# Patient Record
Sex: FEMALE | Race: WHITE | Employment: FULL TIME | URBAN - METROPOLITAN AREA
[De-identification: names, ages, dates, MRNs, and addresses within clinical notes are randomized per-mention and may not be internally consistent; named-entity substitution may affect disease eponyms.]

---

## 2017-01-31 ENCOUNTER — TELEPHONE (OUTPATIENT)
Dept: CARDIOLOGY CLINIC | Age: 72
End: 2017-01-31

## 2017-02-22 ENCOUNTER — PROCEDURE VISIT (OUTPATIENT)
Dept: CARDIOLOGY CLINIC | Age: 72
End: 2017-02-22

## 2017-02-22 ENCOUNTER — OFFICE VISIT (OUTPATIENT)
Dept: CARDIOLOGY CLINIC | Age: 72
End: 2017-02-22

## 2017-02-22 VITALS
SYSTOLIC BLOOD PRESSURE: 122 MMHG | RESPIRATION RATE: 16 BRPM | OXYGEN SATURATION: 96 % | BODY MASS INDEX: 30.73 KG/M2 | WEIGHT: 167 LBS | HEIGHT: 62 IN | DIASTOLIC BLOOD PRESSURE: 80 MMHG | HEART RATE: 76 BPM

## 2017-02-22 VITALS
DIASTOLIC BLOOD PRESSURE: 84 MMHG | BODY MASS INDEX: 30.88 KG/M2 | WEIGHT: 167.8 LBS | HEIGHT: 62 IN | SYSTOLIC BLOOD PRESSURE: 122 MMHG | HEART RATE: 97 BPM | OXYGEN SATURATION: 97 %

## 2017-02-22 DIAGNOSIS — R55 SYNCOPE, UNSPECIFIED SYNCOPE TYPE: Primary | ICD-10-CM

## 2017-02-22 DIAGNOSIS — I10 ESSENTIAL HYPERTENSION, BENIGN: ICD-10-CM

## 2017-02-22 DIAGNOSIS — Z45.09 ENCOUNTER FOR ELECTRONIC ANALYSIS OF REVEAL EVENT RECORDER: ICD-10-CM

## 2017-02-22 DIAGNOSIS — I25.10 CORONARY ARTERY DISEASE INVOLVING NATIVE CORONARY ARTERY OF NATIVE HEART WITHOUT ANGINA PECTORIS: Primary | ICD-10-CM

## 2017-02-22 DIAGNOSIS — R00.2 PALPITATIONS: ICD-10-CM

## 2017-02-22 DIAGNOSIS — I25.10 CORONARY ARTERY DISEASE INVOLVING NATIVE CORONARY ARTERY OF NATIVE HEART WITHOUT ANGINA PECTORIS: ICD-10-CM

## 2017-02-22 DIAGNOSIS — R00.0 TACHYCARDIA: ICD-10-CM

## 2017-02-22 PROCEDURE — 1123F ACP DISCUSS/DSCN MKR DOCD: CPT | Performed by: INTERNAL MEDICINE

## 2017-02-22 PROCEDURE — G8427 DOCREV CUR MEDS BY ELIG CLIN: HCPCS | Performed by: INTERNAL MEDICINE

## 2017-02-22 PROCEDURE — G8400 PT W/DXA NO RESULTS DOC: HCPCS | Performed by: INTERNAL MEDICINE

## 2017-02-22 PROCEDURE — 3014F SCREEN MAMMO DOC REV: CPT | Performed by: INTERNAL MEDICINE

## 2017-02-22 PROCEDURE — G8484 FLU IMMUNIZE NO ADMIN: HCPCS | Performed by: INTERNAL MEDICINE

## 2017-02-22 PROCEDURE — 99214 OFFICE O/P EST MOD 30 MIN: CPT | Performed by: INTERNAL MEDICINE

## 2017-02-22 PROCEDURE — 1090F PRES/ABSN URINE INCON ASSESS: CPT | Performed by: INTERNAL MEDICINE

## 2017-02-22 PROCEDURE — 1036F TOBACCO NON-USER: CPT | Performed by: INTERNAL MEDICINE

## 2017-02-22 PROCEDURE — G8598 ASA/ANTIPLAT THER USED: HCPCS | Performed by: INTERNAL MEDICINE

## 2017-02-22 PROCEDURE — 4040F PNEUMOC VAC/ADMIN/RCVD: CPT | Performed by: INTERNAL MEDICINE

## 2017-02-22 PROCEDURE — 99243 OFF/OP CNSLTJ NEW/EST LOW 30: CPT | Performed by: INTERNAL MEDICINE

## 2017-02-22 PROCEDURE — G8417 CALC BMI ABV UP PARAM F/U: HCPCS | Performed by: INTERNAL MEDICINE

## 2017-02-22 PROCEDURE — 3017F COLORECTAL CA SCREEN DOC REV: CPT | Performed by: INTERNAL MEDICINE

## 2017-02-22 PROCEDURE — 93291 INTERROG DEV EVAL SCRMS IP: CPT | Performed by: INTERNAL MEDICINE

## 2017-02-22 RX ORDER — PRASUGREL 10 MG/1
10 TABLET, FILM COATED ORAL DAILY
Qty: 90 TABLET | Refills: 3 | Status: ON HOLD | OUTPATIENT
Start: 2017-02-22 | End: 2018-05-25

## 2017-02-24 ENCOUNTER — PROCEDURE VISIT (OUTPATIENT)
Dept: CARDIOTHORACIC SURGERY | Age: 72
End: 2017-02-24

## 2017-02-24 DIAGNOSIS — I65.23 BILATERAL CAROTID ARTERY STENOSIS: Primary | ICD-10-CM

## 2017-02-24 PROCEDURE — 93880 EXTRACRANIAL BILAT STUDY: CPT | Performed by: SURGERY

## 2017-03-28 RX ORDER — BETAXOLOL 10 MG/1
10 TABLET, FILM COATED ORAL DAILY
Qty: 60 TABLET | Refills: 5 | Status: SHIPPED | OUTPATIENT
Start: 2017-03-28 | End: 2017-08-08 | Stop reason: SDUPTHER

## 2017-03-28 RX ORDER — FENOFIBRATE 160 MG/1
160 TABLET ORAL DAILY
Qty: 30 TABLET | Refills: 11 | Status: SHIPPED | OUTPATIENT
Start: 2017-03-28 | End: 2018-04-25 | Stop reason: SDUPTHER

## 2017-04-27 RX ORDER — CLOPIDOGREL BISULFATE 75 MG/1
75 TABLET ORAL DAILY
Qty: 30 TABLET | Refills: 5 | Status: ON HOLD | OUTPATIENT
Start: 2017-04-27 | End: 2018-05-25

## 2017-04-27 RX ORDER — CLOPIDOGREL BISULFATE 75 MG/1
75 TABLET ORAL DAILY
COMMUNITY
End: 2017-04-27 | Stop reason: SDUPTHER

## 2017-06-09 RX ORDER — ROSUVASTATIN CALCIUM 40 MG/1
40 TABLET, COATED ORAL EVERY EVENING
Qty: 30 TABLET | Refills: 11 | Status: SHIPPED | OUTPATIENT
Start: 2017-06-09 | End: 2018-07-01 | Stop reason: SDUPTHER

## 2017-06-20 ENCOUNTER — TELEPHONE (OUTPATIENT)
Dept: CARDIOLOGY CLINIC | Age: 72
End: 2017-06-20

## 2017-07-07 ENCOUNTER — TELEPHONE (OUTPATIENT)
Dept: CARDIOLOGY CLINIC | Age: 72
End: 2017-07-07

## 2017-08-08 ENCOUNTER — HOSPITAL ENCOUNTER (OUTPATIENT)
Dept: OTHER | Age: 72
Discharge: HOME | End: 2017-08-08
Attending: INTERNAL MEDICINE | Admitting: INTERNAL MEDICINE

## 2017-08-08 ENCOUNTER — OFFICE VISIT (OUTPATIENT)
Dept: CARDIOLOGY CLINIC | Age: 72
End: 2017-08-08

## 2017-08-08 VITALS
WEIGHT: 168 LBS | SYSTOLIC BLOOD PRESSURE: 120 MMHG | DIASTOLIC BLOOD PRESSURE: 80 MMHG | OXYGEN SATURATION: 93 % | HEART RATE: 84 BPM | BODY MASS INDEX: 30.91 KG/M2 | HEIGHT: 62 IN

## 2017-08-08 DIAGNOSIS — I25.10 CORONARY ARTERY DISEASE INVOLVING NATIVE CORONARY ARTERY OF NATIVE HEART WITHOUT ANGINA PECTORIS: Primary | ICD-10-CM

## 2017-08-08 DIAGNOSIS — E78.5 HYPERLIPIDEMIA, UNSPECIFIED HYPERLIPIDEMIA TYPE: Chronic | ICD-10-CM

## 2017-08-08 DIAGNOSIS — R06.02 SOB (SHORTNESS OF BREATH): ICD-10-CM

## 2017-08-08 DIAGNOSIS — R00.0 INAPPROPRIATE SINUS TACHYCARDIA: ICD-10-CM

## 2017-08-08 DIAGNOSIS — I10 ESSENTIAL HYPERTENSION, BENIGN: ICD-10-CM

## 2017-08-08 DIAGNOSIS — Z95.5 STATUS POST INSERTION OF DRUG-ELUTING STENT INTO RIGHT CORONARY ARTERY FOR CORONARY ARTERY DISEASE: ICD-10-CM

## 2017-08-08 LAB
HCT VFR BLD CALC: 37.5 % (ref 36–48)
HEMOGLOBIN: 12.3 G/DL (ref 12–16)
MCH RBC QN AUTO: 31.9 PG (ref 26–34)
MCHC RBC AUTO-ENTMCNC: 32.8 G/DL (ref 31–36)
MCV RBC AUTO: 97.1 FL (ref 80–100)
PDW BLD-RTO: 12.4 % (ref 12.4–15.4)
PLATELET # BLD: 299 K/UL (ref 135–450)
PMV BLD AUTO: 9.8 FL (ref 5–10.5)
RBC # BLD: 3.86 M/UL (ref 4–5.2)
WBC # BLD: 9.4 K/UL (ref 4–11)

## 2017-08-08 PROCEDURE — 4040F PNEUMOC VAC/ADMIN/RCVD: CPT | Performed by: INTERNAL MEDICINE

## 2017-08-08 PROCEDURE — 1090F PRES/ABSN URINE INCON ASSESS: CPT | Performed by: INTERNAL MEDICINE

## 2017-08-08 PROCEDURE — G8400 PT W/DXA NO RESULTS DOC: HCPCS | Performed by: INTERNAL MEDICINE

## 2017-08-08 PROCEDURE — 3014F SCREEN MAMMO DOC REV: CPT | Performed by: INTERNAL MEDICINE

## 2017-08-08 PROCEDURE — G8417 CALC BMI ABV UP PARAM F/U: HCPCS | Performed by: INTERNAL MEDICINE

## 2017-08-08 PROCEDURE — 1036F TOBACCO NON-USER: CPT | Performed by: INTERNAL MEDICINE

## 2017-08-08 PROCEDURE — 1123F ACP DISCUSS/DSCN MKR DOCD: CPT | Performed by: INTERNAL MEDICINE

## 2017-08-08 PROCEDURE — 3017F COLORECTAL CA SCREEN DOC REV: CPT | Performed by: INTERNAL MEDICINE

## 2017-08-08 PROCEDURE — G8598 ASA/ANTIPLAT THER USED: HCPCS | Performed by: INTERNAL MEDICINE

## 2017-08-08 PROCEDURE — 99214 OFFICE O/P EST MOD 30 MIN: CPT | Performed by: INTERNAL MEDICINE

## 2017-08-08 PROCEDURE — G8427 DOCREV CUR MEDS BY ELIG CLIN: HCPCS | Performed by: INTERNAL MEDICINE

## 2017-08-08 RX ORDER — BETAXOLOL 10 MG/1
10 TABLET, FILM COATED ORAL 2 TIMES DAILY
Qty: 180 TABLET | Refills: 3 | Status: SHIPPED | OUTPATIENT
Start: 2017-08-08 | End: 2018-09-19

## 2017-08-09 ENCOUNTER — TELEPHONE (OUTPATIENT)
Dept: CARDIOLOGY CLINIC | Age: 72
End: 2017-08-09

## 2017-08-25 ENCOUNTER — PROCEDURE VISIT (OUTPATIENT)
Dept: VASCULAR SURGERY | Age: 72
End: 2017-08-25

## 2017-08-25 DIAGNOSIS — I65.23 BILATERAL CAROTID ARTERY STENOSIS: Primary | ICD-10-CM

## 2017-08-25 PROCEDURE — 93880 EXTRACRANIAL BILAT STUDY: CPT | Performed by: SURGERY

## 2018-02-13 NOTE — PROGRESS NOTES
thickness.   Global ejection fraction is mildly decreased and estimated from 45 % to   50 %. There is akinesis of the basal inferior wall.   Diastolic filling parameters suggests diastolic dysfunction .   Trivial mitral regurgitation is present.     CATH: 8/27/15  [facility] SUMMARY  ~LM normal  ~LAD 40% mid  ~Cx 20% OM1  ~% prox instent, 50% mid, 50% PDA,   (anomalous anterior and vertical takeoff, AL1 engaged adequately, JR4 did not engage ostium)  ~LVG 50%, inferior hk     Impression  ~Angiography w/ acute RCA occlusion  ~LVEDP 20  ~LVG with borderline EF with inferior RWMA       Cardiac Cath 12/3/13:  ANGIOGRAPHIC FINDINGS:   1. Mild-to-moderate nonobstructive coronary artery disease with patent right   coronary artery stents from prior procedures. 2. Normal left ventricular function   3. Normal hemodynamics. ANGIOGRAPHIC FINDINGS:   1. Left main is normal and bifurcates into the LAD and the circumflex. The   circumflex coronary artery is a small codominant vessel and has no critical   disease. LAD is a moderate-sized vessel and has a moderate-sized large   diagonal branch and a smaller LAD. The diagonal branch has a mild disease   within it. The LAD is free of disease. The right coronary artery is a large   dominant vessel circulation and has an anterior takeoff with previously   placed stents within in the ostium and in the proximal third. There is   mild-to-moderate in-stent restenosis of these stented segments and distal   right coronary artery is a small and codominant and no critical disease. Left ventriculogram was performed and shows ejection fraction of 60%. Left   ventricular end diastolic pressure is 9, aortic pressure is 132/64, left   ventricular pressure is 134/9. There is no gradient on pullback. Wall   motion is normal.     Assessment:   1.  Coronary artery disease involving native coronary artery of native heart with other form of angina pectoris ([address]  ECHO Complete 2D W Doppler W Color

## 2018-02-14 ENCOUNTER — OFFICE VISIT (OUTPATIENT)
Dept: CARDIOLOGY CLINIC | Age: 73
End: 2018-02-14

## 2018-02-14 VITALS
DIASTOLIC BLOOD PRESSURE: 76 MMHG | BODY MASS INDEX: 30.69 KG/M2 | SYSTOLIC BLOOD PRESSURE: 122 MMHG | HEIGHT: 62 IN | HEART RATE: 100 BPM | WEIGHT: 166.8 LBS

## 2018-02-14 DIAGNOSIS — Z95.5 STATUS POST INSERTION OF DRUG-ELUTING STENT INTO RIGHT CORONARY ARTERY FOR CORONARY ARTERY DISEASE: ICD-10-CM

## 2018-02-14 DIAGNOSIS — I65.21 STENOSIS OF RIGHT CAROTID ARTERY: ICD-10-CM

## 2018-02-14 DIAGNOSIS — I25.118 CORONARY ARTERY DISEASE INVOLVING NATIVE CORONARY ARTERY OF NATIVE HEART WITH OTHER FORM OF ANGINA PECTORIS (HCC): Primary | ICD-10-CM

## 2018-02-14 DIAGNOSIS — E78.5 HYPERLIPIDEMIA, UNSPECIFIED HYPERLIPIDEMIA TYPE: ICD-10-CM

## 2018-02-14 DIAGNOSIS — I35.1 NONRHEUMATIC AORTIC VALVE INSUFFICIENCY: ICD-10-CM

## 2018-02-14 DIAGNOSIS — R60.0 BILATERAL LEG EDEMA: ICD-10-CM

## 2018-02-14 DIAGNOSIS — I10 ESSENTIAL HYPERTENSION: ICD-10-CM

## 2018-02-14 DIAGNOSIS — Z45.09 ENCOUNTER FOR ELECTRONIC ANALYSIS OF REVEAL EVENT RECORDER: ICD-10-CM

## 2018-02-14 PROCEDURE — 1123F ACP DISCUSS/DSCN MKR DOCD: CPT | Performed by: INTERNAL MEDICINE

## 2018-02-14 PROCEDURE — G8427 DOCREV CUR MEDS BY ELIG CLIN: HCPCS | Performed by: INTERNAL MEDICINE

## 2018-02-14 PROCEDURE — 1036F TOBACCO NON-USER: CPT | Performed by: INTERNAL MEDICINE

## 2018-02-14 PROCEDURE — G8598 ASA/ANTIPLAT THER USED: HCPCS | Performed by: INTERNAL MEDICINE

## 2018-02-14 PROCEDURE — G8484 FLU IMMUNIZE NO ADMIN: HCPCS | Performed by: INTERNAL MEDICINE

## 2018-02-14 PROCEDURE — G8417 CALC BMI ABV UP PARAM F/U: HCPCS | Performed by: INTERNAL MEDICINE

## 2018-02-14 PROCEDURE — 1090F PRES/ABSN URINE INCON ASSESS: CPT | Performed by: INTERNAL MEDICINE

## 2018-02-14 PROCEDURE — 4040F PNEUMOC VAC/ADMIN/RCVD: CPT | Performed by: INTERNAL MEDICINE

## 2018-02-14 PROCEDURE — 3014F SCREEN MAMMO DOC REV: CPT | Performed by: INTERNAL MEDICINE

## 2018-02-14 PROCEDURE — 3017F COLORECTAL CA SCREEN DOC REV: CPT | Performed by: INTERNAL MEDICINE

## 2018-02-14 PROCEDURE — G8400 PT W/DXA NO RESULTS DOC: HCPCS | Performed by: INTERNAL MEDICINE

## 2018-02-14 PROCEDURE — 99214 OFFICE O/P EST MOD 30 MIN: CPT | Performed by: INTERNAL MEDICINE

## 2018-02-14 RX ORDER — PANTOPRAZOLE SODIUM 40 MG/1
40 TABLET, DELAYED RELEASE ORAL 2 TIMES DAILY
Qty: 60 TABLET | Refills: 0 | Status: SHIPPED | OUTPATIENT
Start: 2018-02-14 | End: 2019-09-25 | Stop reason: SDUPTHER

## 2018-02-14 RX ORDER — LISINOPRIL 10 MG/1
10 TABLET ORAL DAILY
Qty: 90 TABLET | Refills: 3 | Status: SHIPPED | OUTPATIENT
Start: 2018-02-14 | End: 2019-09-25 | Stop reason: ALTCHOICE

## 2018-02-14 NOTE — LETTER
[address] [place] [address] Cardiology - [place] [address] [address]  Phone: [phone]  Fax: [phone]    Trent Alejo MD        February 15, 2018     SU[facility]ANNE Fern[facility]dez [facility] #[id number]  [address] [address]    Patient: Danyel Giles  MR Number: [id number]  YOB: 1945  Date of Visit: 2/14/2018    Dear Dr. Vanessa Elias:    Below are the relevant portions of my assessment and plan of care. [address]   Cardiac Followup    Referring Provider:  Vanessa Elias     Chief Complaint   Patient presents with    6 Month Follow-Up    Hypertension    Coronary Artery Disease     chest pain    Swelling     in ankles when sitting or driving a long time        History of Present Illness: This 71 y.o. female who is here for a follow up visit. She a history of CAD s/p PCI, carotid disease and hyperlipidemia. She had a  inferior STEMI 8/27/15 s/p emergent cath w/ stent to RCA, post procedure course complicated by pericarditis and significant gi bleeding. Her stress test in 5/2016 showed normal perfusion. S/p left CEA in 7/2014. She had multiple episodes of syncope and underwent ILR on 10/15/2014. Today she reports intermittent chest discomfort, mid-sternal with radiation to right chest, associated with diaphoresis, random occurrence and unrelated to exertion, is relieved with \"time. \"  She breathes through her mouth more than she should and has DELGADO with minimal exertion. She has lower extremity edema noted when legs in dependent position for longer periods of time such as car rides. Resting HR is in 80's which she thinks is too high and can increase to low 100's. She denies heart racing and would like ILR removed as the battery is depleted. She has noted BP to be high on occasion (150/103). She tries to avoid salt and has lost 12 lbs, but wants to lose 24 additional pounds.   She was hospitalized at [facility] in Per[place]ard[place]: no significant effusion seen  Conclusions:  indication is dizziness Normal LV size, hyperdynamic LV function with EF greater  than 75 percent, abnormal relaxation Mild-to-moderate aortic regurgitation, mild  tricuspid regurgitation  Electronically signed by MD Carrie Anaya on 07/09/2017 at 04:17 PM (No Signature  Object)    Carotid US 2/2017:  Right:    50-80% stenosis of the internal carotid artery based on velocity criteria    and Bmode image.    <50% stenosis of the common carotid artery.    >>50% stenosis of the external carotid artery.    Unremarkable subclavian and vertebral arteries.        Left:    <50% stenosis of the internal carotid artery based on velocity criteria and    Bmode image.    <50% stenosis of the common carotid artery.    <50% stenosis of the external carotid artery.    Unremarkable subclavian and vertebral arteries. Carotid US 8/2016   Right:    50-80% stenosis of the internal carotid artery based on velocity criteria    and Bmode image.    <50% stenosis of the common carotid artery.    >50% stenosis of the external carotid artery.    Unremarkable subclavian and vertebral arteries.        Left:    1-15% stenosis of the internal carotid artery based on velocity criteria and    Bmode image.    <50% stenosis of the common carotid artery.    <50% stenosis of the external carotid artery.    Unremarkable subclavian and vertebral arteries        Recommendations    In comparison to the previous exam 01/19/2016 there are no significant    changes.    Follow-up to be done on or after 02/19/2017.           Stress Myoview 5/2016  Summary   No EKG evidence for ischemia with exercise   Preserved LV systolic function   Myocardial perfusion imaging with no evidence for ischemia with exercise   Stress Protocols    Resting ECG    NSR     Echo: 1/19/16  The left ventricle is normal in size. Normal left ventricular systolic function, with estimated ejection fraction of 55%-60%. No regional wall motion abnormalities noted. Mild concentric left ventricular hypertrophy is present. Diastolic filling parameters suggests grade I diastolic dysfunction. Aortic valve is not well visualized but appears mildly sclerotic and opens adequately. Mild mitral and aortic regurgitation are present. Trivial tricuspid regurgitation. Systolic pulmonary artery pressure (SPAP) is normal and estimated at 27mmHg (RA pressure 3 mm Hg). ECHO: 8/27/15   Left ventricle size is normal.   Normal left ventricular wall thickness.   Global ejection fraction is mildly decreased and estimated from 45 % to   50 %. There is akinesis of the basal inferior wall.   Diastolic filling parameters suggests diastolic dysfunction .   Trivial mitral regurgitation is present.     CATH: 8/27/15  [facility] SUMMARY  ~LM normal  ~LAD 40% mid  ~Cx 20% OM1  ~% prox instent, 50% mid, 50% PDA,   (anomalous anterior and vertical takeoff, AL1 engaged adequately, JR4 did not engage ostium)  ~LVG 50%, inferior hk     Impression  ~Angiography w/ acute RCA occlusion  ~LVEDP 20  ~LVG with borderline EF with inferior RWMA       Cardiac Cath 12/3/13:  ANGIOGRAPHIC FINDINGS:   1. Mild-to-moderate nonobstructive coronary artery disease with patent right   coronary artery stents from prior procedures. 2. Normal left ventricular function   3. Normal hemodynamics. ANGIOGRAPHIC FINDINGS:   1. Left main is normal and bifurcates into the LAD and the circumflex. The   circumflex coronary artery is a small codominant vessel and has no critical   disease. LAD is a moderate-sized vessel and has a moderate-sized large   diagonal branch and a smaller LAD. The diagonal branch has a mild disease   within it. The LAD is free of disease. The right coronary artery is a large   dominant vessel circulation and has an anterior takeoff with previously   placed stents within in the ostium and in the proximal third.  There is 12.8 oz (75.7 kg), not currently breastfeeding. stable at today's visit, but has been elevated intermittently at home  Will add Lisinopril 10 mg daily    Hyperlipidemia:  10/2017 TC- 185, TG- 349, HDL- 42, LDL- 73. (Care Everywhere)  Treated with Crestor 40 mg and Fenofibrate 160 mg daily    Palpitations:  Has ILR implanted for monitoring of palpitations. No current heart racing  Patient states ILR battery is depleted and would like to have device removed. Was implanted by Dr. Domo Mcdowell in [facility]  Will refer to EP in F    Also has resting HR of 80's which she feels is too high. On maximum dose of Betaxolol    Edema:  Has occasional edema (dependent) which is likely related to venous insufficiency  Discussed wearing compression stocking during planned tavo car rides or other activities when legs will be in dependent position. Aortic insufficiency  7/2017 Echo showed mild to moderate AI  Will repeat echo soon    Plan:  1. Refer to EP to discuss removal of ILR  2. Myoview GXT (can convert to [employer] if needed) to evaluate atypical chest pain and DELGADO  3. Repeat echo to reassess AI  4. Start Lisinopril 10 mg daily to improve BP control  5. Check CMP, lipids, and CBC in 2-3 weeks. 6.  Compression hose 15-20 mmHg knee high on long trips  7. RTC one month    Thank you for allowing me to participate in the care of this individual.    Scribe's attestation: This note was scribed in the presence of Jacqueline Schaefer M.D. by Aneesh Moseley RN    Physician Attestation: The scribe's documentation has been prepared under my direction and personally reviewed by me in its entirety. I confirm that the note above accurately reflects all work, treatment, procedures, and medical decision making performed by me. Domnick Sensing Billey Duverney, M.D., [facility] [place],        If you have questions, please do not hesitate to call me. I look forward to following Gustav Litten along with you.     Sincerely,        Jacqueline Schaefer MD

## 2018-02-15 NOTE — COMMUNICATION BODY
(3/2009); Appendectomy; Hand surgery; Intracapsular cataract extraction (Bilateral, [id number]); fracture surgery (01/2013); eye surgery (12/19/2012. 12/30/2012); other surgical history (1/17/14); Carotid endarterectomy (Left, 7-8-14); ablation of dysrhythmic focus (7/6/2015); Insertable Cardiac Monitor (10/15/2014); skin biopsy; Coronary angioplasty with stent (1/24/2012); Coronary angioplasty with stent (8/27/2015); Colonoscopy (2007); and Upper gastrointestinal endoscopy (06/20/2016). Social History:   reports that she quit smoking about 54 years ago. She has a 0.50 pack-year smoking history. She has never used smokeless tobacco. She reports that she drinks alcohol. She reports that she does not use drugs. Family History:  family history is not on file. She was adopted. Home Medications:  Prior to Admission medications    Medication Sig Start Date End Date Taking? Authorizing Provider   lisinopril (PRINIVIL;ZESTRIL) 10 MG tablet Take 1 tablet by mouth daily 2/14/18  Yes Diane Rooney MD   pantoprazole (PROTONIX) 40 MG tablet Take 1 tablet by mouth 2 times daily prescribing med until patient can get into GI doctor 2/14/18  Yes Diane Rooney MD   rosuvastatin (CRESTOR) 40 MG tablet Take 1 tablet by mouth every evening 6/9/17  Yes Diane Rooney MD   fenofibrate 160 MG tablet Take 1 tablet by mouth daily 3/28/17  Yes Diane Rooney MD   nitroGLYCERIN (NITROSTAT) 0.4 MG SL tablet DISSOLVE 1 TAB UNDER TONGUE FOR CHEST PAIN - IF PAIN REMAINS AFTER 5 MIN, CALL 911 AND REPEAT DOSE.  MAX 3 TABS IN 15 MINUTES 9/22/16  Yes Gayle Sousa NP   HYDROcodone-acetaminophen (NORCO) 5-325 MG per tablet Take 1 tablet by mouth every 6 hours as needed for Pain   Yes Historical Provider, MD   acyclovir (ZOVIRAX) 200 MG capsule Take 200 mg by mouth daily   Yes Historical Provider, MD   venlafaxine (EFFEXOR) 75 MG tablet Take 75 mg by mouth daily   Yes Historical Provider, MD   Calcium Carb-Cholecalciferol (CALCIUM Noted  Neurological/Psychiatric:  · Alert and oriented in all spheres  · Moves all extremities well  · Exhibits normal gait balance and coordination  · No abnormalities of mood, affect, memory, mentation, or behavior are noted      Carotid US 8/2017  Right:    50-80% stenosis of the internal carotid artery based on velocity criteria    and B-Mode image.    <50% stenosis of the common carotid artery.    >50% stenosis of the external carotid artery.    Unremarkable subclavian and vertebral arteries.        Left:    <50% stenosis of the internal carotid artery based on velocity criteria and    B-Mode image.    <50% stenosis of the common carotid artery.    <50% stenosis of the external carotid artery.    Unremarkable subclavian and vertebral arteries.         Echo 7/7/17: ([facility])    Left Atrium: Moderately enlarged     Left Ventricle: Normal LV size and  hyperdynamic LV function, EF is greater than 75 percent, mild LVH, abnormal  relaxation     Right Atrium: normal size     Right Ventricle: normal size and  function     Aortic Valve: appears normal, normal mobility, mild to moderate  regurgitation     Aorta: aortic root is normal in size     IVC/PA: IVC size is  normal     Mitral Valve: moderate annular calcification, mildly thickened  leaflets, trivial regurgitation     Tricuspid Valve: appears normal, mild  regurgitation, RVSP is 32 mmHg     Pulmonic Valve: appears normal, trivial  regurgitation     Pericardium: no significant effusion seen  Conclusions:  indication is dizziness Normal LV size, hyperdynamic LV function with EF greater  than 75 percent, abnormal relaxation Mild-to-moderate aortic regurgitation, mild  tricuspid regurgitation  Electronically signed by MD Yvon Sweet on 07/09/2017 at 04:17 PM (No Signature  Object)    Carotid US 2/2017:  Right:    50-80% stenosis of the internal carotid artery based on velocity criteria    and Bmode image.    <50% stenosis of the common carotid artery.    >>50% stenosis of

## 2018-02-22 ENCOUNTER — HOSPITAL ENCOUNTER (OUTPATIENT)
Dept: NON INVASIVE DIAGNOSTICS | Age: 73
Discharge: HOME | End: 2018-02-22
Attending: INTERNAL MEDICINE | Admitting: INTERNAL MEDICINE

## 2018-02-22 DIAGNOSIS — I25.118 CORONARY ARTERY DISEASE INVOLVING NATIVE CORONARY ARTERY OF NATIVE HEART WITH OTHER FORM OF ANGINA PECTORIS (HCC): ICD-10-CM

## 2018-02-22 DIAGNOSIS — I25.118 ATHEROSCLEROTIC HEART DISEASE OF NATIVE CORONARY ARTERY WITH OTHER FORMS OF ANGINA PECTORIS (HCC): ICD-10-CM

## 2018-02-22 DIAGNOSIS — I35.1 NONRHEUMATIC AORTIC VALVE INSUFFICIENCY: ICD-10-CM

## 2018-02-22 DIAGNOSIS — I10 ESSENTIAL HYPERTENSION: ICD-10-CM

## 2018-02-22 LAB
LEFT VENTRICULAR EJECTION FRACTION HIGH VALUE: 65 %
LEFT VENTRICULAR EJECTION FRACTION MODE: NORMAL
LV EF: 65 %
LV EF: 73 %
LVEF MODALITY: NORMAL
LVEF MODALITY: NORMAL

## 2018-02-22 NOTE — PROGRESS NOTES
Patient instructed on Shabbir Protocol Stress Test Procedure including possible side effects and adverse reactions. Verbalizes knowledge and understanding and denies having any questions.  Drake Anderson RN

## 2018-02-22 NOTE — PROGRESS NOTES
Pt unable to walk ariella stress test protocol due to SOB and fatigue. Pt switched to Ashley Crutch stress test.       Patient instructed on Lexiscan Protocol Stress Test Procedure including possible side effects and adverse reactions. Verbalizes knowledge and understanding and denies having any questions.  Ken Siemens, RN

## 2018-02-23 ENCOUNTER — TELEPHONE (OUTPATIENT)
Dept: CARDIOLOGY CLINIC | Age: 73
End: 2018-02-23

## 2018-02-23 NOTE — TELEPHONE ENCOUNTER
Asher Ramirez MD  [facility] Staff    Echo looks good.  Strength of the heart muscle is normal. Camilla Rout are without significant abnormality.      Called patient and relayed message, per MARYANNE

## 2018-03-13 NOTE — PROGRESS NOTES
past surgical history that includes Hysterectomy; Cholecystectomy; Coronary angioplasty with stent (3/2009); Appendectomy; Hand surgery; Intracapsular cataract extraction (Bilateral, [id number]); fracture surgery (01/2013); eye surgery (12/19/2012. 12/30/2012); other surgical history (1/17/14); Carotid endarterectomy (Left, 7-8-14); ablation of dysrhythmic focus (7/6/2015); Insertable Cardiac Monitor (10/15/2014); skin biopsy; Coronary angioplasty with stent (1/24/2012); Coronary angioplasty with stent (8/27/2015); Colonoscopy (2007); and Upper gastrointestinal endoscopy (06/20/2016). Social History:   reports that she quit smoking about 54 years ago. She has a 0.50 pack-year smoking history. She has never used smokeless tobacco. She reports that she drinks alcohol. She reports that she does not use drugs. Family History:  family history is not on file. She was adopted. Home Medications:  Prior to Admission medications    Medication Sig Start Date End Date Taking? Authorizing Provider   lisinopril (PRINIVIL;ZESTRIL) 10 MG tablet Take 1 tablet by mouth daily 2/14/18  Yes Trent Alejo MD   pantoprazole (PROTONIX) 40 MG tablet Take 1 tablet by mouth 2 times daily prescribing med until patient can get into GI doctor 2/14/18  Yes Trent Alejo MD   betaxolol Gregoria Door) 10 MG tablet Take 1 tablet by mouth 2 times daily 8/8/17  Yes Trent Alejo MD   rosuvastatin (CRESTOR) 40 MG tablet Take 1 tablet by mouth every evening 6/9/17  Yes Trent Alejo MD   clopidogrel (PLAVIX) 75 MG tablet Take 1 tablet by mouth daily 4/27/17  Yes Trent Alejo MD   fenofibrate 160 MG tablet Take 1 tablet by mouth daily 3/28/17  Yes Trent Alejo MD   prasugrel (EFFIENT) 10 MG TABS Take 1 tablet by mouth daily 2/22/17  Yes Trent Alejo MD   nitroGLYCERIN (NITROSTAT) 0.4 MG SL tablet DISSOLVE 1 TAB UNDER TONGUE FOR CHEST PAIN - IF PAIN REMAINS AFTER 5 MIN, CALL 911 AND REPEAT DOSE.  MAX 3 TABS IN 15 MINUTES 9/22/16  Yes Edita Shepard NP   HYDROcodone-acetaminophen (NORCO) 5-325 MG per tablet Take 1 tablet by mouth every 6 hours as needed for Pain   Yes Historical Provider, MD   acyclovir (ZOVIRAX) 200 MG capsule Take 200 mg by mouth daily   Yes Historical Provider, MD   venlafaxine (EFFEXOR) 75 MG tablet Take 75 mg by mouth daily   Yes Historical Provider, MD   polycarbophil (FIBER-LAX) 625 MG tablet Take 625 mg by mouth daily   Yes Historical Provider, MD   Calcium Carb-Cholecalciferol (CALCIUM + D3) 600-200 MG-UNIT TABS Take 1 tablet by mouth daily   Yes Historical Provider, MD   Biotin 1000 MCG TABS Take by mouth Daily   Yes Historical Provider, MD   vitamin B-12 (CYANOCOBALAMIN) 100 MCG tablet Take 50 mcg by mouth daily. Yes Historical Provider, MD   docusate sodium (COLACE) 100 MG capsule   Take 200 mg by mouth nightly    Yes Historical Provider, MD   aspirin 81 MG EC tablet Take 81 mg by mouth daily. Yes Historical Provider, MD   Multiple Vitamins-Minerals (THERAPEUTIC MULTIVITAMIN-MINERALS) tablet Take 1 tablet by mouth daily. Yes Historical Provider, MD        Allergies:  Codeine; Nsaids; Prochlorperazine; Prochlorperazine edisylate; Sulfa antibiotics; and Coreg [carvedilol]     Review of Systems:   A 10 point review of systems is negative except as noted in the history of present illness. Physical Examination:    Vitals:    03/14/18 1236   BP: 120/79   Pulse: 87   SpO2: 91%   Stable. Constitutional and General Appearance: NAD   Respiratory:  · Normal excursion and expansion without use of accessory muscles  · Resp Auscultation: Normal breath sounds without dullness  Cardiovascular:  · The apical impulses not displaced  · JVD is normal  · The carotid upstroke is normal in amplitude and contour without delay or bruit  · Normal S1S2, No S3, No Murmur. Regular rate and rhythm  · Peripheral pulses are symmetrical and full  · There is no clubbing, cyanosis of the extremities.   · No edema  · Pedal Pulses: 2+ and equal   Abdomen:  · No masses or tenderness  · Liver/Spleen: No Abnormalities Noted  Neurological/Psychiatric:  · Alert and oriented in all spheres  · Moves all extremities well  · Exhibits normal gait balance and coordination  · No abnormalities of mood, affect, memory, mentation, or behavior are noted      Echo 2/22/18:  Normal left ventricle size, wall thickness, and systolic function with an   EF of 65%. Aortic valve appears sclerotic but opens adequately. Mild aortic regurgitation. Mild mitral annular calcification is present. Trace mitral regurgitation noted. Trivial tricuspid regurgitation with a RVSP estimation of 29 mmHg. Lexiscan myoview GXT 2/22/18:  No EKG evidence for ischemia with lexiscan   Normal LV size and systolic function. There is normal isotope uptake at stress and rest. There is no evidence of  myocardial ischemia or scar.       Carotid US 8/2017:  Right:    50-80% stenosis of the internal carotid artery based on velocity criteria    and B-Mode image.    <50% stenosis of the common carotid artery.    >50% stenosis of the external carotid artery.    Unremarkable subclavian and vertebral arteries.        Left:    <50% stenosis of the internal carotid artery based on velocity criteria and    B-Mode image.    <50% stenosis of the common carotid artery.    <50% stenosis of the external carotid artery.    Unremarkable subclavian and vertebral arteries.         [facility]/17: [facility] [facility] [address]    Left Atrium: Moderately enlarged     Left Ventricle: Normal LV size and  hyperdynamic LV function, EF is greater than 75 percent, mild LVH, abnormal  relaxation     Right Atrium: normal size     Right Ventricle: normal size and  function     Aortic Valve: appears normal, normal mobility, mild to moderate  regurgitation     Aorta: aortic root is normal in size     IVC/PA: IVC size is  normal     Mitral Valve: moderate annular calcification, mildly thickened  leaflets, trivial 1/19/16  The left ventricle is normal in size. Normal left ventricular systolic function, with estimated ejection fraction of 55%-60%. No regional wall motion abnormalities noted. Mild concentric left ventricular hypertrophy is present. Diastolic filling parameters suggests grade I diastolic dysfunction. Aortic valve is not well visualized but appears mildly sclerotic and opens adequately. Mild mitral and aortic regurgitation are present. Trivial tricuspid regurgitation. Systolic pulmonary artery pressure (SPAP) is normal and estimated at 27mmHg (RA pressure 3 mm Hg). ECHO: 8/27/15   Left ventricle size is normal.   Normal left ventricular wall thickness.   Global ejection fraction is mildly decreased and estimated from 45 % to   50 %. There is akinesis of the basal inferior wall.   Diastolic filling parameters suggests diastolic dysfunction .   Trivial mitral regurgitation is present.     CATH: 8/27/15  C SUMMARY  ~LM normal  ~LAD 40% mid  ~Cx 20% OM1  ~% prox instent, 50% mid, 50% PDA,   (anomalous anterior and vertical takeoff, AL1 engaged adequately, JR4 did not engage ostium)  ~LVG 50%, inferior hk     Impression  ~Angiography w/ acute RCA occlusion  ~LVEDP 20  ~LVG with borderline EF with inferior RWMA       Cardiac Cath 12/3/13:  ANGIOGRAPHIC FINDINGS:   1. Mild-to-moderate nonobstructive coronary artery disease with patent right   coronary artery stents from prior procedures. 2. Normal left ventricular function   3. Normal hemodynamics. ANGIOGRAPHIC FINDINGS:   1. Left main is normal and bifurcates into the LAD and the circumflex. The   circumflex coronary artery is a small codominant vessel and has no critical   disease. LAD is a moderate-sized vessel and has a moderate-sized large   diagonal branch and a smaller LAD. The diagonal branch has a mild disease   within it. The LAD is free of disease.  The right coronary artery is a large   dominant vessel circulation and has an anterior Hyperlipidemia:  - 10/2017 TC- 185, TG- 349, HDL- 42, LDL- 73. (Care Everywhere)  - Treated with Crestor 40 mg and Fenofibrate 160 mg daily  - 3/3/18- TC- 163, TG- 172, HDL- 49, LDL 80. AST 41, ALT 41.    - stable    6. Palpitations:  - Has ILR implanted for monitoring of palpitations.   - No current heart racing  - Patient states ILR battery is depleted and would like to have device removed. Was implanted by Dr. Richard Obando in [place]  - has appt with EP 4/4/18 to discuss removal of ILR  - palpitations are not frequent although her resting HR is 80-90's. HR remains under 100 most of the time. She is on maximum dose of Betaxolol    7. Edema:  - Has occasional edema (dependent) which is likely related to venous insufficiency  - Discussed wearing compression stocking during planned tavo car rides or other activities when legs will be in dependent position. - recommended 15-20 mmHg compression stockings    8. Aortic insufficiency  - 7/2017 Echo showed mild to moderate AI  - 2/22/18 Echo showed mild AI and normal EF. Plan:  1. No change in medication  2. Recommend 15-20 mmHg knee high compression hose. 3.  Follow up in six months     Thank you for allowing me to participate in the care of this individual.    Scribe's attestation: This note was scribed in the presence of Evy Palmer M.D. by Fawad Lerner RN    Physician Attestation: The scribe's documentation has been prepared under my direction and personally reviewed by me in its entirety. I confirm that the note above accurately reflects all work, treatment, procedures, and medical decision making performed by me. Glenn Franco M.D., [facility] [place],

## 2018-03-14 ENCOUNTER — OFFICE VISIT (OUTPATIENT)
Dept: CARDIOLOGY CLINIC | Age: 73
End: 2018-03-14

## 2018-03-14 VITALS
HEART RATE: 87 BPM | OXYGEN SATURATION: 91 % | DIASTOLIC BLOOD PRESSURE: 79 MMHG | HEIGHT: 63 IN | SYSTOLIC BLOOD PRESSURE: 120 MMHG | WEIGHT: 169 LBS | BODY MASS INDEX: 29.95 KG/M2

## 2018-03-14 DIAGNOSIS — I65.29 CAROTID ATHEROSCLEROSIS, UNSPECIFIED LATERALITY: ICD-10-CM

## 2018-03-14 DIAGNOSIS — E78.2 MIXED HYPERLIPIDEMIA: ICD-10-CM

## 2018-03-14 DIAGNOSIS — I10 ESSENTIAL HYPERTENSION, BENIGN: ICD-10-CM

## 2018-03-14 DIAGNOSIS — I35.1 NONRHEUMATIC AORTIC VALVE INSUFFICIENCY: ICD-10-CM

## 2018-03-14 DIAGNOSIS — I25.118 CORONARY ARTERY DISEASE INVOLVING NATIVE CORONARY ARTERY OF NATIVE HEART WITH OTHER FORM OF ANGINA PECTORIS (HCC): Primary | ICD-10-CM

## 2018-03-14 DIAGNOSIS — Z98.61 CAD S/P PERCUTANEOUS CORONARY ANGIOPLASTY: ICD-10-CM

## 2018-03-14 DIAGNOSIS — Z95.5 STATUS POST INSERTION OF DRUG-ELUTING STENT INTO RIGHT CORONARY ARTERY FOR CORONARY ARTERY DISEASE: ICD-10-CM

## 2018-03-14 DIAGNOSIS — R60.0 BILATERAL LEG EDEMA: ICD-10-CM

## 2018-03-14 DIAGNOSIS — I25.10 CAD S/P PERCUTANEOUS CORONARY ANGIOPLASTY: ICD-10-CM

## 2018-03-14 PROCEDURE — 3017F COLORECTAL CA SCREEN DOC REV: CPT | Performed by: INTERNAL MEDICINE

## 2018-03-14 PROCEDURE — G8400 PT W/DXA NO RESULTS DOC: HCPCS | Performed by: INTERNAL MEDICINE

## 2018-03-14 PROCEDURE — G8598 ASA/ANTIPLAT THER USED: HCPCS | Performed by: INTERNAL MEDICINE

## 2018-03-14 PROCEDURE — 1123F ACP DISCUSS/DSCN MKR DOCD: CPT | Performed by: INTERNAL MEDICINE

## 2018-03-14 PROCEDURE — 3014F SCREEN MAMMO DOC REV: CPT | Performed by: INTERNAL MEDICINE

## 2018-03-14 PROCEDURE — 99214 OFFICE O/P EST MOD 30 MIN: CPT | Performed by: INTERNAL MEDICINE

## 2018-03-14 PROCEDURE — G8427 DOCREV CUR MEDS BY ELIG CLIN: HCPCS | Performed by: INTERNAL MEDICINE

## 2018-03-14 PROCEDURE — G8417 CALC BMI ABV UP PARAM F/U: HCPCS | Performed by: INTERNAL MEDICINE

## 2018-03-14 PROCEDURE — G8484 FLU IMMUNIZE NO ADMIN: HCPCS | Performed by: INTERNAL MEDICINE

## 2018-03-14 PROCEDURE — 1090F PRES/ABSN URINE INCON ASSESS: CPT | Performed by: INTERNAL MEDICINE

## 2018-03-14 PROCEDURE — 4040F PNEUMOC VAC/ADMIN/RCVD: CPT | Performed by: INTERNAL MEDICINE

## 2018-03-14 PROCEDURE — 1036F TOBACCO NON-USER: CPT | Performed by: INTERNAL MEDICINE

## 2018-03-14 NOTE — LETTER
[address] Cardiology - [place] [address] [address] [address]  Phone: [phone]  Fax: [phone]    Beth Martinez MD        March 16, 2018     Lolly Garza NELLY  [id number]  [address] [place] [address]    Patient: Brendon Carter  MR Number: [id number]  YOB: 1945  Date of Visit: 3/14/2018    Dear Dr. Tadeo Borjas:    Below are the relevant portions of my assessment and plan of care. [facility]   Cardiac Followup    Referring Provider:  Tadeo Borjas     Chief Complaint   Patient presents with    Hyperlipidemia     no cardiac complaints     Hypertension    Coronary Artery Disease    Palpitations    Shortness of Breath    Tachycardia        History of Present Illness: This 71 y.o. female who is here for a follow up visit. She a history of CAD s/p PCI, carotid disease and hyperlipidemia. She had a  inferior STEMI 8/27/15 s/p emergent cath w/ stent to RCA, post procedure course complicated by pericarditis and significant gi bleeding. Her stress test in 5/2016 showed normal perfusion. S/p left CEA in 7/2014. She had multiple episodes of syncope and underwent ILR on 10/15/2014. She was hospitalized at [facility] 7/2017 with rectal bleeding (? Etiology). Echo was done showing mild to moderate AI. At office visit 2/14/18, she reported mid-sternal chest discomfort unrelated to exertion and DELGADO. Lexiscan myoview showed normal perfusion. Echo showed normal EF and mild AI. Lisinopril 10 mg daily was added to improve elevated BP readings from home. She also reported LE edema when legs in dependent position for longer periods of time. Compression hose was recommended for long car rides. She also lost 12 lbs recently with dietary changes. Today, she states she does not have swelling often and only occurs when she riding in the car for long hours.   She is not wearing compression hose as they were very expensive. She had a sore throat recently due to enlarged saliva gland. She denies exertional chest pain or shortness of breath. She has arthritis and follows with rheumatologist at [place] [address]. Past Medical History:   has a past medical history of Arthritis; Blood transfusion; CAD (coronary artery disease); Coronary stent; Febrile seizure ([facility] [address] Gastroesophageal reflux disease; History of blood transfusion; Hyperlipidemia; and Hypertension. Surgical History:   has a past surgical history that includes Hysterectomy; Cholecystectomy; Coronary angioplasty with stent (3/2009); Appendectomy; Hand surgery; Intracapsular cataract extraction (Bilateral, [id number]); fracture surgery (01/2013); eye surgery (12/19/2012. 12/30/2012); other surgical history (1/17/14); Carotid endarterectomy (Left, 7-8-14); ablation of dysrhythmic focus (7/6/2015); Insertable Cardiac Monitor (10/15/2014); skin biopsy; Coronary angioplasty with stent (1/24/2012); Coronary angioplasty with stent (8/27/2015); Colonoscopy (2007); and Upper gastrointestinal endoscopy (06/20/2016). Social History:   reports that she quit smoking about 54 years ago. She has a 0.50 pack-year smoking history. She has never used smokeless tobacco. She reports that she drinks alcohol. She reports that she does not use drugs. Family History:  family history is not on file. She was adopted. Home Medications:  Prior to Admission medications    Medication Sig Start Date End Date Taking?  Authorizing Provider   lisinopril (PRINIVIL;ZESTRIL) 10 MG tablet Take 1 tablet by mouth daily 2/14/18  Yes Mary Montejo MD   pantoprazole (PROTONIX) 40 MG tablet Take 1 tablet by mouth 2 times daily prescribing med until patient can get into GI doctor 2/14/18  Yes Mary Montejo MD   betaxolol Lisa Duster) 10 MG tablet Take 1 tablet by mouth 2 times daily 8/8/17  Yes Mary Montejo MD

## 2018-03-16 NOTE — COMMUNICATION BODY
9/22/16  Yes Fidel Orosco NP   HYDROcodone-acetaminophen (NORCO) 5-325 MG per tablet Take 1 tablet by mouth every 6 hours as needed for Pain   Yes Historical Provider, MD   acyclovir (ZOVIRAX) 200 MG capsule Take 200 mg by mouth daily   Yes Historical Provider, MD   venlafaxine (EFFEXOR) 75 MG tablet Take 75 mg by mouth daily   Yes Historical Provider, MD   polycarbophil (FIBER-LAX) 625 MG tablet Take 625 mg by mouth daily   Yes Historical Provider, MD   Calcium Carb-Cholecalciferol (CALCIUM + D3) 600-200 MG-UNIT TABS Take 1 tablet by mouth daily   Yes Historical Provider, MD   Biotin 1000 MCG TABS Take by mouth Daily   Yes Historical Provider, MD   vitamin B-12 (CYANOCOBALAMIN) 100 MCG tablet Take 50 mcg by mouth daily. Yes Historical Provider, MD   docusate sodium (COLACE) 100 MG capsule   Take 200 mg by mouth nightly    Yes Historical Provider, MD   aspirin 81 MG EC tablet Take 81 mg by mouth daily. Yes Historical Provider, MD   Multiple Vitamins-Minerals (THERAPEUTIC MULTIVITAMIN-MINERALS) tablet Take 1 tablet by mouth daily. Yes Historical Provider, MD        Allergies:  Codeine; Nsaids; Prochlorperazine; Prochlorperazine edisylate; Sulfa antibiotics; and Coreg [carvedilol]     Review of Systems:   A 10 point review of systems is negative except as noted in the history of present illness. Physical Examination:    Vitals:    03/14/18 1236   BP: 120/79   Pulse: 87   SpO2: 91%   Stable. Constitutional and General Appearance: NAD   Respiratory:  · Normal excursion and expansion without use of accessory muscles  · Resp Auscultation: Normal breath sounds without dullness  Cardiovascular:  · The apical impulses not displaced  · JVD is normal  · The carotid upstroke is normal in amplitude and contour without delay or bruit  · Normal S1S2, No S3, No Murmur. Regular rate and rhythm  · Peripheral pulses are symmetrical and full  · There is no clubbing, cyanosis of the extremities.   · No edema  · Pedal Pulses: 2+ and equal   Abdomen:  · No masses or tenderness  · Liver/Spleen: No Abnormalities Noted  Neurological/Psychiatric:  · Alert and oriented in all spheres  · Moves all extremities well  · Exhibits normal gait balance and coordination  · No abnormalities of mood, affect, memory, mentation, or behavior are noted      Echo 2/22/18:  Normal left ventricle size, wall thickness, and systolic function with an   EF of 65%. Aortic valve appears sclerotic but opens adequately. Mild aortic regurgitation. Mild mitral annular calcification is present. Trace mitral regurgitation noted. Trivial tricuspid regurgitation with a RVSP estimation of 29 mmHg. Lexiscan myoview GXT 2/22/18:  No EKG evidence for ischemia with lexiscan   Normal LV size and systolic function. There is normal isotope uptake at stress and rest. There is no evidence of  myocardial ischemia or scar.       Carotid US 8/2017:  Right:    50-80% stenosis of the internal carotid artery based on velocity criteria    and B-Mode image.    <50% stenosis of the common carotid artery.    >50% stenosis of the external carotid artery.    Unremarkable subclavian and vertebral arteries.        Left:    <50% stenosis of the internal carotid artery based on velocity criteria and    B-Mode image.    <50% stenosis of the common carotid artery.    <50% stenosis of the external carotid artery.    Unremarkable subclavian and vertebral arteries.         [facility]/17: [facility]    Left Atrium: Moderately enlarged     Left Ventricle: Normal LV size and  hyperdynamic LV function, EF is greater than 75 percent, mild LVH, abnormal  relaxation     Right Atrium: normal size     Right Ventricle: normal size and  function     Aortic Valve: appears normal, normal mobility, mild to moderate  regurgitation     Aorta: aortic root is normal in size     IVC/PA: IVC size is  normal     Mitral Valve: moderate annular calcification, mildly thickened  leaflets, trivial regurgitation     Tricuspid Valve: appears normal, mild  regurgitation, RVSP is 32 mmHg     Pulmonic Valve: appears normal, trivial  regurgitation     Pericardium: no significant effusion seen  Conclusions:  indication is dizziness Normal LV size, hyperdynamic LV function with EF greater  than 75 percent, abnormal relaxation Mild-to-moderate aortic regurgitation, mild  tricuspid regurgitation  Electronically signed by MD Bossman Bolivar on 07/09/2017 at 04:17 PM (No Signature  Object)    Carotid US 2/2017:  Right:    50-80% stenosis of the internal carotid artery based on velocity criteria    and Bmode image.    <50% stenosis of the common carotid artery.    >>50% stenosis of the external carotid artery.    Unremarkable subclavian and vertebral arteries.        Left:    <50% stenosis of the internal carotid artery based on velocity criteria and    Bmode image.    <50% stenosis of the common carotid artery.    <50% stenosis of the external carotid artery.    Unremarkable subclavian and vertebral arteries.      Carotid US 8/2016   Right:    50-80% stenosis of the internal carotid artery based on velocity criteria    and Bmode image.    <50% stenosis of the common carotid artery.    >50% stenosis of the external carotid artery.    Unremarkable subclavian and vertebral arteries.        Left:    1-15% stenosis of the internal carotid artery based on velocity criteria and    Bmode image.    <50% stenosis of the common carotid artery.    <50% stenosis of the external carotid artery.    Unremarkable subclavian and vertebral arteries        Recommendations    In comparison to the previous exam 01/19/2016 there are no significant    changes.    Follow-up to be done on or after 02/19/2017.           Stress Myoview 5/2016  Summary   No EKG evidence for ischemia with exercise   Preserved LV systolic function   Myocardial perfusion imaging with no evidence for ischemia with exercise   Stress Protocols    Resting ECG    NSR     Echo:

## 2018-04-04 ENCOUNTER — PROCEDURE VISIT (OUTPATIENT)
Dept: CARDIOLOGY CLINIC | Age: 73
End: 2018-04-04

## 2018-04-04 ENCOUNTER — OFFICE VISIT (OUTPATIENT)
Dept: CARDIOLOGY CLINIC | Age: 73
End: 2018-04-04

## 2018-04-04 VITALS
OXYGEN SATURATION: 98 % | WEIGHT: 170 LBS | HEIGHT: 62 IN | HEART RATE: 93 BPM | BODY MASS INDEX: 31.28 KG/M2 | DIASTOLIC BLOOD PRESSURE: 79 MMHG | SYSTOLIC BLOOD PRESSURE: 118 MMHG

## 2018-04-04 DIAGNOSIS — Z45.09 ENCOUNTER FOR ELECTRONIC ANALYSIS OF REVEAL EVENT RECORDER: ICD-10-CM

## 2018-04-04 DIAGNOSIS — I10 ESSENTIAL HYPERTENSION, BENIGN: ICD-10-CM

## 2018-04-04 DIAGNOSIS — I25.10 CORONARY ARTERY DISEASE INVOLVING NATIVE CORONARY ARTERY OF NATIVE HEART WITHOUT ANGINA PECTORIS: ICD-10-CM

## 2018-04-04 DIAGNOSIS — R00.2 PALPITATIONS: ICD-10-CM

## 2018-04-04 DIAGNOSIS — R07.89 OTHER CHEST PAIN: ICD-10-CM

## 2018-04-04 DIAGNOSIS — R55 SYNCOPE, UNSPECIFIED SYNCOPE TYPE: Primary | ICD-10-CM

## 2018-04-04 PROCEDURE — G8417 CALC BMI ABV UP PARAM F/U: HCPCS | Performed by: INTERNAL MEDICINE

## 2018-04-04 PROCEDURE — 1090F PRES/ABSN URINE INCON ASSESS: CPT | Performed by: INTERNAL MEDICINE

## 2018-04-04 PROCEDURE — 1036F TOBACCO NON-USER: CPT | Performed by: INTERNAL MEDICINE

## 2018-04-04 PROCEDURE — G8427 DOCREV CUR MEDS BY ELIG CLIN: HCPCS | Performed by: INTERNAL MEDICINE

## 2018-04-04 PROCEDURE — 4040F PNEUMOC VAC/ADMIN/RCVD: CPT | Performed by: INTERNAL MEDICINE

## 2018-04-04 PROCEDURE — 3017F COLORECTAL CA SCREEN DOC REV: CPT | Performed by: INTERNAL MEDICINE

## 2018-04-04 PROCEDURE — 3014F SCREEN MAMMO DOC REV: CPT | Performed by: INTERNAL MEDICINE

## 2018-04-04 PROCEDURE — G8400 PT W/DXA NO RESULTS DOC: HCPCS | Performed by: INTERNAL MEDICINE

## 2018-04-04 PROCEDURE — G8598 ASA/ANTIPLAT THER USED: HCPCS | Performed by: INTERNAL MEDICINE

## 2018-04-04 PROCEDURE — 1123F ACP DISCUSS/DSCN MKR DOCD: CPT | Performed by: INTERNAL MEDICINE

## 2018-04-04 PROCEDURE — 99214 OFFICE O/P EST MOD 30 MIN: CPT | Performed by: INTERNAL MEDICINE

## 2018-04-04 PROCEDURE — 93291 INTERROG DEV EVAL SCRMS IP: CPT | Performed by: INTERNAL MEDICINE

## 2018-04-25 RX ORDER — FENOFIBRATE 160 MG/1
160 TABLET ORAL DAILY
Qty: 90 TABLET | Refills: 3 | Status: SHIPPED | OUTPATIENT
Start: 2018-04-25 | End: 2019-09-25 | Stop reason: SDUPTHER

## 2018-06-04 ENCOUNTER — PROCEDURE VISIT (OUTPATIENT)
Dept: CARDIOLOGY CLINIC | Age: 73
End: 2018-06-04

## 2018-06-04 DIAGNOSIS — Z09 FOLLOW UP: Primary | ICD-10-CM

## 2018-07-02 RX ORDER — ROSUVASTATIN CALCIUM 40 MG/1
TABLET, COATED ORAL
Qty: 30 TABLET | Refills: 10 | Status: SHIPPED | OUTPATIENT
Start: 2018-07-02 | End: 2019-07-20 | Stop reason: SDUPTHER

## 2018-08-08 DIAGNOSIS — I65.23 ASYMPTOMATIC CAROTID ARTERY STENOSIS, BILATERAL: Primary | ICD-10-CM

## 2018-08-30 ENCOUNTER — HOSPITAL ENCOUNTER (OUTPATIENT)
Dept: VASCULAR LAB | Age: 73
Discharge: HOME | End: 2018-08-30
Payer: COMMERCIAL

## 2018-08-30 DIAGNOSIS — I65.23 ASYMPTOMATIC CAROTID ARTERY STENOSIS, BILATERAL: ICD-10-CM

## 2018-08-30 PROCEDURE — 93880 EXTRACRANIAL BILAT STUDY: CPT

## 2018-08-31 ENCOUNTER — TELEPHONE (OUTPATIENT)
Dept: VASCULAR SURGERY | Age: 73
End: 2018-08-31

## 2018-09-17 NOTE — PROGRESS NOTES
[address]    2018    Brooklynn Montejo (:  1945) is a 68 y.o. female who is here in follow up for management of coronary artery disease. Referring Provider: Akiko Beltre    HISTORY: Ms Bella Brewer has a history of coronary artery disease with IWMI and PCI to RCA 8/27/15. She developed pericarditis and significant gi bleeding post intervension. S/p left CEA in 2014. She had multiple episodes of syncope and underwent ILR on 10/15/2014 and was removed 18. She was hospitalized at [facility] 2017 with rectal bleeding (? Etiology). Echo was done showing mild to moderate AI. Today, her main complaint is of right hip and knee pain which was worse for her this morning. She follows with a rheumatologist and pain management physician. She denies exertional chest pain, but has shortness of breath with exertion. She denies heart racing. She has lost 24 lbs since the beginning of the year and wants to lose an additional 10 lbs if possible. REVIEW OF SYSTEMS:  A complete review of systems was reviewed and is negative except as noted in the history of present illness. Prior to Visit Medications    Medication Sig Taking? Authorizing Provider   rosuvastatin (CRESTOR) 40 MG tablet TAKE ONE TABLET BY MOUTH EVERY EVENING Yes Katt Saba MD   fenofibrate 160 MG tablet Take 1 tablet by mouth daily Yes Katt Saba MD   lisinopril (PRINIVIL;ZESTRIL) 10 MG tablet Take 1 tablet by mouth daily Yes Katt Saba MD   pantoprazole (PROTONIX) 40 MG tablet Take 1 tablet by mouth 2 times daily prescribing med until patient can get into GI doctor Yes Katt Saba MD   nitroGLYCERIN (NITROSTAT) 0.4 MG SL tablet DISSOLVE 1 TAB UNDER TONGUE FOR CHEST PAIN - IF PAIN REMAINS AFTER 5 MIN, CALL 911 AND REPEAT DOSE.  MAX 3 TABS IN 15 MINUTES Yes MARIELENA Obrien - LIS   HYDROcodone-acetaminophen (NORCO) 5-325 MG per tablet Take 1 tablet by mouth every 6 hours as LEFT CAROTID ENDARTERECTOMY               CHOLECYSTECTOMY      COLONOSCOPY  2007    CORONARY ANGIOPLASTY WITH STENT PLACEMENT  3/2009    @BN - DWAYNE X 2 to RCA    CORONARY ANGIOPLASTY WITH STENT PLACEMENT  1/24/2012    Dr Villa Ceballos to [address]  8/27/2015    [facility] Dr Soha RICE/[place] to [address]  12/19/2012. 12/30/2012    bilateral    FRACTURE SURGERY  01/2013    ORIF right wrist    HAND SURGERY      Left hand    HYSTERECTOMY      INSERTABLE CARDIAC MONITOR  10/15/2014    Dr Benji Buck, [facility] [address] EXTRACTION Bilateral [id number]    rt     OTHER SURGICAL HISTORY  1/17/14    excision right nasal dorsum epidermal cyst with frozen section lesion superior nasal dorsem    SKIN BIOPSY      UPPER GASTROINTESTINAL ENDOSCOPY  06/20/2016       Social History   Substance Use Topics    Smoking status: Former Smoker     Packs/day: 1.00     Years: 0.50     Quit date: 1/1/1964    Smokeless tobacco: Never Used      Comment: smoked 2-3 months when 19    Alcohol use 0.0 oz/week      Comment: occasionally        Family History   Problem Relation Age of Onset    Adopted: Yes       PHYSICAL EXAMINATION:  Vitals:    09/19/18 1349 09/19/18 1353 09/19/18 1445   BP: (!) 140/100 (!) 138/100 130/78   Site: Right Upper Arm Right Upper Arm    Position: Sitting Sitting    Cuff Size: Medium Adult Medium Adult    Pulse: 80     Weight: 160 lb (72.6 kg)     Height: 5' 2\" (1.575 m)       Estimated body mass index is 29.26 kg/m² as calculated from the following:    Height as of this encounter: 5' 2\" (1.575 m). Weight as of this encounter: 160 lb (72.6 kg).     General Appearance: No apparent distress  Eyes:  · Conjunctiva clear  · Pupils equal, round, reactive to light  ENT:  · External Ears and Nose unremarkable  · Oral mucosa is moist  Respiratory:  · Normal excursion and expansion without use of accessory muscles  · Resp Auscultation: Normal breath sounds without insertion of drug-eluting stent into right coronary artery for coronary artery disease  ~  [facility] 8/2015 showed prox RCA stent occluded with nonobstructive disease in mid RCA, PDA, LAD, and OM-1.  EF 50% > s/p DWAYNE to in-stent restenosis of RCA  ~ MPI  In 5/2016 and 2/2018 showed normal perfusion.    ~ Treated with ASA, statin. Off Plavix on 5/25/18 at the time of ILR removal (difficult removal as device migrated to ribs). ~ no exertional chest pain    Plan > continue current medications    4. Essential hypertension  ~ treated with Lisinopril  ~ Blood pressure 130/78, pulse 80, height 5' 2\" (1.575 m), weight 160 lb (72.6 kg), not currently breastfeeding. Plan > continue current medications    5. Dyslipidemia  ~ treated with Fenofibrate and Crestor 40 mg  ~ 10/2017 TC- 185, TG- 349 (was 233 in 7/2017), HDL- 42, LDL- 73. Plan >  Repeat BMP, lipids, liver enzymes, HgbA1C. 6. Nonrheumatic aortic valve insufficiency  ~ 7/2017 Echo ([facility]) > mild to moderate AI  ~ 2/2018 Echo > mild AI with normal EF    Plan > will monitor with serial echoes. Discuss timing of repeat echo at next visit. 7.  Carotid artery disease:  ~ hx of left CEA 7/2014  ~ 8/2018 carotid ultrasound LALY 95-87%, LICA < 33%. Plan > followed by Dr. Matias Geronimo:  1. No change in medications  2. Labs soon - lipids, liver enzymes, BMP, and HgbA1C  3. Continue weight loss efforts  4. Follow up in one year. Scribe's attestation: This note was scribed in the presence of Ally Ayala M.D. by Prachi Sunshine RN    Physician Attestation: The scribe's documentation has been prepared under my direction and personally reviewed by me in its entirety. I confirm that the note above accurately reflects all work, treatment, procedures, and medical decision making performed by me. An  electronic signature was used to authenticate this note. Mario Villarreal MD, [facility] [place], [address]

## 2018-09-19 ENCOUNTER — OFFICE VISIT (OUTPATIENT)
Dept: CARDIOLOGY CLINIC | Age: 73
End: 2018-09-19

## 2018-09-19 VITALS
BODY MASS INDEX: 29.44 KG/M2 | HEART RATE: 80 BPM | HEIGHT: 62 IN | DIASTOLIC BLOOD PRESSURE: 78 MMHG | SYSTOLIC BLOOD PRESSURE: 130 MMHG | WEIGHT: 160 LBS

## 2018-09-19 DIAGNOSIS — I65.21 STENOSIS OF RIGHT CAROTID ARTERY: ICD-10-CM

## 2018-09-19 DIAGNOSIS — I35.1 NONRHEUMATIC AORTIC VALVE INSUFFICIENCY: ICD-10-CM

## 2018-09-19 DIAGNOSIS — I25.83 CORONARY ARTERY DISEASE DUE TO LIPID RICH PLAQUE: Primary | ICD-10-CM

## 2018-09-19 DIAGNOSIS — Z95.5 STATUS POST INSERTION OF DRUG-ELUTING STENT INTO RIGHT CORONARY ARTERY FOR CORONARY ARTERY DISEASE: ICD-10-CM

## 2018-09-19 DIAGNOSIS — Z98.61 CAD S/P PERCUTANEOUS CORONARY ANGIOPLASTY: ICD-10-CM

## 2018-09-19 DIAGNOSIS — E78.5 DYSLIPIDEMIA: ICD-10-CM

## 2018-09-19 DIAGNOSIS — I25.10 CAD S/P PERCUTANEOUS CORONARY ANGIOPLASTY: ICD-10-CM

## 2018-09-19 DIAGNOSIS — I10 ESSENTIAL HYPERTENSION: ICD-10-CM

## 2018-09-19 DIAGNOSIS — R73.09 ELEVATED GLUCOSE: ICD-10-CM

## 2018-09-19 DIAGNOSIS — I25.10 CORONARY ARTERY DISEASE DUE TO LIPID RICH PLAQUE: Primary | ICD-10-CM

## 2018-09-19 PROCEDURE — G8400 PT W/DXA NO RESULTS DOC: HCPCS | Performed by: INTERNAL MEDICINE

## 2018-09-19 PROCEDURE — 1090F PRES/ABSN URINE INCON ASSESS: CPT | Performed by: INTERNAL MEDICINE

## 2018-09-19 PROCEDURE — 99214 OFFICE O/P EST MOD 30 MIN: CPT | Performed by: INTERNAL MEDICINE

## 2018-09-19 PROCEDURE — G8417 CALC BMI ABV UP PARAM F/U: HCPCS | Performed by: INTERNAL MEDICINE

## 2018-09-19 PROCEDURE — 4040F PNEUMOC VAC/ADMIN/RCVD: CPT | Performed by: INTERNAL MEDICINE

## 2018-09-19 PROCEDURE — G8427 DOCREV CUR MEDS BY ELIG CLIN: HCPCS | Performed by: INTERNAL MEDICINE

## 2018-09-19 PROCEDURE — 3017F COLORECTAL CA SCREEN DOC REV: CPT | Performed by: INTERNAL MEDICINE

## 2018-09-19 PROCEDURE — 1123F ACP DISCUSS/DSCN MKR DOCD: CPT | Performed by: INTERNAL MEDICINE

## 2018-09-19 PROCEDURE — G8598 ASA/ANTIPLAT THER USED: HCPCS | Performed by: INTERNAL MEDICINE

## 2018-09-19 PROCEDURE — 1036F TOBACCO NON-USER: CPT | Performed by: INTERNAL MEDICINE

## 2018-09-19 PROCEDURE — 1101F PT FALLS ASSESS-DOCD LE1/YR: CPT | Performed by: INTERNAL MEDICINE

## 2018-09-19 NOTE — LETTER
Yes Ravindra Lawrence MD   pantoprazole (PROTONIX) 40 MG tablet Take 1 tablet by mouth 2 times daily prescribing med until patient can get into GI doctor Yes Ravindra Lawrence MD   nitroGLYCERIN (NITROSTAT) 0.4 MG SL tablet DISSOLVE 1 TAB UNDER TONGUE FOR CHEST PAIN - IF PAIN REMAINS AFTER 5 MIN, CALL 911 AND REPEAT DOSE. MAX 3 TABS IN 15 MINUTES Yes Antonio Antonio, APRN - CNP   HYDROcodone-acetaminophen (NORCO) 5-325 MG per tablet Take 1 tablet by mouth every 6 hours as needed for Pain Yes Historical Provider, MD   acyclovir (ZOVIRAX) 200 MG capsule Take 200 mg by mouth daily Yes Historical Provider, MD   venlafaxine (EFFEXOR) 75 MG tablet Take 75 mg by mouth daily Yes Historical Provider, MD   polycarbophil (FIBER-LAX) 625 MG tablet Take 625 mg by mouth daily Yes Historical Provider, MD   Calcium Carb-Cholecalciferol (CALCIUM + D3) 600-200 MG-UNIT TABS Take 1 tablet by mouth daily Yes Historical Provider, MD   Biotin 1000 MCG TABS Take by mouth Daily Yes Historical Provider, MD   vitamin B-12 (CYANOCOBALAMIN) 100 MCG tablet Take 50 mcg by mouth daily. Yes Historical Provider, MD   docusate sodium (COLACE) 100 MG capsule   Take 200 mg by mouth nightly  Yes Historical Provider, MD   aspirin 81 MG EC tablet Take 81 mg by mouth daily. Yes Historical Provider, MD   Multiple Vitamins-Minerals (THERAPEUTIC MULTIVITAMIN-MINERALS) tablet Take 1 tablet by mouth daily.  Yes Historical Provider, MD   Elastic Bandages & Supports ([facility]) [address] each by Does not apply route daily as needed (swelling) Knee high 15-20 mmHg  Ravindra Lawrence MD        Allergies   Allergen Reactions    Codeine Itching and Hives    Nsaids      Bleeding ulcer  Gi bleed    Prochlorperazine Anaphylaxis    Prochlorperazine Edisylate Swelling    Sulfa Antibiotics Itching and Hives    Coreg [Carvedilol] Itching       Past Medical History:   Diagnosis Date    Arthritis     bilateral hands, bilateral knees, neck  Blood transfusion     CAD (coronary artery disease)     Coronary stent 8/27/2015 most recent    RCA stents on 3 occasions 2009, 2012, 2015    Febrile seizure ([address] 1952    Gastroesophageal reflux disease     History of blood transfusion     Hyperlipidemia     Hypertension        Past Surgical History:   Procedure Laterality Date    ABLATION OF DYSRHYTHMIC FOCUS  7/6/2015    Dr Thai Noriega, [facility], sinus node modification     APPENDECTOMY      CAROTID ENDARTERECTOMY Left 7-8-14    LEFT CAROTID ENDARTERECTOMY               CHOLECYSTECTOMY      COLONOSCOPY  2007    CORONARY ANGIOPLASTY WITH STENT PLACEMENT  3/2009    @BN - DWAYNE X 2 to RCA    CORONARY ANGIOPLASTY WITH STENT PLACEMENT  1/24/2012    Dr Fronie Mohs, [place] to [address]  8/27/2015    [facility] Dr Power Parkinson DWAYNE/[place] to [address]  12/19/2012. 12/30/2012    bilateral    FRACTURE SURGERY  01/2013    ORIF right wrist    HAND SURGERY      Left hand    HYSTERECTOMY      INSERTABLE CARDIAC MONITOR  10/15/2014    Dr Thai Noriega, [address] EXTRACTION Bilateral [id number]    rt     OTHER SURGICAL HISTORY  1/17/14    excision right nasal dorsum epidermal cyst with frozen section lesion superior nasal dorsem    SKIN BIOPSY      UPPER GASTROINTESTINAL ENDOSCOPY  06/20/2016       Social History   Substance Use Topics    Smoking status: Former Smoker     Packs/day: 1.00     Years: 0.50     Quit date: 1/1/1964    Smokeless tobacco: Never Used      Comment: smoked 2-3 months when 19    Alcohol use 0.0 oz/week      Comment: occasionally        Family History   Problem Relation Age of Onset    Adopted: Yes       PHYSICAL EXAMINATION:  Vitals:    09/19/18 1349 09/19/18 1353 09/19/18 1445   BP: (!) 140/100 (!) 138/100 130/78   Site: Right Upper Arm Right Upper Arm    Position: Sitting Sitting    Cuff Size: Medium Adult Medium Adult    Pulse: 80     Weight: 160 lb (72.6 kg)     Height: 5' 2\" (1.575 m) function. There is normal isotope uptake at stress and rest. There is no evidence of  myocardial ischemia or scar. Carotid US 8/2017:  Right:    50-80% stenosis of the internal carotid artery based on velocity criteria    and B-Mode image. <50% stenosis of the common carotid artery. >50% stenosis of the external carotid artery. Unremarkable subclavian and vertebral arteries. Left:    <50% stenosis of the internal carotid artery based on velocity criteria and    B-Mode image. <50% stenosis of the common carotid artery. <50% stenosis of the external carotid artery. Unremarkable subclavian and vertebral arteries. Echo 7/7/17: ([address])  Conclusions:  Normal LV size, hyperdynamic LV function with EF greater  than 75 percent, abnormal relaxation Mild-to-moderate aortic regurgitation, mild tricuspid regurgitation    Stress Myoview 5/2016  Summary   No EKG evidence for ischemia with exercise   Preserved LV systolic function   Myocardial perfusion imaging with no evidence for ischemia with exercise   Stress Protocols    Resting ECG    NSR      Echo: 1/19/16  The left ventricle is normal in size. Normal left ventricular systolic function, with estimated ejection fraction of 55%-60%. No regional wall motion abnormalities noted. Mild concentric left ventricular hypertrophy is present. Diastolic filling parameters suggests grade I diastolic dysfunction. Aortic valve is not well visualized but appears mildly sclerotic and opens adequately. Mild mitral and aortic regurgitation are present. Trivial tricuspid regurgitation. Systolic pulmonary artery pressure (SPAP) is normal and estimated at 27mmHg (RA pressure 3 mm Hg).        CATH: 8/27/15  C SUMMARY  ~LM normal  ~LAD 40% mid  ~Cx 20% OM1  ~% prox instent, 50% mid, 50% PDA,   (anomalous anterior and vertical takeoff, AL1 engaged adequately, JR4 did not engage ostium)  ~LVG 50%, inferior hk     Impression ~Angiography w/ acute RCA occlusion  ~LVEDP 20  ~LVG with borderline EF with inferior RWMA    Intervention  ~PCI of RCA with 3.6X04MYL (14ATM)     Cardiac Cath 12/3/13:  ANGIOGRAPHIC FINDINGS:   1. Mild-to-moderate nonobstructive coronary artery disease with patent right   coronary artery stents from prior procedures. 2. Normal left ventricular function   3. Normal hemodynamics. ASSESSMENT/PLAN:  1. Coronary artery disease due to lipid rich plaque  2. CAD S/P percutaneous coronary angioplasty  3. Status post insertion of drug-eluting stent into right coronary artery for coronary artery disease  ~  [facility] 8/2015 showed prox RCA stent occluded with nonobstructive disease in mid RCA, PDA, LAD, and OM-1.  EF 50% > s/p DWAYNE to in-stent restenosis of RCA  ~ MPI  In 5/2016 and 2/2018 showed normal perfusion.    ~ Treated with ASA, statin. Off Plavix on 5/25/18 at the time of ILR removal (difficult removal as device migrated to ribs). ~ no exertional chest pain    Plan > continue current medications    4. Essential hypertension  ~ treated with Lisinopril  ~ Blood pressure 130/78, pulse 80, height 5' 2\" (1.575 m), weight 160 lb (72.6 kg), not currently breastfeeding. Plan > continue current medications    5. Dyslipidemia  ~ treated with Fenofibrate and Crestor 40 mg  ~ 10/2017 TC- 185, TG- 349 (was 233 in 7/2017), HDL- 42, LDL- 73. Plan >  Repeat BMP, lipids, liver enzymes, HgbA1C. 6. Nonrheumatic aortic valve insufficiency  ~ [facility] [facility] > mild to moderate AI  ~ 2/2018 Echo > mild AI with normal EF    Plan > will monitor with serial echoes. Discuss timing of repeat echo at next visit. 7.  Carotid artery disease:  ~ hx of left CEA 7/2014  ~ 8/2018 carotid ultrasound LALY 55-86%, LICA < 01%. Plan > followed by Dr. Brent Sood:  1. No change in medications  2. Labs soon - lipids, liver enzymes, BMP, and HgbA1C  3. Continue weight loss efforts  4. Follow up in one year. Scribe's attestation: This note was scribed in the presence of Samuel Kent M.D. by Marbella Arriaga RN    Physician Attestation: The scribe's documentation has been prepared under my direction and personally reviewed by me in its entirety. I confirm that the note above accurately reflects all work, treatment, procedures, and medical decision making performed by me. An  electronic signature was used to authenticate this note. Edith Ba MD, Diana Hernandez      If you have questions, please do not hesitate to call me. I look forward to following Torie Haveheidi along with you.     Sincerely,        Samuel Kent MD

## 2018-10-01 NOTE — COMMUNICATION BODY
[address]    2018    Azeb Porter (:  1945) is a 68 y.o. female who is here in follow up for management of coronary artery disease. Referring Provider: Manjula Guerrier    HISTORY: Ms Saleem Rausch has a history of coronary artery disease with IWMI and PCI to RCA 8/27/15. She developed pericarditis and significant gi bleeding post intervension. S/p left CEA in 2014. She had multiple episodes of syncope and underwent ILR on 10/15/2014 and was removed 18. She was hospitalized at [facility] 2017 with rectal bleeding (? Etiology). Echo was done showing mild to moderate AI. Today, her main complaint is of right hip and knee pain which was worse for her this morning. She follows with a rheumatologist and pain management physician. She denies exertional chest pain, but has shortness of breath with exertion. She denies heart racing. She has lost 24 lbs since the beginning of the year and wants to lose an additional 10 lbs if possible. REVIEW OF SYSTEMS:  A complete review of systems was reviewed and is negative except as noted in the history of present illness. Prior to Visit Medications    Medication Sig Taking? Authorizing Provider   rosuvastatin (CRESTOR) 40 MG tablet TAKE ONE TABLET BY MOUTH EVERY EVENING Yes Iban Reynolds MD   fenofibrate 160 MG tablet Take 1 tablet by mouth daily Yes Iban Reynolds MD   lisinopril (PRINIVIL;ZESTRIL) 10 MG tablet Take 1 tablet by mouth daily Yes Iban Reynolds MD   pantoprazole (PROTONIX) 40 MG tablet Take 1 tablet by mouth 2 times daily prescribing med until patient can get into GI doctor Yes Iban Reynolds MD   nitroGLYCERIN (NITROSTAT) 0.4 MG SL tablet DISSOLVE 1 TAB UNDER TONGUE FOR CHEST PAIN - IF PAIN REMAINS AFTER 5 MIN, CALL 911 AND REPEAT DOSE.  MAX 3 TABS IN 15 MINUTES Yes Anola Muck, APRN - CNP   HYDROcodone-acetaminophen (NORCO) 5-325 MG per tablet Take 1 tablet by mouth every 6 hours as dullness  Cardiovascular:  · JVD is normal  · The carotid upstroke is normal in amplitude and contour without delay or bruit  · Apical impulse is not displaced  · Normal S1, S2. No S3. No Murmur  · No edema  · Pedal Pulses: 2+ and equal   Abdomen:  · No masses or tenderness  · Liver/Spleen: No Abnormalities Noted  Musculoskeletal:  · Fingers without clubbing or cyanosis  · Normal Gait  Skin:  · No rash  · Normal skin turgor   Neurologic/Psychiatric:  · Alert and oriented in all spheres  · Normal mood and affect  · Memory and mentation intact      Carotid ultrasound 8/30/18:      Summary        1. There is moderate plaque seen in the right internal carotid artery with    an estimated diameter reduction of 50-69%.    2. There is minimal plaque seen in the left internal carotid artery with an    estimated diameter reduction of <50%.    3. The vertebral arteries are patent with antegrade flow bilaterally.    4. There is a >50% stenosis involving the right external carotid artery. Echo 2/22/18:  Normal left ventricle size, wall thickness, and systolic function with an   EF of 65%. Aortic valve appears sclerotic but opens adequately. Mild aortic regurgitation. Mild mitral annular calcification is present. Trace mitral regurgitation noted. Trivial tricuspid regurgitation with a RVSP estimation of 29 mmHg. Lexiscan myoview GXT 2/22/18:  No EKG evidence for ischemia with lexiscan   Normal LV size and systolic function. There is normal isotope uptake at stress and rest. There is no evidence of  myocardial ischemia or scar. Carotid US 8/2017:  Right:    50-80% stenosis of the internal carotid artery based on velocity criteria    and B-Mode image. <50% stenosis of the common carotid artery. >50% stenosis of the external carotid artery. Unremarkable subclavian and vertebral arteries. Left:    <50% stenosis of the internal carotid artery based on velocity criteria and    B-Mode image. <50% stenosis of the common carotid artery. <50% stenosis of the external carotid artery. Unremarkable subclavian and vertebral arteries. Echo 7/7/17: ([address])  Conclusions:  Normal LV size, hyperdynamic LV function with EF greater  than 75 percent, abnormal relaxation Mild-to-moderate aortic regurgitation, mild tricuspid regurgitation    Stress Myoview 5/2016  Summary   No EKG evidence for ischemia with exercise   Preserved LV systolic function   Myocardial perfusion imaging with no evidence for ischemia with exercise   Stress Protocols    Resting ECG    NSR      Echo: 1/19/16  The left ventricle is normal in size. Normal left ventricular systolic function, with estimated ejection fraction of 55%-60%. No regional wall motion abnormalities noted. Mild concentric left ventricular hypertrophy is present. Diastolic filling parameters suggests grade I diastolic dysfunction. Aortic valve is not well visualized but appears mildly sclerotic and opens adequately. Mild mitral and aortic regurgitation are present. Trivial tricuspid regurgitation. Systolic pulmonary artery pressure (SPAP) is normal and estimated at 27mmHg (RA pressure 3 mm Hg). CATH: 8/27/15  [facility] SUMMARY  ~LM normal  ~LAD 40% mid  ~Cx 20% OM1  ~% prox instent, 50% mid, 50% PDA,   (anomalous anterior and vertical takeoff, AL1 engaged adequately, JR4 did not engage ostium)  ~LVG 50%, inferior hk     Impression  ~Angiography w/ acute RCA occlusion  ~LVEDP 20  ~LVG with borderline EF with inferior RWMA    Intervention  ~PCI of RCA with 3.3X36XZN (14ATM)     Cardiac Cath 12/3/13:  ANGIOGRAPHIC FINDINGS:   1. Mild-to-moderate nonobstructive coronary artery disease with patent right   coronary artery stents from prior procedures. 2. Normal left ventricular function   3. Normal hemodynamics. ASSESSMENT/PLAN:  1. Coronary artery disease due to lipid rich plaque  2. CAD S/P percutaneous coronary angioplasty  3.  Status post insertion of drug-eluting stent into right coronary artery for coronary artery disease  ~  [facility] 8/2015 showed prox RCA stent occluded with nonobstructive disease in mid RCA, PDA, LAD, and OM-1.  EF 50% > s/p DWAYNE to in-stent restenosis of RCA  ~ MPI  In 5/2016 and 2/2018 showed normal perfusion.    ~ Treated with ASA, statin. Off Plavix on 5/25/18 at the time of ILR removal (difficult removal as device migrated to ribs). ~ no exertional chest pain    Plan > continue current medications    4. Essential hypertension  ~ treated with Lisinopril  ~ Blood pressure 130/78, pulse 80, height 5' 2\" (1.575 m), weight 160 lb (72.6 kg), not currently breastfeeding. Plan > continue current medications    5. Dyslipidemia  ~ treated with Fenofibrate and Crestor 40 mg  ~ 10/2017 TC- 185, TG- 349 (was 233 in 7/2017), HDL- 42, LDL- 73. Plan >  Repeat BMP, lipids, liver enzymes, HgbA1C. 6. Nonrheumatic aortic valve insufficiency  ~ 7/2017 Echo ([address] [facility] [address] > mild to moderate AI  ~ 2/2018 Echo > mild AI with normal EF    Plan > will monitor with serial echoes. Discuss timing of repeat echo at next visit. 7.  Carotid artery disease:  ~ hx of left CEA 7/2014  ~ 8/2018 carotid ultrasound LALY 20-71%, LICA < 79%. Plan > followed by Dr. Gualberto Romero:  1. No change in medications  2. Labs soon - lipids, liver enzymes, BMP, and HgbA1C  3. Continue weight loss efforts  4. Follow up in one year. Scribe's attestation: This note was scribed in the presence of Luna Worley M.D. by Lucille Leblanc RN    Physician Attestation: The scribe's documentation has been prepared under my direction and personally reviewed by me in its entirety. I confirm that the note above accurately reflects all work, treatment, procedures, and medical decision making performed by me. An  electronic signature was used to authenticate this note. Sandy Allen MD, [facility] [place], [address]

## 2018-10-17 RX ORDER — BETAXOLOL 10 MG/1
TABLET, FILM COATED ORAL
Qty: 180 TABLET | Refills: 2 | Status: SHIPPED | OUTPATIENT
Start: 2018-10-17 | End: 2019-09-25 | Stop reason: SDUPTHER

## 2018-12-28 DIAGNOSIS — I65.23 CAROTID ARTERY STENOSIS, ASYMPTOMATIC, BILATERAL: Primary | ICD-10-CM

## 2019-07-22 RX ORDER — ROSUVASTATIN CALCIUM 40 MG/1
TABLET, COATED ORAL
Qty: 30 TABLET | Refills: 9 | Status: SHIPPED | OUTPATIENT
Start: 2019-07-22 | End: 2020-07-17

## 2019-08-13 ENCOUNTER — HOSPITAL ENCOUNTER (OUTPATIENT)
Dept: VASCULAR LAB | Age: 74
Discharge: HOME | End: 2019-08-13
Payer: COMMERCIAL

## 2019-08-13 DIAGNOSIS — I65.23 CAROTID ARTERY STENOSIS, ASYMPTOMATIC, BILATERAL: ICD-10-CM

## 2019-08-13 PROCEDURE — 93880 EXTRACRANIAL BILAT STUDY: CPT

## 2019-08-14 ENCOUNTER — TELEPHONE (OUTPATIENT)
Dept: VASCULAR SURGERY | Age: 74
End: 2019-08-14

## 2019-09-25 ENCOUNTER — OFFICE VISIT (OUTPATIENT)
Dept: CARDIOLOGY CLINIC | Age: 74
End: 2019-09-25
Payer: COMMERCIAL

## 2019-09-25 VITALS
BODY MASS INDEX: 29.44 KG/M2 | OXYGEN SATURATION: 98 % | HEART RATE: 82 BPM | SYSTOLIC BLOOD PRESSURE: 110 MMHG | DIASTOLIC BLOOD PRESSURE: 80 MMHG | WEIGHT: 160 LBS | HEIGHT: 62 IN

## 2019-09-25 DIAGNOSIS — I10 ESSENTIAL HYPERTENSION: ICD-10-CM

## 2019-09-25 DIAGNOSIS — I10 ESSENTIAL HYPERTENSION, BENIGN: ICD-10-CM

## 2019-09-25 DIAGNOSIS — E78.5 DYSLIPIDEMIA: ICD-10-CM

## 2019-09-25 DIAGNOSIS — R07.89 CHEST DISCOMFORT: ICD-10-CM

## 2019-09-25 DIAGNOSIS — I65.29 CAROTID ATHEROSCLEROSIS, UNSPECIFIED LATERALITY: ICD-10-CM

## 2019-09-25 DIAGNOSIS — I25.83 CORONARY ARTERY DISEASE DUE TO LIPID RICH PLAQUE: Primary | ICD-10-CM

## 2019-09-25 DIAGNOSIS — I25.118 CORONARY ARTERY DISEASE INVOLVING NATIVE CORONARY ARTERY OF NATIVE HEART WITH OTHER FORM OF ANGINA PECTORIS (HCC): ICD-10-CM

## 2019-09-25 DIAGNOSIS — I35.1 NONRHEUMATIC AORTIC VALVE INSUFFICIENCY: ICD-10-CM

## 2019-09-25 DIAGNOSIS — I25.10 CORONARY ARTERY DISEASE DUE TO LIPID RICH PLAQUE: Primary | ICD-10-CM

## 2019-09-25 DIAGNOSIS — R06.02 SHORTNESS OF BREATH: ICD-10-CM

## 2019-09-25 PROCEDURE — 1036F TOBACCO NON-USER: CPT | Performed by: INTERNAL MEDICINE

## 2019-09-25 PROCEDURE — G8598 ASA/ANTIPLAT THER USED: HCPCS | Performed by: INTERNAL MEDICINE

## 2019-09-25 PROCEDURE — 1090F PRES/ABSN URINE INCON ASSESS: CPT | Performed by: INTERNAL MEDICINE

## 2019-09-25 PROCEDURE — 1123F ACP DISCUSS/DSCN MKR DOCD: CPT | Performed by: INTERNAL MEDICINE

## 2019-09-25 PROCEDURE — 99214 OFFICE O/P EST MOD 30 MIN: CPT | Performed by: INTERNAL MEDICINE

## 2019-09-25 PROCEDURE — 3017F COLORECTAL CA SCREEN DOC REV: CPT | Performed by: INTERNAL MEDICINE

## 2019-09-25 PROCEDURE — G8419 CALC BMI OUT NRM PARAM NOF/U: HCPCS | Performed by: INTERNAL MEDICINE

## 2019-09-25 PROCEDURE — G8427 DOCREV CUR MEDS BY ELIG CLIN: HCPCS | Performed by: INTERNAL MEDICINE

## 2019-09-25 PROCEDURE — 4040F PNEUMOC VAC/ADMIN/RCVD: CPT | Performed by: INTERNAL MEDICINE

## 2019-09-25 PROCEDURE — G8400 PT W/DXA NO RESULTS DOC: HCPCS | Performed by: INTERNAL MEDICINE

## 2019-09-25 RX ORDER — PANTOPRAZOLE SODIUM 40 MG/1
40 TABLET, DELAYED RELEASE ORAL 2 TIMES DAILY
Qty: 60 TABLET | Refills: 0 | Status: SHIPPED | OUTPATIENT
Start: 2019-09-25 | End: 2022-10-04 | Stop reason: RX

## 2019-09-25 RX ORDER — FENOFIBRATE 160 MG/1
160 TABLET ORAL DAILY
Qty: 90 TABLET | Refills: 3 | Status: SHIPPED | OUTPATIENT
Start: 2019-09-25 | End: 2020-12-07

## 2019-09-25 RX ORDER — NITROGLYCERIN 0.4 MG/1
TABLET SUBLINGUAL
Qty: 25 TABLET | Refills: 2 | Status: SHIPPED | OUTPATIENT
Start: 2019-09-25 | End: 2021-07-07 | Stop reason: SDUPTHER

## 2019-09-25 RX ORDER — BETAXOLOL 10 MG/1
TABLET, FILM COATED ORAL
Qty: 180 TABLET | Refills: 2 | Status: SHIPPED | OUTPATIENT
Start: 2019-09-25 | End: 2020-05-26 | Stop reason: SDUPTHER

## 2019-09-25 NOTE — LETTER
[address] Cardiology [place] [address] [address] [address]  Phone: [phone]  Fax: [phone]    Russ Romero MD        2019     Jasmine Elmore [id number] [facility] [address]    Patient: Cynthia Paz  MR Number: [id number]  YOB: 1945  Date of Visit: 2019    Dear Dr. Baron Hernandez:      [address]    2019    Cynthia Paz (:  1945) is a 69 y.o. female here in follow up for CAD. Referring Provider: Baron Hernandez    HISTORY: Ms Mauri Powers has a history of coronary artery disease with IWMI and PCI to RCA 8/27/15. She developed pericarditis and significant gi bleeding post intervension. S/p left CEA in 2014. She had multiple episodes of syncope and underwent ILR on 10/15/2014 and was removed 18. She was hospitalized at [facility] 2017 with rectal bleeding (? Etiology). Echo was done showing mild to moderate AI. Today she reports feelings of chest discomfort/pressure and shortness of breath. She worries she \"is more winded than she should be. \" other wise she denies dizziness and palpitations. Her trigs are elevated from last year and she states she recently went to [facility] where she was not eating the best.       REVIEW OF SYSTEMS:  A complete review of systems was reviewed and is negative except as noted in the history of present illness. Prior to Visit Medications    Medication Sig Taking? Authorizing Provider   nitroGLYCERIN (NITROSTAT) 0.4 MG SL tablet DISSOLVE 1 TAB UNDER TONGUE FOR CHEST PAIN - IF PAIN REMAINS AFTER 5 MIN, CALL 911 AND REPEAT DOSE.  MAX 3 TABS IN 15 MINUTES Yes Russ Romero MD   fenofibrate 160 MG tablet Take 1 tablet by mouth daily Yes Russ Romero MD   betaxolol (KERLONE) 10 MG tablet TAKE ONE TABLET BY MOUTH TWICE A DAY Yes Russ Romero MD   pantoprazole (PROTONIX) 40 MG tablet Take 1 tablet by mouth 2 times daily Procedure Laterality Date    ABLATION OF DYSRHYTHMIC FOCUS  2015    Dr Norma Demarco, [facility], sinus node modification     APPENDECTOMY      CAROTID ENDARTERECTOMY Left 14    LEFT CAROTID ENDARTERECTOMY               CHOLECYSTECTOMY      COLONOSCOPY      CORONARY ANGIOPLASTY WITH STENT PLACEMENT  3/2009    @BN - DWAYNE X 2 to RCA    CORONARY ANGIOPLASTY WITH STENT PLACEMENT  2012    Dr Rao Zelaya, DWAYNE to [address]  2015    A Dr Luca Doherty DWAYNE/Pronto to [address]  2012. 2012    bilateral    FRACTURE SURGERY  2013    ORIF right wrist    HAND SURGERY      Left hand    HYSTERECTOMY      INSERTABLE CARDIAC MONITOR  10/15/2014    Dr Norma Demarco, [address] EXTRACTION Bilateral [id number]    rt     OTHER SURGICAL HISTORY  14    excision right nasal dorsum epidermal cyst with frozen section lesion superior nasal dorsem    SKIN BIOPSY      UPPER GASTROINTESTINAL ENDOSCOPY  2016       Social History     Tobacco Use    Smoking status: Former Smoker     Packs/day: 1.00     Years: 0.50     Pack years: 0.50     Last attempt to quit: 1964     Years since quittin.7    Smokeless tobacco: Never Used    Tobacco comment: smoked 2-3 months when 19   Substance Use Topics    Alcohol use: Yes     Alcohol/week: 0.0 standard drinks     Comment: occasionally        Family History   Adopted: Yes       PHYSICAL EXAMINATION:  Vitals:    19 1528   BP: 110/80   Site: Right Upper Arm   Position: Sitting   Cuff Size: Medium Adult   Pulse: 82   SpO2: 98%   Weight: 160 lb (72.6 kg)   Height: 5' 2\" (1.575 m)     Estimated body mass index is 29.26 kg/m² as calculated from the following:    Height as of this encounter: 5' 2\" (1.575 m). Weight as of this encounter: 160 lb (72.6 kg).     General Appearance: No apparent distress  Eyes:  · Conjunctiva clear  · Pupils equal, round, reactive to light  ENT: · External Ears and Nose unremarkable  · Oral mucosa is moist  Respiratory:  · Normal excursion and expansion without use of accessory muscles  · Resp Auscultation: Normal breath sounds without dullness  Cardiovascular:  · JVD is normal  · The carotid upstroke is normal in amplitude and contour without delay or bruit  · Apical impulse is not displaced  · Normal S1, S2. No S3. No Murmur  · No edema  · Pedal Pulses: 2+ and equal   Abdomen:  · No masses or tenderness  · Liver/Spleen: No Abnormalities Noted  Musculoskeletal:  · Fingers without clubbing or cyanosis  · Normal Gait  Skin:  · No rash  · Normal skin turgor   Neurologic/Psychiatric:  · Alert and oriented in all spheres  · Normal mood and affect  · Memory and mentation intact    I have reviewed all pertinent lab results and diagnostic testing. Carotid US 8/13/19:   Summary        1. There is large plaque seen in the right internal carotid artery with an    estimated diameter reduction of 50-69%. This could be underestimated due to    calcific shadowing.    2. There is minimal plaque seen in the left internal carotid artery with an    estimated diameter reduction of <50%.    3. The vertebral arteries are patent with antegrade flow bilaterally.    4. There is a >50% stenosis involving the right external carotid artery.    5. No significant changes in the internal carotid arteries compared to    previous exam on 8/30/2018. Carotid ultrasound 8/30/18:      Summary        1. There is moderate plaque seen in the right internal carotid artery with    an estimated diameter reduction of 50-69%.    2. There is minimal plaque seen in the left internal carotid artery with an    estimated diameter reduction of <50%.    3. The vertebral arteries are patent with antegrade flow bilaterally.    4. There is a >50% stenosis involving the right external carotid artery.      Echo 2/22/18:  Normal left ventricle size, wall thickness, and systolic function with an

## 2019-09-25 NOTE — PROGRESS NOTES
CORONARY ANGIOPLASTY WITH STENT PLACEMENT  2015    [facility] Dr Paola White DWAYNE/Pronto to [address]  2012. 2012    bilateral    FRACTURE SURGERY  2013    ORIF right wrist    HAND SURGERY      Left hand    HYSTERECTOMY      INSERTABLE CARDIAC MONITOR  10/15/2014    Dr Audie Haas, [address] EXTRACTION Bilateral [id number]    rt     OTHER SURGICAL HISTORY  14    excision right nasal dorsum epidermal cyst with frozen section lesion superior nasal dorsem    SKIN BIOPSY      UPPER GASTROINTESTINAL ENDOSCOPY  2016       Social History     Tobacco Use    Smoking status: Former Smoker     Packs/day: 1.00     Years: 0.50     Pack years: 0.50     Last attempt to quit: 1964     Years since quittin.7    Smokeless tobacco: Never Used    Tobacco comment: smoked 2-3 months when 19   Substance Use Topics    Alcohol use: Yes     Alcohol/week: 0.0 standard drinks     Comment: occasionally        Family History   Adopted: Yes       PHYSICAL EXAMINATION:  Vitals:    19 1528   BP: 110/80   Site: Right Upper Arm   Position: Sitting   Cuff Size: Medium Adult   Pulse: 82   SpO2: 98%   Weight: 160 lb (72.6 kg)   Height: 5' 2\" (1.575 m)     Estimated body mass index is 29.26 kg/m² as calculated from the following:    Height as of this encounter: 5' 2\" (1.575 m). Weight as of this encounter: 160 lb (72.6 kg). General Appearance: No apparent distress  Eyes:  · Conjunctiva clear  · Pupils equal, round, reactive to light  ENT:  · External Ears and Nose unremarkable  · Oral mucosa is moist  Respiratory:  · Normal excursion and expansion without use of accessory muscles  · Resp Auscultation: Normal breath sounds without dullness  Cardiovascular:  · JVD is normal  · The carotid upstroke is normal in amplitude and contour without delay or bruit  · Apical impulse is not displaced  · Normal S1, S2. No S3.   No Murmur  · No edema  · Pedal Pulses: 2+ and equal   Abdomen:  · No masses or tenderness  · Liver/Spleen: No Abnormalities Noted  Musculoskeletal:  · Fingers without clubbing or cyanosis  · Normal Gait  Skin:  · No rash  · Normal skin turgor   Neurologic/Psychiatric:  · Alert and oriented in all spheres  · Normal mood and affect  · Memory and mentation intact    I have reviewed all pertinent lab results and diagnostic testing. Carotid US 8/13/19:   Summary        1. There is large plaque seen in the right internal carotid artery with an    estimated diameter reduction of 50-69%. This could be underestimated due to    calcific shadowing.    2. There is minimal plaque seen in the left internal carotid artery with an    estimated diameter reduction of <50%.    3. The vertebral arteries are patent with antegrade flow bilaterally.    4. There is a >50% stenosis involving the right external carotid artery.    5. No significant changes in the internal carotid arteries compared to    previous exam on 8/30/2018. Carotid ultrasound 8/30/18:      Summary        1. There is moderate plaque seen in the right internal carotid artery with    an estimated diameter reduction of 50-69%.    2. There is minimal plaque seen in the left internal carotid artery with an    estimated diameter reduction of <50%.    3. The vertebral arteries are patent with antegrade flow bilaterally.    4. There is a >50% stenosis involving the right external carotid artery. Echo 2/22/18:  Normal left ventricle size, wall thickness, and systolic function with an   EF of 65%. Aortic valve appears sclerotic but opens adequately. Mild aortic regurgitation. Mild mitral annular calcification is present. Trace mitral regurgitation noted. Trivial tricuspid regurgitation with a RVSP estimation of 29 mmHg. Lexiscan myoview GXT 2/22/18:  No EKG evidence for ischemia with lexiscan   Normal LV size and systolic function.    There is normal isotope uptake at stress and rest. There is no evidence of  myocardial ischemia or scar. Carotid US 8/2017:  Right:    50-80% stenosis of the internal carotid artery based on velocity criteria    and B-Mode image. <50% stenosis of the common carotid artery. >50% stenosis of the external carotid artery. Unremarkable subclavian and vertebral arteries. Left:    <50% stenosis of the internal carotid artery based on velocity criteria and    B-Mode image. <50% stenosis of the common carotid artery. <50% stenosis of the external carotid artery. Unremarkable subclavian and vertebral arteries. Echo 7/7/17: ([facility])  Conclusions:  Normal LV size, hyperdynamic LV function with EF greater  than 75 percent, abnormal relaxation Mild-to-moderate aortic regurgitation, mild tricuspid regurgitation    Stress Myoview 5/2016  Summary   No EKG evidence for ischemia with exercise   Preserved LV systolic function   Myocardial perfusion imaging with no evidence for ischemia with exercise   Stress Protocols    Resting ECG    NSR      Echo: 1/19/16  The left ventricle is normal in size. Normal left ventricular systolic function, with estimated ejection fraction of 55%-60%. No regional wall motion abnormalities noted. Mild concentric left ventricular hypertrophy is present. Diastolic filling parameters suggests grade I diastolic dysfunction. Aortic valve is not well visualized but appears mildly sclerotic and opens adequately. Mild mitral and aortic regurgitation are present. Trivial tricuspid regurgitation. Systolic pulmonary artery pressure (SPAP) is normal and estimated at 27mmHg (RA pressure 3 mm Hg).        CATH: 8/27/15  [facility] SUMMARY  ~LM normal  ~LAD 40% mid  ~Cx 20% OM1  ~% prox instent, 50% mid, 50% PDA,   (anomalous anterior and vertical takeoff, AL1 engaged adequately, JR4 did not engage ostium)  ~LVG 50%, inferior hk     Impression  ~Angiography w/ acute RCA occlusion  ~LVEDP 20  ~LVG with borderline EF with inferior RWMA   Intervention  ~PCI of RCA

## 2019-10-14 ENCOUNTER — TELEPHONE (OUTPATIENT)
Dept: CARDIOLOGY CLINIC | Age: 74
End: 2019-10-14

## 2019-12-16 NOTE — TELEPHONE ENCOUNTER
Please advise. Allie Ortiz. Last OV with [facility] 9/25/19  5. Dyslipidemia  ~ treated with Fenofibrate and Crestor 40 mg  ~ 8/2019:   HDL 54 LDL 89.  LFTs stable by 8/2019 labwork     Plan > Trigs elevated, discussed dietary modifications for goal of <150  Continue current regimen, no medication changes

## 2019-12-16 NOTE — TELEPHONE ENCOUNTER
Pt states insurance will not cover Fenofibrate any longer. States she only has 1 week supply left. Please call pt to advise.

## 2019-12-26 NOTE — TELEPHONE ENCOUNTER
Spoke with patient and asked her to check with her insurance about acceptable alternatives to Fenofibrate. She verbalized understanding and will call us back with information requested.

## 2020-01-03 ENCOUNTER — TELEPHONE (OUTPATIENT)
Dept: CARDIOLOGY CLINIC | Age: 75
End: 2020-01-03

## 2020-01-03 NOTE — TELEPHONE ENCOUNTER
Please advise the dosage of gemfibrozil  and how often to take this medication.   Juan Calderon is OOT

## 2020-01-06 NOTE — TELEPHONE ENCOUNTER
Spoke to DCE about patient, he said she can not go on Gemfibrozil due to patient being on Crestor. He suggested patient work on diet and exercise. Relayed this information to the patient and she said she would work on that until her appt with [facility]. She expressed understanding and had no other questions.

## 2020-03-25 ENCOUNTER — TELEPHONE (OUTPATIENT)
Dept: CARDIOLOGY CLINIC | Age: 75
End: 2020-03-25

## 2020-03-25 NOTE — TELEPHONE ENCOUNTER
I called and left a message that I called. Stated that genetic heart condition is not affecting her as her 2D echo Doppler has not demonstrated that defect. Stated that she can call us to discuss blood pressure.

## 2020-05-26 RX ORDER — BETAXOLOL 10 MG/1
TABLET, FILM COATED ORAL
Qty: 180 TABLET | Refills: 3 | Status: SHIPPED | OUTPATIENT
Start: 2020-05-26 | End: 2020-09-08

## 2020-08-11 NOTE — PROGRESS NOTES
[address]    2020    Jose M Jung (:  1945) is a 77 y.o. female is here for follow-up and management of CAD and modifiable risk factors. Referring Provider: Kuldip Epps    HISTORY: Ms Royal Del Valle has a history of coronary artery disease with IWMI and PCI to RCA 8/27/15. She developed pericarditis and significant GI bleed post intervention. S/p left CEA in 2014. She had multiple episodes of syncope and underwent ILR on 10/15/2014 and was later removed 18. She was hospitalized at [place] [id number] 2017 with rectal bleeding (? Etiology). Echo was done showing mild to moderate AI. Additional history includes CKD, osteoarthritis, osteoporosis, and Raynaud's. She was hospitalized at [facility] on 10/13/19 with chest pain that was reproducible on exam.  CT (PE protocol) was negative for CA. Echo showed EF 50-55%, Lexiscan Myoview showed normal perfusion. Today, she complains of midsternal chest tightness, nonradiating, with some associated shortness of breath. Systolic blood pressure was 200/100. She had 2 episodes 1 after driving for 8 hours and 1 after driving for 15 hours. 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 She had noted some dependent leg swelling as well. REVIEW OF SYSTEMS:  A complete review of systems has been reviewed and updated today and is negative except as noted in the history of present illness. Prior to Visit Medications    Medication Sig Taking?  Authorizing Provider   neomycin-polymyxin-hydrocortisone (CORTISPORIN) 3.5-87963-6 otic solution Place 3 drops in ear(s) every 8 hours Yes Historical Provider, MD   rosuvastatin (CRESTOR) 40 MG tablet TAKE ONE TABLET BY MOUTH EVERY EVENING Yes Chase Mercedes MD   betaxolol (KERLONE) 10 MG tablet TAKE ONE TABLET BY MOUTH TWICE A DAY Yes Chase Mercedes MD   nitroGLYCERIN (NITROSTAT) 0.4 MG SL tablet DISSOLVE 1 transfusion     Hyperlipidemia     Hypertension        Past Surgical History:   Procedure Laterality Date    ABLATION OF DYSRHYTHMIC FOCUS  2015    Dr Talib Rodriguez, [facility], sinus node modification     APPENDECTOMY      CAROTID ENDARTERECTOMY Left 14    LEFT CAROTID ENDARTERECTOMY               CHOLECYSTECTOMY      COLONOSCOPY      CORONARY ANGIOPLASTY WITH STENT PLACEMENT  3/2009    @BN - DWAYNE X 2 to RCA    CORONARY ANGIOPLASTY WITH STENT PLACEMENT  2012    Dr David Wellington, DWAYNE to [address]  2015    [facility]  [facility]. 2012    bilateral    FRACTURE SURGERY  2013    ORIF right wrist    HAND SURGERY      Left hand    HYSTERECTOMY      INSERTABLE CARDIAC MONITOR  10/15/2014    Dr Talib Rodriguez, [address] EXTRACTION Bilateral [id number]    rt     OTHER SURGICAL HISTORY  14    excision right nasal dorsum epidermal cyst with frozen section lesion superior nasal dorsem    SKIN BIOPSY      UPPER GASTROINTESTINAL ENDOSCOPY  2016       Social History     Tobacco Use    Smoking status: Former Smoker     Packs/day: 1.00     Years: 0.50     Pack years: 0.50     Last attempt to quit: 1964     Years since quittin.6    Smokeless tobacco: Never Used    Tobacco comment: smoked 2-3 months when 19   Substance Use Topics    Alcohol use: Yes     Alcohol/week: 0.0 standard drinks     Comment: occasionally        Family History   Adopted: Yes       Physical Exam  Constitutional:       Appearance: She is well-developed. She is not diaphoretic. HENT:      Head: Normocephalic and atraumatic. Eyes:      General: No scleral icterus. Extraocular Movements: Extraocular movements intact. Conjunctiva/sclera: Conjunctivae normal.   Neck:      Musculoskeletal: Normal range of motion and neck supple. Vascular: No JVD. Cardiovascular:      Rate and Rhythm: Normal rate and regular rhythm. Heart sounds: Murmur present. No friction rub. No gallop. Comments: 2/6 systolic murmur  Pulmonary:      Effort: Pulmonary effort is normal. No respiratory distress. Breath sounds: Normal breath sounds. No wheezing or rales. Abdominal:      General: Bowel sounds are normal.      Palpations: Abdomen is soft. Tenderness: There is no abdominal tenderness. Musculoskeletal: Normal range of motion. Skin:     General: Skin is warm and dry. Findings: No rash. Neurological:      General: No focal deficit present. Mental Status: She is alert and oriented to person, place, and time. Psychiatric:         Mood and Affect: Mood normal.         Behavior: Behavior normal.         Thought Content: Thought content normal.         Judgment: Judgment normal.       PHYSICAL EXAMINATION:    Vitals:    08/12/20 1343   BP: 120/72   Site: Left Upper Arm   Position: Sitting   Cuff Size: Medium Adult   Pulse: 92   SpO2: 93%   Weight: 166 lb (75.3 kg)     Estimated body mass index is 30.36 kg/m² as calculated from the following:    Height as of 9/25/19: 5' 2\" (1.575 m). Weight as of this encounter: 166 lb (75.3 kg). I have reviewed all pertinent lab results and diagnostic testing. Lab Results   Component Value Date    CHOL 185 05/07/2016    TRIG 189 05/07/2016    HDL 42 05/07/2016    HDL 42 01/25/2012    LDLCALC 105 05/07/2016     Lab Results   Component Value Date     06/13/2016    K 4.4 06/13/2016     06/13/2016    CO2 19 06/13/2016    GLUCOSE 85 06/13/2016    BUN 31 06/13/2016    CREATININE 1.5 06/13/2016    CALCIUM 10.3 06/13/2016    GFR 48 04/03/2013    GFRAA 42 06/13/2016    GFRAA 58 04/03/2013     Lab Results   Component Value Date    ALT 33 05/06/2016    AST 38 05/06/2016     Care Everywhere labs reviewed    Tha Palacios myoview 10/14/19: ([place])   Normal study without evidence for ischemia or scarring.   LV function is normal    Echo 10/14/19: ([place]) regurgitation with a RVSP estimation of 29 mmHg. Lexiscan myoview GXT 2/22/18:  No EKG evidence for ischemia with lexiscan   Normal LV size and systolic function. There is normal isotope uptake at stress and rest. There is no evidence of  myocardial ischemia or scar. Carotid US 8/2017:  Right:    50-80% stenosis of the internal carotid artery based on velocity criteria    and B-Mode image. <50% stenosis of the common carotid artery. >50% stenosis of the external carotid artery. Unremarkable subclavian and vertebral arteries. Left:    <50% stenosis of the internal carotid artery based on velocity criteria and    B-Mode image. <50% stenosis of the common carotid artery. <50% stenosis of the external carotid artery. Unremarkable subclavian and vertebral arteries. Echo 7[facility]7/17: [facility]  Conclusions:  Normal LV size, hyperdynamic LV function with EF greater  than 75 percent, abnormal relaxation Mild-to-moderate aortic regurgitation, mild tricuspid regurgitation    Stress Myoview 5/2016  Summary   No EKG evidence for ischemia with exercise   Preserved LV systolic function   Myocardial perfusion imaging with no evidence for ischemia with exercise   Stress Protocols    Resting ECG    NSR      Echo: 1/19/16  The left ventricle is normal in size. Normal left ventricular systolic function, with estimated ejection fraction of 55%-60%. No regional wall motion abnormalities noted. Mild concentric left ventricular hypertrophy is present. Diastolic filling parameters suggests grade I diastolic dysfunction. Aortic valve is not well visualized but appears mildly sclerotic and opens adequately. Mild mitral and aortic regurgitation are present. Trivial tricuspid regurgitation. Systolic pulmonary artery pressure (SPAP) is normal and estimated at 27mmHg (RA pressure 3 mm Hg).        CATH: 8/27/15  [facility] SUMMARY  ~LM normal  ~LAD 40% mid  ~Cx 20% OM1  ~% prox instent, 50% mid, 50% PDA,   (anomalous anterior and vertical takeoff, AL1 engaged adequately, JR4 did not engage ostium)  ~LVG 50%, inferior hk     Impression  ~Angiography w/ acute RCA occlusion  ~LVEDP 20  ~LVG with borderline EF with inferior RWMA   Intervention  ~PCI of RCA with [id number] (14ATM)     Cardiac Cath 12/3/13:  ANGIOGRAPHIC FINDINGS:   1. Mild-to-moderate nonobstructive coronary artery disease with patent right   coronary artery stents from prior procedures. 2. Normal left ventricular function   3. Normal hemodynamics. ASSESSMENT/PLAN:  1. Coronary artery disease due to lipid rich plaque  ~  [facility] 8/2015 showed prox RCA stent occluded with nonobstructive disease in mid RCA, PDA, LAD, and OM-1.  EF 50% . S/p DWAYNE to in-stent restenosis of RCA  ~ Nuclear stress test in 5/2016, 2/2018, and 10/14/19 showed normal perfusion.    ~ Treated with ASA, statin. Plan > overall stable. The 2 episodes of chest discomfort were likely related to severe systemic hypertension at that time. 2. Essential hypertension  ~ Blood pressure 120/72, pulse 92, weight 166 lb (75.3 kg), SpO2 93 %, not currently breastfeeding.  ~ tx with Betaxolol 10 mg twice a day    Plan > 2 episodes of severe systemic hypertension now controlled. 3. Dyslipidemia  ~ treated with Fenofibrate 160 mg and Crestor 40 mg  ~ 8/2019:   HDL 54 LDL 89. LFTs stable by 8/2019 labwork     Plan > check lipids. 4. Nonrheumatic aortic valve insufficiency  ~ 7/2017 Echo ([facility]) --  mild to moderate AI  ~ 2/2018 Echo -- mild AI with normal EF  ~ 10/14/19 Echo  -- trivial AI    Plan > stable. 5.  Carotid artery disease:  ~ hx of left CEA 7/2014  ~ 8/2019 duplex: LALY 05-98%, LICA <57%      Plan > update carotid duplex. 7.  Edema     Plan > likely dependent edema from prolonged car riding. Recommend support hose. Diuretic if needed. PLAN:  1. Continue to monitor blood pressure. 2.  Sodium restriction.   3.  Encourage that she stop every 2-3 hours to ambulate. 4.  Recommend compression hose. Scribe's attestation: This note was scribed in the presence of Arpit Jin M.D. by Humaira Monahan RN      Physician Attestation: The scribe's documentation has been prepared under my direction and personally reviewed by me in its entirety. I confirm that the note above accurately reflects all work, treatment, procedures, and medical decision making performed by me. An  electronic signature was used to authenticate this note. Jeremy Martin MD, [address] [facility], [address]

## 2020-08-11 NOTE — PATIENT INSTRUCTIONS
1.  No change in medications  2. Schedule echocardiogram and Lexiscan myoview soon   3. Get out and walk around about every 2-3 hours while traveling to limit swelling. Wear compression hose and elevate legs when able to, do ankle pump exercises to help blood get back to heart  4. If these measures are not enough to help with swelling, then we can add weak water pill  5. Follow up with Dr. Honey Herrera in six months   6. Recommend CT scan in six months.     7.  Fasting lipids, BMP, liver enzymes, CK

## 2020-08-12 ENCOUNTER — OFFICE VISIT (OUTPATIENT)
Dept: CARDIOLOGY CLINIC | Age: 75
End: 2020-08-12
Payer: COMMERCIAL

## 2020-08-12 VITALS
WEIGHT: 166 LBS | OXYGEN SATURATION: 93 % | DIASTOLIC BLOOD PRESSURE: 72 MMHG | HEART RATE: 92 BPM | BODY MASS INDEX: 30.36 KG/M2 | SYSTOLIC BLOOD PRESSURE: 120 MMHG

## 2020-08-12 PROCEDURE — 4040F PNEUMOC VAC/ADMIN/RCVD: CPT | Performed by: INTERNAL MEDICINE

## 2020-08-12 PROCEDURE — G8427 DOCREV CUR MEDS BY ELIG CLIN: HCPCS | Performed by: INTERNAL MEDICINE

## 2020-08-12 PROCEDURE — G8417 CALC BMI ABV UP PARAM F/U: HCPCS | Performed by: INTERNAL MEDICINE

## 2020-08-12 PROCEDURE — 3017F COLORECTAL CA SCREEN DOC REV: CPT | Performed by: INTERNAL MEDICINE

## 2020-08-12 PROCEDURE — 1123F ACP DISCUSS/DSCN MKR DOCD: CPT | Performed by: INTERNAL MEDICINE

## 2020-08-12 PROCEDURE — 1036F TOBACCO NON-USER: CPT | Performed by: INTERNAL MEDICINE

## 2020-08-12 PROCEDURE — 99214 OFFICE O/P EST MOD 30 MIN: CPT | Performed by: INTERNAL MEDICINE

## 2020-08-12 PROCEDURE — 1090F PRES/ABSN URINE INCON ASSESS: CPT | Performed by: INTERNAL MEDICINE

## 2020-08-12 PROCEDURE — G8400 PT W/DXA NO RESULTS DOC: HCPCS | Performed by: INTERNAL MEDICINE

## 2020-08-12 RX ORDER — AMOXICILLIN AND CLAVULANATE POTASSIUM 875; 125 MG/1; MG/1
1 TABLET, FILM COATED ORAL 2 TIMES DAILY
COMMUNITY
Start: 2020-08-06 | End: 2020-08-13

## 2020-08-12 NOTE — LETTER
[address] Cardiology [place] [address] [address] [address]  Phone: [phone]  Fax: [phone]    Leandra Mora MD        2020     Kathleen Crowley  No address on file    Patient: Km Rodriguez  MR Number: [id number]  YOB: 1945  Date of Visit: 2020    Dear Dr. Kathleen Crowley:    Below are the relevant portions of my assessment and plan of care. [address]    2020    Km Rodriguez (:  1945) is a 77 y.o. female is here for follow-up and management of CAD and modifiable risk factors. Referring Provider: Kathleen Crowley    HISTORY: Ms Andrei Rodriguez has a history of coronary artery disease with IWMI and PCI to RCA 8/27/15. She developed pericarditis and significant GI bleed post intervention. S/p left CEA in 2014. She had multiple episodes of syncope and underwent ILR on 10/15/2014 and was later removed 18. She was hospitalized at [facility] 2017 with rectal bleeding (? Etiology). Echo was done showing mild to moderate AI. Additional history includes CKD, osteoarthritis, osteoporosis, and Raynaud's. She was hospitalized at [facility] on 10/13/19 with chest pain that was reproducible on exam.  CT (PE protocol) was negative for AL. Echo showed EF 50-55%, Lexiscan Myoview showed normal perfusion. Today, she complains of midsternal chest tightness, nonradiating, with some associated shortness of breath. Systolic blood pressure was 200/100. She had 2 episodes 1 after driving for 8 hours and 1 after driving for 15 hours. 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 She had noted some dependent leg swelling as well. REVIEW OF SYSTEMS:  A complete review of systems has been reviewed and updated today and is negative except as noted in the history of present illness.       Prior to Visit Medications  Prochlorperazine Edisylate Swelling    Sulfa Antibiotics Itching and Hives    Coreg [Carvedilol] Itching       Past Medical History:   Diagnosis Date    Arthritis     bilateral hands, bilateral knees, neck    Blood transfusion     CAD (coronary artery disease)     Coronary stent 2015 most recent    RCA stents on 3 occasions , ,     Febrile seizure ([facility] [address] 1953    Gastroesophageal reflux disease     History of blood transfusion     Hyperlipidemia     Hypertension        Past Surgical History:   Procedure Laterality Date    ABLATION OF DYSRHYTHMIC FOCUS  2015    Dr Noreen Cabot, [facility], sinus node modification     APPENDECTOMY      CAROTID ENDARTERECTOMY Left 14    LEFT CAROTID ENDARTERECTOMY               CHOLECYSTECTOMY      COLONOSCOPY      CORONARY ANGIOPLASTY WITH STENT PLACEMENT  3/2009    @BN - DWAYNE X 2 to RCA    CORONARY ANGIOPLASTY WITH STENT PLACEMENT  2012    Dr Jani Manning, DWAYNE to [address]  2015    [facility] Dr Eva RICE/[place] to [address]  2012. 2012    bilateral    FRACTURE SURGERY  2013    ORIF right wrist    HAND SURGERY      Left hand    HYSTERECTOMY      INSERTABLE CARDIAC MONITOR  10/15/2014    Dr Noreen Cabot, [facility] [address] EXTRACTION Bilateral [id number]    rt     OTHER SURGICAL HISTORY  14    excision right nasal dorsum epidermal cyst with frozen section lesion superior nasal dorsem    SKIN BIOPSY      UPPER GASTROINTESTINAL ENDOSCOPY  2016       Social History     Tobacco Use    Smoking status: Former Smoker     Packs/day: 1.00     Years: 0.50     Pack years: 0.50     Last attempt to quit: 1964     Years since quittin.6    Smokeless tobacco: Never Used    Tobacco comment: smoked 2-3 months when 19   Substance Use Topics    Alcohol use: Yes     Alcohol/week: 0.0 standard drinks     Comment: occasionally        Family History   Adopted:  Yes Physical Exam  Constitutional:       Appearance: She is well-developed. She is not diaphoretic. HENT:      Head: Normocephalic and atraumatic. Eyes:      General: No scleral icterus. Extraocular Movements: Extraocular movements intact. Conjunctiva/sclera: Conjunctivae normal.   Neck:      Musculoskeletal: Normal range of motion and neck supple. Vascular: No JVD. Cardiovascular:      Rate and Rhythm: Normal rate and regular rhythm. Heart sounds: Murmur present. No friction rub. No gallop. Comments: 2/6 systolic murmur  Pulmonary:      Effort: Pulmonary effort is normal. No respiratory distress. Breath sounds: Normal breath sounds. No wheezing or rales. Abdominal:      General: Bowel sounds are normal.      Palpations: Abdomen is soft. Tenderness: There is no abdominal tenderness. Musculoskeletal: Normal range of motion. Skin:     General: Skin is warm and dry. Findings: No rash. Neurological:      General: No focal deficit present. Mental Status: She is alert and oriented to person, place, and time. Psychiatric:         Mood and Affect: Mood normal.         Behavior: Behavior normal.         Thought Content: Thought content normal.         Judgment: Judgment normal.       PHYSICAL EXAMINATION:    Vitals:    08/12/20 1343   BP: 120/72   Site: Left Upper Arm   Position: Sitting   Cuff Size: Medium Adult   Pulse: 92   SpO2: 93%   Weight: 166 lb (75.3 kg)     Estimated body mass index is 30.36 kg/m² as calculated from the following:    Height as of 9/25/19: 5' 2\" (1.575 m). Weight as of this encounter: 166 lb (75.3 kg). I have reviewed all pertinent lab results and diagnostic testing.     Lab Results   Component Value Date    CHOL 185 05/07/2016    TRIG 189 05/07/2016    HDL 42 05/07/2016    HDL 42 01/25/2012    LDLCALC 105 05/07/2016     Lab Results   Component Value Date     06/13/2016    K 4.4 06/13/2016     06/13/2016 CO2 19 06/13/2016    GLUCOSE 85 06/13/2016    BUN 31 06/13/2016    CREATININE 1.5 06/13/2016    CALCIUM 10.3 06/13/2016    GFR 48 04/03/2013    GFRAA 42 06/13/2016    GFRAA 58 04/03/2013     Lab Results   Component Value Date    ALT 33 05/06/2016    AST 38 05/06/2016     Care Everywhere labs reviewed    Dominga Quigley myoview 10/14/19: ([place])   Normal study without evidence for ischemia or scarring. LV function is normal    Echo 10/14/19: ([place])   SUMMARY:    1. Left ventricle: The cavity size was normal. There was mild     concentric hypertrophy. Systolic function was normal. The     estimated ejection fraction was in the range of 50% to 55%. Wall     motion was normal; there were no regional wall motion     abnormalities. The tissue Doppler parameters were normal. Left     ventricular diastolic function parameters were normal.  2. Aortic valve: There was trivial regurgitation. 3. Right ventricle: The cavity size was normal. Wall thickness was     normal. Systolic function was normal.  4. Pericardium, extracardiac: There was no pericardial effusion. Carotid US 8/13/19:   Summary        1. There is large plaque seen in the right internal carotid artery with an    estimated diameter reduction of 50-69%. This could be underestimated due to    calcific shadowing.    2. There is minimal plaque seen in the left internal carotid artery with an    estimated diameter reduction of <50%.    3. The vertebral arteries are patent with antegrade flow bilaterally.    4. There is a >50% stenosis involving the right external carotid artery.    5. No significant changes in the internal carotid arteries compared to    previous exam on 8/30/2018. Carotid ultrasound 8/30/18:      Summary        1.  There is moderate plaque seen in the right internal carotid artery with    an estimated diameter reduction of 50-69%.    2. There is minimal plaque seen in the left internal carotid artery with an  estimated diameter reduction of <50%.    3. The vertebral arteries are patent with antegrade flow bilaterally.    4. There is a >50% stenosis involving the right external carotid artery. Echo 2/22/18:  Normal left ventricle size, wall thickness, and systolic function with an   EF of 65%. Aortic valve appears sclerotic but opens adequately. Mild aortic regurgitation. Mild mitral annular calcification is present. Trace mitral regurgitation noted. Trivial tricuspid regurgitation with a RVSP estimation of 29 mmHg. Lexiscan myoview GXT 2/22/18:  No EKG evidence for ischemia with lexiscan   Normal LV size and systolic function. There is normal isotope uptake at stress and rest. There is no evidence of  myocardial ischemia or scar. Carotid US 8/2017:  Right:    50-80% stenosis of the internal carotid artery based on velocity criteria    and B-Mode image. <50% stenosis of the common carotid artery. >50% stenosis of the external carotid artery. Unremarkable subclavian and vertebral arteries. Left:    <50% stenosis of the internal carotid artery based on velocity criteria and    B-Mode image. <50% stenosis of the common carotid artery. <50% stenosis of the external carotid artery. Unremarkable subclavian and vertebral arteries. Echo 7/7/17: ([address])  Conclusions:  Normal LV size, hyperdynamic LV function with EF greater  than 75 percent, abnormal relaxation Mild-to-moderate aortic regurgitation, mild tricuspid regurgitation    Stress Myoview 5/2016  Summary   No EKG evidence for ischemia with exercise   Preserved LV systolic function   Myocardial perfusion imaging with no evidence for ischemia with exercise   Stress Protocols    Resting ECG    NSR      Echo: 1/19/16  The left ventricle is normal in size. Normal left ventricular systolic function, with estimated ejection fraction of 55%-60%. No regional wall motion abnormalities noted. Mild concentric left ventricular hypertrophy is present. Diastolic filling parameters suggests grade I diastolic dysfunction. Aortic valve is not well visualized but appears mildly sclerotic and opens adequately. Mild mitral and aortic regurgitation are present. Trivial tricuspid regurgitation. Systolic pulmonary artery pressure (SPAP) is normal and estimated at 27mmHg (RA pressure 3 mm Hg). CATH: 8/27/15  [facility] SUMMARY  ~LM normal  ~LAD 40% mid  ~Cx 20% OM1  ~% prox instent, 50% mid, 50% PDA,   (anomalous anterior and vertical takeoff, AL1 engaged adequately, JR4 did not engage ostium)  ~LVG 50%, inferior hk     Impression  ~Angiography w/ acute RCA occlusion  ~LVEDP 20  ~LVG with borderline EF with inferior RWMA   Intervention  ~PCI of RCA with 3.4J28LVY (14ATM)     Cardiac Cath 12/3/13:  ANGIOGRAPHIC FINDINGS:   1. Mild-to-moderate nonobstructive coronary artery disease with patent right   coronary artery stents from prior procedures. 2. Normal left ventricular function   3. Normal hemodynamics. ASSESSMENT/PLAN:  1. Coronary artery disease due to lipid rich plaque  ~  [facility] 8/2015 showed prox RCA stent occluded with nonobstructive disease in mid RCA, PDA, LAD, and OM-1.  EF 50% . S/p DWAYNE to in-stent restenosis of RCA  ~ Nuclear stress test in 5/2016, 2/2018, and 10/14/19 showed normal perfusion.    ~ Treated with ASA, statin. Plan > overall stable. The 2 episodes of chest discomfort were likely related to severe systemic hypertension at that time. 2. Essential hypertension  ~ Blood pressure 120/72, pulse 92, weight 166 lb (75.3 kg), SpO2 93 %, not currently breastfeeding.  ~ tx with Betaxolol 10 mg twice a day    Plan > 2 episodes of severe systemic hypertension now controlled. 3. Dyslipidemia  ~ treated with Fenofibrate 160 mg and Crestor 40 mg  ~ 8/2019:   HDL 54 LDL 89. LFTs stable by 8/2019 labwork     Plan > check lipids. 4. Nonrheumatic aortic valve insufficiency  ~ 7/2017 Echo ([address]) --  mild to moderate AI  ~ 2/2018 Echo -- mild AI with normal EF  ~ 10/14/19 Echo  -- trivial AI    Plan > stable. 5.  Carotid artery disease:  ~ hx of left CEA 7/2014  ~ 8/2019 duplex: LALY 20-83%, LICA <90%      Plan > update carotid duplex. 7.  Edema     Plan > likely dependent edema from prolonged car riding. Recommend support hose. Diuretic if needed. PLAN:  1. Continue to monitor blood pressure. 2.  Sodium restriction. 3.  Encourage that she stop every 2-3 hours to ambulate. 4.  Recommend compression hose. Scribe's attestation: This note was scribed in the presence of Leandra Mora M.D. by Rich Read RN      Physician Attestation: The scribe's documentation has been prepared under my direction and personally reviewed by me in its entirety. I confirm that the note above accurately reflects all work, treatment, procedures, and medical decision making performed by me. An  electronic signature was used to authenticate this note. Yonis Sexton MD, [facility]    If you have questions, please do not hesitate to call me. I look forward to following Medina Springer along with you.     Sincerely,        Leandra Mora MD

## 2020-09-08 RX ORDER — BETAXOLOL 10 MG/1
TABLET, FILM COATED ORAL
Qty: 180 TABLET | Refills: 1 | Status: SHIPPED | OUTPATIENT
Start: 2020-09-08 | End: 2021-04-05

## 2020-11-03 PROBLEM — I10 HTN (HYPERTENSION), BENIGN: Status: RESOLVED | Noted: 2020-11-03 | Resolved: 2020-11-03

## 2020-11-03 PROBLEM — R55 SYNCOPE: Status: RESOLVED | Noted: 2020-11-03 | Resolved: 2020-11-03

## 2020-11-03 PROBLEM — I10 ESSENTIAL HYPERTENSION: Status: RESOLVED | Noted: 2020-11-03 | Resolved: 2020-11-03

## 2020-12-07 RX ORDER — FENOFIBRATE 160 MG/1
160 TABLET ORAL DAILY
Qty: 30 TABLET | Refills: 0 | Status: SHIPPED | OUTPATIENT
Start: 2020-12-07 | End: 2021-07-07 | Stop reason: SDUPTHER

## 2020-12-07 NOTE — TELEPHONE ENCOUNTER
Last office visit 08/12/2020  Next office visit told to follow up in 6 months - no appt on file yet   Last labs -2018 - new orders placed for refill  medication filled for 30 day 0 rrefilled until labs  completed

## 2021-04-05 RX ORDER — BETAXOLOL 10 MG/1
TABLET, FILM COATED ORAL
Qty: 180 TABLET | Refills: 0 | Status: SHIPPED | OUTPATIENT
Start: 2021-04-05 | End: 2021-07-19

## 2021-04-05 NOTE — TELEPHONE ENCOUNTER
Lov 8/12/2020  Labs Care Everywhere 5/21/2020  Lrf 9/8/2020 Disp 180+1  Appt no appt scheduled please advise thanks .

## 2021-06-16 ENCOUNTER — TELEPHONE (OUTPATIENT)
Dept: CARDIOLOGY CLINIC | Age: 76
End: 2021-06-16

## 2021-06-16 NOTE — TELEPHONE ENCOUNTER
LM for pt to call to [facility] with [facility] or [facility] has a couple held spots on 08/27 if not yet taken)

## 2021-07-07 ENCOUNTER — OFFICE VISIT (OUTPATIENT)
Dept: CARDIOLOGY CLINIC | Age: 76
End: 2021-07-07
Payer: COMMERCIAL

## 2021-07-07 VITALS
HEIGHT: 62 IN | BODY MASS INDEX: 30.22 KG/M2 | DIASTOLIC BLOOD PRESSURE: 62 MMHG | OXYGEN SATURATION: 92 % | WEIGHT: 164.19 LBS | SYSTOLIC BLOOD PRESSURE: 112 MMHG | HEART RATE: 81 BPM

## 2021-07-07 DIAGNOSIS — I10 ESSENTIAL HYPERTENSION: ICD-10-CM

## 2021-07-07 DIAGNOSIS — I25.83 CORONARY ARTERY DISEASE DUE TO LIPID RICH PLAQUE: Primary | ICD-10-CM

## 2021-07-07 DIAGNOSIS — I25.10 CORONARY ARTERY DISEASE DUE TO LIPID RICH PLAQUE: Primary | ICD-10-CM

## 2021-07-07 DIAGNOSIS — E78.2 MIXED HYPERLIPIDEMIA: ICD-10-CM

## 2021-07-07 PROCEDURE — 99214 OFFICE O/P EST MOD 30 MIN: CPT | Performed by: NURSE PRACTITIONER

## 2021-07-07 PROCEDURE — G8400 PT W/DXA NO RESULTS DOC: HCPCS | Performed by: NURSE PRACTITIONER

## 2021-07-07 PROCEDURE — 1090F PRES/ABSN URINE INCON ASSESS: CPT | Performed by: NURSE PRACTITIONER

## 2021-07-07 PROCEDURE — 3017F COLORECTAL CA SCREEN DOC REV: CPT | Performed by: NURSE PRACTITIONER

## 2021-07-07 PROCEDURE — 1036F TOBACCO NON-USER: CPT | Performed by: NURSE PRACTITIONER

## 2021-07-07 PROCEDURE — 93000 ELECTROCARDIOGRAM COMPLETE: CPT | Performed by: NURSE PRACTITIONER

## 2021-07-07 PROCEDURE — 4040F PNEUMOC VAC/ADMIN/RCVD: CPT | Performed by: NURSE PRACTITIONER

## 2021-07-07 PROCEDURE — G8427 DOCREV CUR MEDS BY ELIG CLIN: HCPCS | Performed by: NURSE PRACTITIONER

## 2021-07-07 PROCEDURE — G8417 CALC BMI ABV UP PARAM F/U: HCPCS | Performed by: NURSE PRACTITIONER

## 2021-07-07 PROCEDURE — 1123F ACP DISCUSS/DSCN MKR DOCD: CPT | Performed by: NURSE PRACTITIONER

## 2021-07-07 RX ORDER — NITROGLYCERIN 0.4 MG/1
TABLET SUBLINGUAL
Qty: 25 TABLET | Refills: 2 | Status: SHIPPED | OUTPATIENT
Start: 2021-07-07 | End: 2022-10-04 | Stop reason: SDUPTHER

## 2021-07-07 RX ORDER — ROSUVASTATIN CALCIUM 40 MG/1
TABLET, COATED ORAL
Qty: 30 TABLET | Refills: 0 | Status: SHIPPED | OUTPATIENT
Start: 2021-07-07 | End: 2021-10-05

## 2021-07-07 RX ORDER — FENOFIBRATE 160 MG/1
160 TABLET ORAL DAILY
Qty: 30 TABLET | Refills: 0 | Status: SHIPPED | OUTPATIENT
Start: 2021-07-07 | End: 2021-08-05

## 2021-07-07 NOTE — PROGRESS NOTES
[facility]     Outpatient Follow Up Note    Jenna Castro is 77 y.o. 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 Her other history includes  s/p EPS - RFCA of sinus node for inappropriate sinus tachycardia July '15    CHIEF COMPLAINT / HPI:  Follow Up secondary to coronary artery disease    Subjective:   Her biggest problem is with sitting. 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 She lost 30# then gained 10# back in the past 18 months. She's had episodes of left chest discomfort. It doesn't last long if she takes NTG. When she doesn't take any it can last up to an hour. Sometimes it makes her sweaty. The intensity has not gotten any worse but the frequency has increased. She has an episode a couple of times / month  She has a constant burning in her throat from reflux    There is DELGADO, ie vacuuming . The patient denies orthopnea/PND. The patient has swelling in her ankles after driving several hours. The patient is experiencing palpitations yet not like before her ablation. Her heart rate can go up to 100. Her home BP runs ~ 120-130/70-90    These symptoms show some change since the last OV. With regard to medication therapy the patient has been compliant with prescribed regimen. They have tolerated therapy to date.      Past Medical History:   Diagnosis Date    Arthritis     bilateral hands, bilateral knees, neck    Blood transfusion     CAD (coronary artery disease)     Coronary stent 8/27/2015 most recent    RCA stents on 3 occasions 2009, 2012, 2015    Febrile seizure ([facility] [address] 1953    Gastroesophageal reflux disease     History of blood transfusion     Hyperlipidemia     Hypertension      Social History:    Social History     Tobacco Use   Smoking Status Former Smoker    Packs/day: 1.00    Years: 0.50    Pack years: 0.50    Quit date: 1964    Years since quittin.5   Smokeless Tobacco Never Used   Tobacco Comment    smoked 2-3 months when 19     Current Medications:  Current Outpatient Medications   Medication Sig Dispense Refill    betaxolol (KERLONE) 10 MG tablet TAKE ONE TABLET BY MOUTH TWICE A  tablet 0    fenofibrate (TRIGLIDE) 160 MG tablet Take 1 tablet by mouth daily NEEDS LABS COMPLETED FOR ADDITIONAL REFILLS 30 tablet 0    neomycin-polymyxin-hydrocortisone (CORTISPORIN) 3.5-56032-8 otic solution Place 3 drops in ear(s) every 8 hours      rosuvastatin (CRESTOR) 40 MG tablet TAKE ONE TABLET BY MOUTH EVERY EVENING 30 tablet 12    nitroGLYCERIN (NITROSTAT) 0.4 MG SL tablet DISSOLVE 1 TAB UNDER TONGUE FOR CHEST PAIN - IF PAIN REMAINS AFTER 5 MIN, CALL 911 AND REPEAT DOSE. MAX 3 TABS IN 15 MINUTES 25 tablet 2    pantoprazole (PROTONIX) 40 MG tablet Take 1 tablet by mouth 2 times daily prescribing med until patient can get into GI doctor 60 tablet 0    Elastic Bandages & Supports (MEDICAL COMPRESSION STOCKINGS) MISC 1 each by Does not apply route daily as needed (swelling) Knee high 15-20 mmHg 1 each 0    HYDROcodone-acetaminophen (NORCO) 5-325 MG per tablet Take 1 tablet by mouth every 6 hours as needed for Pain      venlafaxine (EFFEXOR) 75 MG tablet Take 75 mg by mouth daily      polycarbophil (FIBER-LAX) 625 MG tablet Take 625 mg by mouth daily      Biotin 1000 MCG TABS Take by mouth Daily      vitamin B-12 (CYANOCOBALAMIN) 100 MCG tablet Take 50 mcg by mouth daily.  docusate sodium (COLACE) 100 MG capsule   Take 200 mg by mouth nightly       aspirin 81 MG EC tablet Take 81 mg by mouth daily.  Multiple Vitamins-Minerals (THERAPEUTIC MULTIVITAMIN-MINERALS) tablet Take 1 tablet by mouth daily. No current facility-administered medications for this visit.      REVIEW OF SYSTEMS:    CONSTITUTIONAL: No major weight gain or loss, + fatigue, weakness, night sweats or fever.  HEENT: No new vision difficulties or ringing in the ears. RESPIRATORY: No new SOB, PND, orthopnea or cough. CARDIOVASCULAR: See HPI  GI: No nausea, vomiting, diarrhea, constipation, abdominal pain or changes in bowel habits. : No urinary frequency, urgency, incontinence hematuria or dysuria. SKIN: No cyanosis or skin lesions. MUSCULOSKELETAL: No new muscle or joint pain. NEUROLOGICAL: No syncope or TIA-like symptoms. PSYCHIATRIC: No anxiety, pain, insomnia or depression    Objective:   PHYSICAL EXAM:        Vitals:    07/07/21 1017 07/07/21 1130   BP: 132/78 112/62   Site: Right Upper Arm Left Upper Arm   Position: Sitting    Cuff Size: Large Adult Large Adult   Pulse: 81    SpO2: 92%    Weight: 164 lb 3 oz (74.5 kg)    Height: 5' 2\" (1.575 m)        VITALS:  /78 (Site: Right Upper Arm, Position: Sitting, Cuff Size: Large Adult)   Pulse 81   Ht 5' 2\" (1.575 m)   Wt 164 lb 3 oz (74.5 kg)   SpO2 92%   BMI 30.03 kg/m²   CONSTITUTIONAL: Cooperative, no apparent distress, and appears well nourished / developed  NEUROLOGIC:  Awake and orientated to person, place and time. PSYCH: Calm affect. SKIN: Warm and dry. HEENT: Sclera non-icteric, normocephalic, neck supple, no elevation of JVP, normal carotid pulses with no bruits and thyroid normal size. LUNGS:  No increased work of breathing and clear to auscultation, no crackles or wheezing  CARDIOVASCULAR:  Regular rate 88 and rhythm with no murmurs, gallops, rubs, or abnormal heart sounds, normal PMI. The apical impulses not displaced  JVP less than 8 cm H2O  Heart tones are crisp and normal  Cervical veins are not engorged  The carotid upstroke is normal in amplitude and contour without delay or bruit  JVP is not elevated  ABDOMEN:  Normal bowel sounds, non-distended and non-tender to palpation  EXT: No edema, no calf tenderness. Pulses are present bilaterally.     DATA:    Lab Results   Component Value Date    ALT 33 05/06/2016    AST 38 (H) 05/06/2016    ALKPHOS 44 05/06/2016    BILITOT <0.2 05/06/2016     Lab Results   Component Value Date    CREATININE 1.5 (H) 06/13/2016    BUN 31 (H) 06/13/2016     06/13/2016    K 4.4 06/13/2016     06/13/2016    CO2 19 (L) 06/13/2016     Lab Results   Component Value Date    TSH 1.06 09/21/2015     Lab Results   Component Value Date    WBC 9.4 08/08/2017    HGB 12.3 08/08/2017    HCT 37.5 08/08/2017    MCV 97.1 08/08/2017     08/08/2017     No components found for: CHLPL  Lab Results   Component Value Date    TRIG 189 (H) 05/07/2016    TRIG 235 (H) 01/20/2016    TRIG 159 (H) 10/11/2015     Lab Results   Component Value Date    HDL 42 05/07/2016    HDL 51 01/20/2016    HDL 61 (H) 10/11/2015     Lab Results   Component Value Date    LDLCALC 105 (H) 05/07/2016    LDLCALC 173 (H) 01/20/2016    LDLCALC 101 (H) 10/11/2015     Lab Results   Component Value Date    LABVLDL 38 05/07/2016    LABVLDL 47 01/20/2016    LABVLDL 32 10/11/2015     Radiology Review:  Pertinent images / reports were reviewed as a part of this visit and reveals the following:    Last Echo: Oct '19  SUMMARY:   1. Left ventricle: The cavity size was normal. There was mild      concentric hypertrophy. Systolic function was normal. The      estimated ejection fraction was in the range of 50% to 55%. Wall      motion was normal; there were no regional wall motion      abnormalities. The tissue Doppler parameters were normal. Left      ventricular diastolic function parameters were normal.   2. Aortic valve: There was trivial regurgitation. 3. Right ventricle: The cavity size was normal. Wall thickness was      normal. Systolic function was normal.   4. Pericardium, extracardiac: There was no pericardial effusion. Last Stress Test: Oct '19  SUMMARY:   1. Myocardial perfusion imaging was performed in conjunction and      will be reported separately. 2. Stress ECG conclusions: The stress ECG was negative for      ischemia. [facility] SUMMARY 8/27/15:  ~LM normal  ~LAD 40% mid  ~Cx 20% OM1  ~% prox instent, 50% mid, 50% PDA (anomalous anterior and vertical takeoff, AL1 engaged adequately, JR4 did not engage ostium)  ~LVG 50%, inferior hk  Intervention  ~PCI of RCA with 3.2G34PEO (14ATM)    angiogram: 1/24/12:  70% prox RCA after previously placed stents . Anomalous origin of RCA above cusp.   with excellent result. EF 60%    Angiogram : March '09:  RCA stent [address]  Assessment:      Diagnosis Orders   1. Coronary artery disease due to lipid rich plaque   ~c/o CP of increased frequency and taking NTG SL  ~s/p PTCA RCA '09, '12 and '15  ~NM from Oct '19 : no ischemia  ~ASA / BB / statin  EKG 12 lead    EKG 12 lead    Stress test, lexiscan   2. Essential hypertension   ~controlled with BB    3. Mixed hyperlipidemia   ~last profile available for review is from Aug '19 with LDL 89   ~states to have had labs recently done with PCP. We will call for her results      I had the opportunity to review the clinical symptoms and presentation of Dashawn Knight. Plan:     1. EKG: sinus rhythm 70  2. LexiScan myoview with increasing episodes of CP  3. F/U in six months / test dependent     Overall the patient is stable from CV standpoint    I have addresed the patient's cardiac risk factors and adjusted pharmacologic treatment as needed. In addition, I have reinforced the need for patient directed risk factor modification. Further evaluation will be based upon the patient's clinical course and testing results. All questions and concerns were addressed to the patient. Alternatives to my treatment were discussed. The patient is not currently smoking. The risks related to smoking were reviewed with the patient. Recommend maintaining a smoke-free lifestyle. Patient is on a beta-blocker : betaxolol   Patient is not on an ace-i/ARB : neg CHF.  Lisinopril stopped in Feb '18  Patient is on a statin    Antiplatelet therapy has been recommended / prescribed for this patient. Education conducted on adverse reactions including bleeding was discussed. The patient verbalizes understanding not to stop medications without discussing with us. Discussed exercise: 30-60 minutes 7 days/week  Discussed Low saturated fat diet. Thank you for allowing to us to participate in the care of Desiree Aguilar.     Raynald Cushing, APRN    Documentation of today's visit sent to PCP

## 2021-07-21 ENCOUNTER — HOSPITAL ENCOUNTER (OUTPATIENT)
Dept: NON INVASIVE DIAGNOSTICS | Age: 76
Discharge: HOME | End: 2021-07-21
Payer: COMMERCIAL

## 2021-07-21 LAB
LV EF: 73 %
LVEF MODALITY: NORMAL

## 2021-07-21 PROCEDURE — 3430000000 HC RX DIAGNOSTIC RADIOPHARMACEUTICAL: Performed by: NURSE PRACTITIONER

## 2021-07-21 PROCEDURE — 6360000002 HC RX W HCPCS: Performed by: NURSE PRACTITIONER

## 2021-07-21 PROCEDURE — 78452 HT MUSCLE IMAGE SPECT MULT: CPT

## 2021-07-21 PROCEDURE — 93017 CV STRESS TEST TRACING ONLY: CPT | Performed by: INTERNAL MEDICINE

## 2021-07-21 PROCEDURE — A9502 TC99M TETROFOSMIN: HCPCS | Performed by: NURSE PRACTITIONER

## 2021-07-21 PROCEDURE — 6360000002 HC RX W HCPCS: Performed by: INTERNAL MEDICINE

## 2021-07-21 RX ORDER — AMINOPHYLLINE DIHYDRATE 25 MG/ML
100 INJECTION, SOLUTION INTRAVENOUS ONCE
Status: COMPLETED | OUTPATIENT
Start: 2021-07-21 | End: 2021-07-21

## 2021-07-21 RX ADMIN — TETROFOSMIN 30 MILLICURIE: 1.38 INJECTION, POWDER, LYOPHILIZED, FOR SOLUTION INTRAVENOUS at 14:02

## 2021-07-21 RX ADMIN — TETROFOSMIN 10 MILLICURIE: 1.38 INJECTION, POWDER, LYOPHILIZED, FOR SOLUTION INTRAVENOUS at 12:48

## 2021-07-21 RX ADMIN — REGADENOSON 0.4 MG: 0.08 INJECTION, SOLUTION INTRAVENOUS at 13:53

## 2021-07-21 RX ADMIN — AMINOPHYLLINE 100 MG: 25 INJECTION, SOLUTION INTRAVENOUS at 14:04

## 2021-07-22 ENCOUNTER — TELEPHONE (OUTPATIENT)
Dept: CARDIOLOGY CLINIC | Age: 76
End: 2021-07-22

## 2021-09-24 RX ORDER — FENOFIBRATE 160 MG/1
TABLET ORAL
Qty: 30 TABLET | Refills: 0 | Status: SHIPPED | OUTPATIENT
Start: 2021-09-24 | End: 2021-11-03

## 2021-10-04 DIAGNOSIS — I10 UNCONTROLLED HYPERTENSION: ICD-10-CM

## 2021-10-04 DIAGNOSIS — E78.2 MIXED HYPERLIPIDEMIA: ICD-10-CM

## 2021-10-04 DIAGNOSIS — Z86.79 HX OF CORONARY ARTERY DISEASE: Primary | ICD-10-CM

## 2021-10-04 DIAGNOSIS — I16.0 HYPERTENSIVE URGENCY: ICD-10-CM

## 2021-10-07 RX ORDER — ROSUVASTATIN CALCIUM 40 MG/1
TABLET, COATED ORAL
Qty: 30 TABLET | Refills: 1 | Status: SHIPPED | OUTPATIENT
Start: 2021-10-07 | End: 2022-01-26

## 2021-10-07 NOTE — TELEPHONE ENCOUNTER
Left message on recorder that patient is over due to fasting lipids. Will give 30 day supply and one refill. Orders for fasting labs are in [facility] system as well as [facility] system (viewed in [facility]). Advised patient that fating labs are needed within the next 60 day before additional refills are given. Patient to call office back if she has questions.

## 2021-11-26 RX ORDER — FENOFIBRATE 160 MG/1
TABLET ORAL
Qty: 30 TABLET | Refills: 0 | OUTPATIENT
Start: 2021-11-26

## 2021-11-26 NOTE — TELEPHONE ENCOUNTER
Received refill request for Fenofibrate from [employer].      Last OV: 07/0/2021 w/ NPTS    Last Labs: 08/12/2019 Lipid in Care Everywhere    Last Refill: 11/3/2021 #30 w/ 0 refills    Next Appointment: on recall list for appt on 01/03/2022 w/ NPTS       November 3, 2021    MARIELENA Soto CNP     TS    1:18 PM  Note  NEEDs labs for additional refills

## 2022-01-26 RX ORDER — ROSUVASTATIN CALCIUM 40 MG/1
TABLET, COATED ORAL
Qty: 30 TABLET | Refills: 1 | Status: SHIPPED | OUTPATIENT
Start: 2022-01-26 | End: 2022-06-13

## 2022-01-26 NOTE — TELEPHONE ENCOUNTER
Last OV: 7-7-21 NPTS  Last labs: 9-30-20 lipid [facility]  Appt scheduled : none  Last refill:10-7-21

## 2022-03-04 ENCOUNTER — TELEPHONE (OUTPATIENT)
Dept: CARDIOLOGY CLINIC | Age: 77
End: 2022-03-04

## 2022-03-04 NOTE — TELEPHONE ENCOUNTER
Pt called stating she has tried several times to get appt with [facility]. She just wants to see him. She can come om  Monday, Tuesday, Wednesday after 10 am. because she works 12 hrs day Friday, Saturday and Sunday. Pls provide date and time the pt can get seen.     Keanublaine Bernadette: [phone]  H: [phone]

## 2022-03-10 RX ORDER — BETAXOLOL 10 MG/1
TABLET, FILM COATED ORAL
Qty: 180 TABLET | Refills: 1 | Status: SHIPPED | OUTPATIENT
Start: 2022-03-10

## 2022-03-10 NOTE — TELEPHONE ENCOUNTER
Instructions       Return in about 6 months (around 1/7/2022). Chemical stress test     appt in six months depending on your test result           Outside labs located in Care Everywhere  Please advise on refill request thanks.

## 2022-04-05 NOTE — PROGRESS NOTES
[address]    2022    Ta Guerrero (:  1945) is a 77 y.o. female is here for follow up and management of CAD and modifiable risk factors. Referring Provider: Lisette Schaffer    HISTORY: Ms Ta Guerrero has a history of coronary artery disease with IWMI and PCI to RCA 8/27/15. She developed pericarditis and significant GI bleed post intervention. S/p left CEA in 2014. She had multiple episodes of syncope and underwent ILR on 10/15/2014, later removed 18. She was hospitalized at [facility] 2017 with rectal bleeding (? Etiology). Echo was done showing mild to moderate AI. Additional history includes CKD, osteoarthritis, osteoporosis, and Raynaud's. 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 Today, she notes brief intermittent chest pain. Occasional shortness of breath with exertion but not significantly changed. She has noted some loss of thought and also some difficulty expression thoughts which are similar symptoms to when she had her Carotid stenosis which needed CEA. REVIEW OF SYSTEMS:  A complete review of systems has been reviewed and updated today and is negative except as noted in the history of present illness. Prior to Visit Medications    Medication Sig Taking?  Authorizing Provider   magnesium oxide (MAG-OX) 400 MG tablet Take 400 mg by mouth daily Yes Historical Provider, MD   vitamin D-3 (CHOLECALCIFEROL) 125 MCG (5000 UT) TABS Take by mouth every morning (before breakfast)  Yes Historical Provider, MD   nitrofurantoin, macrocrystal-monohydrate, (MACROBID) 100 MG capsule Take 100 mg by mouth every 12 hours Yes Historical Provider, MD   magnesium oxide (MAG-OX) 400 MG tablet Take 1 tablet by mouth daily Yes Cristhian England MD   betaxolol (KERLONE) 10 MG tablet TAKE ONE TABLET BY MOUTH TWICE A DAY Yes Cristhian England MD   rosuvastatin (CRESTOR) 40 MG tablet TAKE ONE TABLET BY MOUTH EVERY EVENING. NO ADDITIONAL REFILLS UNTIL FASTING LABS ARE DRAWN. Yes Maria Esther Myers MD   fenofibrate (TRIGLIDE) 160 MG tablet TAKE ONE TABLET BY MOUTH DAILY Yes MARIELENA Narayan CNP   nitroGLYCERIN (NITROSTAT) 0.4 MG SL tablet DISSOLVE 1 TAB UNDER TONGUE FOR CHEST PAIN - IF PAIN REMAINS AFTER 5 MIN, CALL 911 AND REPEAT DOSE. MAX 3 TABS IN 15 MINUTES Yes MARIELENA Narayan CNP   pantoprazole (PROTONIX) 40 MG tablet Take 1 tablet by mouth 2 times daily prescribing med until patient can get into GI doctor Yes Maria Esther Myers MD   Elastic Bandages & Supports (MEDICAL COMPRESSION STOCKINGS) [address] 1 each by Does not apply route daily as needed (swelling) Knee high 15-20 mmHg Yes Maria Esther Myers MD   HYDROcodone-acetaminophen (NORCO) 5-325 MG per tablet Take 1 tablet by mouth every 6 hours as needed for Pain Yes Historical Provider, MD   venlafaxine (EFFEXOR) 75 MG tablet Take 100 mg by mouth daily  Yes Historical Provider, MD   polycarbophil (FIBER-LAX) 625 MG tablet Take 625 mg by mouth daily Yes Historical Provider, MD   Biotin 1000 MCG TABS Take by mouth Daily Yes Historical Provider, MD   docusate sodium (COLACE) 100 MG capsule   Take 200 mg by mouth nightly  Yes Historical Provider, MD   aspirin 81 MG EC tablet Take 81 mg by mouth daily. Yes Historical Provider, MD   Multiple Vitamins-Minerals (THERAPEUTIC MULTIVITAMIN-MINERALS) tablet Take 1 tablet by mouth daily. Yes Historical Provider, MD   vitamin B-12 (CYANOCOBALAMIN) 100 MCG tablet Take 50 mcg by mouth daily.   Patient not taking: Reported on 4/6/2022  Historical Provider, MD        Allergies   Allergen Reactions    Codeine Itching and Hives    Nsaids      Bleeding ulcer  Gi bleed    Prochlorperazine Anaphylaxis    Prochlorperazine Edisylate Swelling    Sulfa Antibiotics Itching and Hives    Coreg [Carvedilol] Itching       Past Medical History:   Diagnosis Date    Arthritis     bilateral hands, bilateral knees, neck    Blood transfusion  CAD (coronary artery disease)     Coronary stent 2015 most recent    RCA stents on 3 occasions , ,     Febrile seizure ([facility] [address] 1953    Gastroesophageal reflux disease     History of blood transfusion     Hyperlipidemia     Hypertension        Past Surgical History:   Procedure Laterality Date    ABLATION OF DYSRHYTHMIC FOCUS  2015    Dr Leon Davis, [facility], sinus node modification     APPENDECTOMY      CAROTID ENDARTERECTOMY Left 14    LEFT CAROTID ENDARTERECTOMY               CHOLECYSTECTOMY      COLONOSCOPY      CORONARY ANGIOPLASTY WITH STENT PLACEMENT  3/2009    @BN - DWAYNE X 2 to RCA    CORONARY ANGIOPLASTY WITH STENT PLACEMENT  2012    Dr Melonie Carvalho, [address]  2015    [facility]re DWAYNE/[address]  2012. 2012    bilateral    FRACTURE SURGERY  2013    ORIF right wrist    HAND SURGERY      Left hand    HYSTERECTOMY      INSERTABLE CARDIAC MONITOR  10/15/2014    Dr Leon Davis, [address] EXTRACTION Bilateral [id number]    rt     OTHER SURGICAL HISTORY  14    excision right nasal dorsum epidermal cyst with frozen section lesion superior nasal dorsem    SKIN BIOPSY      UPPER GASTROINTESTINAL ENDOSCOPY  2016       Social History     Tobacco Use    Smoking status: Former Smoker     Packs/day: 1.00     Years: 0.50     Pack years: 0.50     Quit date: 1964     Years since quittin.3    Smokeless tobacco: Never Used    Tobacco comment: smoked 2-3 months when 19   Substance Use Topics    Alcohol use: Yes     Alcohol/week: 0.0 standard drinks     Comment: occasionally        Family History   Adopted: Yes       Physical Exam  Constitutional:       Appearance: She is well-developed. She is not diaphoretic. HENT:      Head: Normocephalic and atraumatic. Eyes:      General: No scleral icterus. Extraocular Movements: Extraocular movements intact. Conjunctiva/sclera: Conjunctivae normal.   Neck:      Vascular: No JVD. Cardiovascular:      Rate and Rhythm: Normal rate and regular rhythm. Heart sounds: Murmur heard. No friction rub. No gallop. Comments: 2/6 systolic murmur  Pulmonary:      Effort: Pulmonary effort is normal. No respiratory distress. Breath sounds: Normal breath sounds. No wheezing or rales. Abdominal:      General: Bowel sounds are normal.      Palpations: Abdomen is soft. Tenderness: There is no abdominal tenderness. Musculoskeletal:         General: Normal range of motion. Cervical back: Normal range of motion and neck supple. Skin:     General: Skin is warm and dry. Findings: No rash. Neurological:      General: No focal deficit present. Mental Status: She is alert and oriented to person, place, and time. Psychiatric:         Mood and Affect: Mood normal.         Behavior: Behavior normal.         Thought Content: Thought content normal.         Judgment: Judgment normal.       PHYSICAL EXAMINATION:    Vitals:    04/06/22 1050 04/06/22 1144   BP: (!) 128/90 110/84   Pulse: 64    SpO2: 99%    Weight: 163 lb 11.2 oz (74.3 kg)    Height: 5' 2\" (1.575 m)      Estimated body mass index is 29.94 kg/m² as calculated from the following:    Height as of this encounter: 5' 2\" (1.575 m). Weight as of this encounter: 163 lb 11.2 oz (74.3 kg). I have reviewed all pertinent lab results and diagnostic testing.     Lab Results   Component Value Date    CHOL 185 05/07/2016    TRIG 189 05/07/2016    HDL 42 05/07/2016    HDL 42 01/25/2012    LDLCALC 105 05/07/2016     Lab Results   Component Value Date     06/13/2016    K 4.4 06/13/2016     06/13/2016    CO2 19 06/13/2016    GLUCOSE 85 06/13/2016    BUN 31 06/13/2016    CREATININE 1.5 06/13/2016    CALCIUM 10.3 06/13/2016    GFR 48 04/03/2013    GFRAA 42 06/13/2016    GFRAA 58 04/03/2013     Lab Results   Component Value Date    ALT 33 05/06/2016    AST 38 05/06/2016     Care Everywhere labs reviewed    Sydnie Briones 7/21/21:  Summary    The overall quality of the study is good. There is breast attenuation.        Left ventricular cavity is noted to be normal size. The right ventricle is    normal.        SPECT images demonstrate homogeneous tracer distribution throughout the    myocardium. There is normal isotope uptake at stress and rest. There is no    evidence of myocardial ischemia or scar. Gated SPECT imaging reveals normal    myocardial thickening and wall motion.        The left ventricular ejection fraction is 73%.        Sum stress score of 1. No visual TID. Calculated TID is 0.82.        Myocardial perfusion is normal. Low risk scan       Lexiscan myoview 10/14/19: ([place] [facility])   Normal study without evidence for ischemia or scarring. LV function is normal    Echo 10/14/19: ([facility])   SUMMARY:    1. Left ventricle: The cavity size was normal. There was mild     concentric hypertrophy. Systolic function was normal. The     estimated ejection fraction was in the range of 50% to 55%. Wall     motion was normal; there were no regional wall motion     abnormalities. The tissue Doppler parameters were normal. Left     ventricular diastolic function parameters were normal.  2. Aortic valve: There was trivial regurgitation. 3. Right ventricle: The cavity size was normal. Wall thickness was     normal. Systolic function was normal.  4. Pericardium, extracardiac: There was no pericardial effusion. Carotid US 8/13/19:   Summary        1. There is large plaque seen in the right internal carotid artery with an    estimated diameter reduction of 50-69%.  This could be underestimated due to    calcific shadowing.    2. There is minimal plaque seen in the left internal carotid artery with an    estimated diameter reduction of <50%.    3. The vertebral arteries are patent with antegrade flow bilaterally.    4. There is a >50% stenosis involving the right external carotid artery.    5. No significant changes in the internal carotid arteries compared to    previous exam on 8/30/2018. Carotid ultrasound 8/30/18:      Summary        1. There is moderate plaque seen in the right internal carotid artery with    an estimated diameter reduction of 50-69%.    2. There is minimal plaque seen in the left internal carotid artery with an    estimated diameter reduction of <50%.    3. The vertebral arteries are patent with antegrade flow bilaterally.    4. There is a >50% stenosis involving the right external carotid artery. Echo 2/22/18:  Normal left ventricle size, wall thickness, and systolic function with an   EF of 65%. Aortic valve appears sclerotic but opens adequately. Mild aortic regurgitation. Mild mitral annular calcification is present. Trace mitral regurgitation noted. Trivial tricuspid regurgitation with a RVSP estimation of 29 mmHg. Lexiscan myoview GXT 2/22/18:  No EKG evidence for ischemia with lexiscan   Normal LV size and systolic function. There is normal isotope uptake at stress and rest. There is no evidence of  myocardial ischemia or scar. Carotid US 8/2017:  Right:    50-80% stenosis of the internal carotid artery based on velocity criteria    and B-Mode image. <50% stenosis of the common carotid artery. >50% stenosis of the external carotid artery. Unremarkable subclavian and vertebral arteries. Left:    <50% stenosis of the internal carotid artery based on velocity criteria and    B-Mode image. <50% stenosis of the common carotid artery. <50% stenosis of the external carotid artery. Unremarkable subclavian and vertebral arteries.           Echo 7/7/17: ([address])  Conclusions:  Normal LV size, hyperdynamic LV function with EF greater  than 75 percent, abnormal relaxation Mild-to-moderate aortic regurgitation, mild tricuspid regurgitation    Stress Myoview 5/2016  Summary   No EKG evidence for ischemia with exercise   Preserved LV systolic function   Myocardial perfusion imaging with no evidence for ischemia with exercise   Stress Protocols    Resting ECG    NSR      Echo: 1/19/16  The left ventricle is normal in size. Normal left ventricular systolic function, with estimated ejection fraction of 55%-60%. No regional wall motion abnormalities noted. Mild concentric left ventricular hypertrophy is present. Diastolic filling parameters suggests grade I diastolic dysfunction. Aortic valve is not well visualized but appears mildly sclerotic and opens adequately. Mild mitral and aortic regurgitation are present. Trivial tricuspid regurgitation. Systolic pulmonary artery pressure (SPAP) is normal and estimated at 27mmHg (RA pressure 3 mm Hg). CATH: 8/27/15  [facility] SUMMARY  ~LM normal  ~LAD 40% mid  ~Cx 20% OM1  ~% prox instent, 50% mid, 50% PDA,   (anomalous anterior and vertical takeoff, AL1 engaged adequately, JR4 did not engage ostium)  ~LVG 50%, inferior hk     Impression  ~Angiography w/ acute RCA occlusion  ~LVEDP 20  ~LVG with borderline EF with inferior RWMA   Intervention  ~PCI of RCA with [id number] (14ATM)     Cardiac Cath 12/3/13:  ANGIOGRAPHIC FINDINGS:   1. Mild-to-moderate nonobstructive coronary artery disease with patent right   coronary artery stents from prior procedures. 2. Normal left ventricular function   3. Normal hemodynamics. ASSESSMENT/PLAN:  1. Coronary artery disease due to lipid rich plaque  -   [facility] 8/2015 showed prox RCA stent occluded with nonobstructive disease in mid RCA, PDA, LAD, and OM-1.  EF 50% . S/p DWAYNE to in-stent restenosis of RCA  - 10/2019 Echo - EF 50-55%, normal wall motion  -  Nuclear stress tests in 5/2016, 2/2018, 10/14/19, and 7/21/21 all showed normal perfusion.    -  Treated with ASA, statin. Plan : Atypical chest pain. Continue medical management.     2. Essential hypertension  -  Blood pressure 110/84, pulse 64, height 5' 2\" (1.575 m), weight 163 lb 11.2 oz (74.3 kg), SpO2 99 %, not currently breastfeeding.  -  tx with Betaxolol 10 mg twice a day  - 10/2019 Echo - mild [facility]    Plan : Stable. Continue current medical regimen. 3. Dyslipidemia  -  treated with Fenofibrate 160 mg and Crestor 40 mg  - 2/4/22 : , ,  HDL 32,  LDL 50. Liver enzymes normal     Plan  : Stable. Continue current medical regimen. 4. Nonrheumatic aortic valve insufficiency  -  7/2017 Echo ([address]) --  mild to moderate AI  -  2/2018 Echo -- mild AI with normal EF  -  10/14/19 Echo  -- trivial AI    Plan : Stable. Continue current medical regimen. 5.  Carotid artery disease:  -  hx of left CEA 7/2014  - 8/2019 duplex: LALY 14-43%, LICA <38%  - patient cancelled repeat duplex scheduled 9/2020    Plan :  Some symptoms similar to prior to need for CEA. Carotid duplex. PLAN:  1. Carotid duplex. 2.  Fasting lipid profile, LFTs, CK.   3.  Magnesium supplement for muscle spasms; previously low. 4.  Magnesium 400 mg daily. 5.  RTC in 6 months. An  electronic signature was used to authenticate this note. Raul Yuan MD, Aziza Bellae's attestation: This note was scribed in the presence of Belén Mckinney M.D. by [place] Dread, RN    Physician Attestation: The scribe's documentation has been prepared under my direction and personally reviewed by me in its entirety. I confirm that the note above accurately reflects all work, treatment, procedures, and medical decision making performed by me.

## 2022-04-06 ENCOUNTER — OFFICE VISIT (OUTPATIENT)
Dept: CARDIOLOGY CLINIC | Age: 77
End: 2022-04-06
Payer: COMMERCIAL

## 2022-04-06 ENCOUNTER — HOSPITAL ENCOUNTER (OUTPATIENT)
Dept: VASCULAR LAB | Age: 77
Discharge: HOME | End: 2022-04-06
Payer: COMMERCIAL

## 2022-04-06 VITALS
SYSTOLIC BLOOD PRESSURE: 110 MMHG | DIASTOLIC BLOOD PRESSURE: 84 MMHG | WEIGHT: 163.7 LBS | OXYGEN SATURATION: 99 % | BODY MASS INDEX: 30.12 KG/M2 | HEIGHT: 62 IN | HEART RATE: 64 BPM

## 2022-04-06 DIAGNOSIS — I10 ESSENTIAL HYPERTENSION: ICD-10-CM

## 2022-04-06 DIAGNOSIS — E78.5 DYSLIPIDEMIA: ICD-10-CM

## 2022-04-06 DIAGNOSIS — I35.1 NONRHEUMATIC AORTIC VALVE INSUFFICIENCY: ICD-10-CM

## 2022-04-06 DIAGNOSIS — I25.83 CORONARY ARTERY DISEASE DUE TO LIPID RICH PLAQUE: Primary | ICD-10-CM

## 2022-04-06 DIAGNOSIS — I65.23 BILATERAL CAROTID ARTERY STENOSIS: ICD-10-CM

## 2022-04-06 DIAGNOSIS — E83.42 HYPOMAGNESEMIA: ICD-10-CM

## 2022-04-06 DIAGNOSIS — I25.10 CORONARY ARTERY DISEASE DUE TO LIPID RICH PLAQUE: Primary | ICD-10-CM

## 2022-04-06 DIAGNOSIS — I65.29 CAROTID ATHEROSCLEROSIS, UNSPECIFIED LATERALITY: ICD-10-CM

## 2022-04-06 PROCEDURE — G8400 PT W/DXA NO RESULTS DOC: HCPCS | Performed by: INTERNAL MEDICINE

## 2022-04-06 PROCEDURE — 99214 OFFICE O/P EST MOD 30 MIN: CPT | Performed by: INTERNAL MEDICINE

## 2022-04-06 PROCEDURE — 1090F PRES/ABSN URINE INCON ASSESS: CPT | Performed by: INTERNAL MEDICINE

## 2022-04-06 PROCEDURE — G8417 CALC BMI ABV UP PARAM F/U: HCPCS | Performed by: INTERNAL MEDICINE

## 2022-04-06 PROCEDURE — 1123F ACP DISCUSS/DSCN MKR DOCD: CPT | Performed by: INTERNAL MEDICINE

## 2022-04-06 PROCEDURE — 1036F TOBACCO NON-USER: CPT | Performed by: INTERNAL MEDICINE

## 2022-04-06 PROCEDURE — G8427 DOCREV CUR MEDS BY ELIG CLIN: HCPCS | Performed by: INTERNAL MEDICINE

## 2022-04-06 PROCEDURE — 4040F PNEUMOC VAC/ADMIN/RCVD: CPT | Performed by: INTERNAL MEDICINE

## 2022-04-06 PROCEDURE — 3017F COLORECTAL CA SCREEN DOC REV: CPT | Performed by: INTERNAL MEDICINE

## 2022-04-06 PROCEDURE — 93880 EXTRACRANIAL BILAT STUDY: CPT

## 2022-04-06 RX ORDER — MAGNESIUM OXIDE 400 MG/1
400 TABLET ORAL DAILY
Qty: 90 TABLET | Refills: 3 | Status: SHIPPED | OUTPATIENT
Start: 2022-04-06 | End: 2022-10-04

## 2022-04-06 RX ORDER — MAGNESIUM OXIDE 400 MG/1
400 TABLET ORAL DAILY
COMMUNITY
Start: 2022-02-07

## 2022-04-06 RX ORDER — NITROFURANTOIN 25; 75 MG/1; MG/1
100 CAPSULE ORAL EVERY 12 HOURS
COMMUNITY
Start: 2022-04-01 | End: 2022-10-04 | Stop reason: ALTCHOICE

## 2022-04-06 NOTE — PATIENT INSTRUCTIONS
1. Magnesium oxide 400 mg daily   2. Magnesium level in one month  3.  Schedule carotid artery ultrsound soon  4. Call our office with any concerning cardiac symptoms  5.   Follow up with [facility] in six months, Dr. Bhavik Haas in one year

## 2022-06-08 NOTE — TELEPHONE ENCOUNTER
Last OV: 4-6-22 kjc  Last labs: 2-4-22 lipid ket[facility]  Appt scheduled : 4-6-23 kjc  last refill:1-26-22 kjc

## 2022-06-13 RX ORDER — ROSUVASTATIN CALCIUM 40 MG/1
TABLET, COATED ORAL
Qty: 30 TABLET | Refills: 9 | Status: SHIPPED | OUTPATIENT
Start: 2022-06-13

## 2022-10-04 ENCOUNTER — OFFICE VISIT (OUTPATIENT)
Dept: CARDIOLOGY CLINIC | Age: 77
End: 2022-10-04
Payer: MEDICARE

## 2022-10-04 VITALS
DIASTOLIC BLOOD PRESSURE: 72 MMHG | SYSTOLIC BLOOD PRESSURE: 124 MMHG | BODY MASS INDEX: 31.54 KG/M2 | HEIGHT: 62 IN | WEIGHT: 171.4 LBS | OXYGEN SATURATION: 96 % | HEART RATE: 82 BPM

## 2022-10-04 DIAGNOSIS — E78.2 MIXED HYPERLIPIDEMIA: ICD-10-CM

## 2022-10-04 DIAGNOSIS — I25.10 CORONARY ARTERY DISEASE DUE TO LIPID RICH PLAQUE: Primary | ICD-10-CM

## 2022-10-04 DIAGNOSIS — I25.83 CORONARY ARTERY DISEASE DUE TO LIPID RICH PLAQUE: Primary | ICD-10-CM

## 2022-10-04 DIAGNOSIS — I10 PRIMARY HYPERTENSION: ICD-10-CM

## 2022-10-04 PROCEDURE — G8427 DOCREV CUR MEDS BY ELIG CLIN: HCPCS | Performed by: NURSE PRACTITIONER

## 2022-10-04 PROCEDURE — 1090F PRES/ABSN URINE INCON ASSESS: CPT | Performed by: NURSE PRACTITIONER

## 2022-10-04 PROCEDURE — 1036F TOBACCO NON-USER: CPT | Performed by: NURSE PRACTITIONER

## 2022-10-04 PROCEDURE — 1123F ACP DISCUSS/DSCN MKR DOCD: CPT | Performed by: NURSE PRACTITIONER

## 2022-10-04 PROCEDURE — 99214 OFFICE O/P EST MOD 30 MIN: CPT | Performed by: NURSE PRACTITIONER

## 2022-10-04 PROCEDURE — G8484 FLU IMMUNIZE NO ADMIN: HCPCS | Performed by: NURSE PRACTITIONER

## 2022-10-04 PROCEDURE — 93000 ELECTROCARDIOGRAM COMPLETE: CPT | Performed by: NURSE PRACTITIONER

## 2022-10-04 PROCEDURE — G8400 PT W/DXA NO RESULTS DOC: HCPCS | Performed by: NURSE PRACTITIONER

## 2022-10-04 PROCEDURE — G8417 CALC BMI ABV UP PARAM F/U: HCPCS | Performed by: NURSE PRACTITIONER

## 2022-10-04 RX ORDER — NITROGLYCERIN 0.4 MG/1
TABLET SUBLINGUAL
Qty: 25 TABLET | Refills: 2 | Status: SHIPPED | OUTPATIENT
Start: 2022-10-04

## 2022-10-04 NOTE — PROGRESS NOTES
[facility]     Outpatient Follow Up Note    Yvette Klein is 69 y.o. 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 Her other history includes  s/p EPS - RFCA of sinus node for inappropriate sinus tachycardia July '15    CHIEF COMPLAINT / HPI:  Follow Up secondary to coronary artery disease    Subjective:   She's been dizzy and SOB a lot. 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 She's tired    She denies chest discomfort. She hasn't need to use NTG SL in years  Her ankles swell by the end of the day. Her heart beats faster when laying down at night. Her pulse runs ~ 110  She can't do much since having PNA end of July first of August. She'd be SOB walking a couple of times around the office. She thinks its in part d/t deconditioning. She's not aware of having sleep apnea. She snores some but not all the time. She denies orthopnea/PND. Her wt is up since working too much and eating junk food. The patient is experiencing palpitations yet not like before her ablation. Her home BP  ~ 160/60 checked by one of her other physician's earlier today    These symptoms show some change since the last OV. With regard to medication therapy the patient has been compliant with prescribed regimen. They have tolerated therapy to date.      Past Medical History:   Diagnosis Date    Arthritis     bilateral hands, bilateral knees, neck    Blood transfusion     CAD (coronary artery disease)     Coronary stent 8/27/2015 most recent    RCA stents on 3 occasions 2009, 2012, 2015    Febrile seizure ([facility] [address] 1953    Gastroesophageal reflux disease     History of blood transfusion     Hyperlipidemia     Hypertension      Social History:    Social History     Tobacco Use   Smoking Status Former    Packs/day: 1.00    Years: 0.50    Pack years: 0.50    Types: Cigarettes    Quit date: 1964    Years since quittin.7   Smokeless Tobacco Never   Tobacco Comments    smoked 2-3 months when 19     Current Medications:  Current Outpatient Medications   Medication Sig Dispense Refill    rosuvastatin (CRESTOR) 40 MG tablet TAKE ONE TABLET BY MOUTH EVERY EVENING 30 tablet 9    magnesium oxide (MAG-OX) 400 MG tablet Take 400 mg by mouth daily      Cholecalciferol (VITAMIN D-3 PO) Take 2,000 Units by mouth every morning (before breakfast)      betaxolol (KERLONE) 10 MG tablet TAKE ONE TABLET BY MOUTH TWICE A  tablet 1    HYDROcodone-acetaminophen (NORCO) 5-325 MG per tablet Take 1 tablet by mouth every 6 hours as needed for Pain      venlafaxine (EFFEXOR) 75 MG tablet Take 100 mg by mouth daily      Biotin 1000 MCG TABS Take by mouth Daily      docusate sodium (COLACE) 100 MG capsule   Take 200 mg by mouth nightly       aspirin 81 MG EC tablet Take 81 mg by mouth daily. Multiple Vitamins-Minerals (THERAPEUTIC MULTIVITAMIN-MINERALS) tablet Take 1 tablet by mouth daily. nitroGLYCERIN (NITROSTAT) 0.4 MG SL tablet DISSOLVE 1 TAB UNDER TONGUE FOR CHEST PAIN - IF PAIN REMAINS AFTER 5 MIN, CALL 911 AND REPEAT DOSE. MAX 3 TABS IN 15 MINUTES (Patient not taking: Reported on 10/4/2022) 25 tablet 2    Elastic Bandages & Supports (MEDICAL COMPRESSION STOCKINGS) MISC 1 each by Does not apply route daily as needed (swelling) Knee high 15-20 mmHg 1 each 0    polycarbophil (FIBERCON) 625 MG tablet Take 625 mg by mouth daily (Patient not taking: Reported on 10/4/2022)       No current facility-administered medications for this visit. REVIEW OF SYSTEMS:    CONSTITUTIONAL: No major weight gain or loss, + fatigue, weakness, night sweats or fever. HEENT: No new vision difficulties or ringing in the ears. RESPIRATORY: No new SOB, PND, orthopnea or cough.    CARDIOVASCULAR: See HPI  GI: No nausea, vomiting, diarrhea, constipation, abdominal pain or changes in bowel habits. : No urinary frequency, urgency, incontinence hematuria or dysuria. SKIN: No cyanosis or skin lesions. MUSCULOSKELETAL: No new muscle or joint pain. NEUROLOGICAL: + dizziness when turning head. PSYCHIATRIC: No anxiety, pain, insomnia or depression    Objective:   PHYSICAL EXAM:        Vitals:    10/04/22 1338 10/04/22 1340 10/04/22 1402   BP: 130/70 120/70 124/72   Site: Right Upper Arm Right Upper Arm    Position: Sitting Standing    Cuff Size: Large Adult Large Adult    Pulse: 82     SpO2: 96%     Weight: 171 lb 6.4 oz (77.7 kg)     Height: 5' 2\" (1.575 m)           VITALS:  /72   Pulse 82   Ht 5' 2\" (1.575 m)   Wt 171 lb 6.4 oz (77.7 kg)   SpO2 96%   BMI 31.35 kg/m²   CONSTITUTIONAL: Cooperative, no apparent distress, and appears well nourished / developed  NEUROLOGIC:  Awake and orientated to person, place and time. PSYCH: Calm affect. SKIN: Warm and dry. HEENT: Sclera non-icteric, normocephalic, neck supple, no elevation of JVP, normal carotid pulses with no bruits and thyroid normal size. LUNGS:  No increased work of breathing and clear to auscultation, no crackles or wheezing  CARDIOVASCULAR:  Regular rate 80 and rhythm with no murmurs, gallops, rubs, or abnormal heart sounds, normal PMI. The apical impulses not displaced  JVP less than 8 cm H2O  Heart tones are crisp and normal  Cervical veins are not engorged  The carotid upstroke is normal in amplitude and contour without delay or bruit  JVP is not elevated  ABDOMEN:  Normal bowel sounds, non-distended and non-tender to palpation  EXT: No edema, no calf tenderness. Pulses are present bilaterally.     DATA:      LABS:     2/4/22:  Cholesterol <200 mg/dL 121    Triglycerides <150 mg/dL 197 Abnormal     HDL 40 - 60 mg/dL 32 Abnormal     LDL Cholesterol 0 - 99 mg/dL 50    VLDL 0 - 40 mg/dl 39          9/922:    SODIUM 135 - 145 mEq/L 140     POTASSIUM 3.6 - 5.1 mEq/L 4.9     CHLORIDE 98 - 111 mEq/L 106 CO2 21 - 31 mmol/L 25     GLUCOSE, RANDOM 70 - 99 mg/dL 125 High      BLD UREA NITROGEN 8 - 26 mg/dL 31 High      CREATININE 0.60 - 1.20 mg/dL 1.54 High      CALCIUM 8.5 - 10.4 mg/dL 10.5 High      TOTAL PROTEIN 6.0 - 8.0 g/dL 7.6     ALBUMIN 3.5 - 5.7 g/dL 4.1     TOTAL BILIRUBIN 0.0 - 1.2 mg/dL 0.3     ALK PHOSPHATASE 35 - 135 IU/L 100     AST 10 - 40 IU/L 27     ALT 10 - 60 IU/L 20     ESTIMATED GFR >59 mL/min/1.73 m2 35 Low       WBC 3.6 - 10.5 THOU/mcL 11.0 High      RBC 3.80 - 5.20 MIL/mcL 4.10     HEMOGLOBIN 12.0 - 15.2 g/dL 12.8     HEMATOCRIT 36 - 46 % 39.6     MCV 82 - 97 fL 96.4     MCH 27 - 33 pg 31.2     MCHC 32 - 36 g/dL 32.3     RDW 12.3 - 17.0 % 13.7     PLATELET 091 - 375 THOU/mcL 340       Radiology Review:  Pertinent images / reports were reviewed as a part of this visit and reveals the following:    Carotid US : April '22: Additional Indications:KARLOS Hx of Left CEA       Impressions   Right Impression   The right internal carotid artery appears to have a <50% diameter reducing   stenosis based on velocity criteria. The right vertebral artery demonstrates normal antegrade flow. The right subclavian artery is visualized and demonstrates multiphasic flow. Left Impression   The left internal carotid artery appears to have a <50% diameter reducing   stenosis based on velocity criteria. The left vertebral artery demonstrates normal retrograde flow. The left subclavian artery is visualized and demonstrates multiphasic flow. Myoview: July '21  Summary    The overall quality of the study is good. There is breast attenuation. Left ventricular cavity is noted to be normal size. The right ventricle is    normal.        SPECT images demonstrate homogeneous tracer distribution throughout the    myocardium. There is normal isotope uptake at stress and rest. There is no    evidence of myocardial ischemia or scar.  Gated SPECT imaging reveals normal    myocardial thickening and wall motion. The left ventricular ejection fraction is 73%. Sum stress score of 1. No visual TID. Calculated TID is 0.82. Myocardial perfusion is normal. Low risk scan     Echo: Oct '19  SUMMARY:   1. Left ventricle: The cavity size was normal. There was mild      concentric hypertrophy. Systolic function was normal. The      estimated ejection fraction was in the range of 50% to 55%. Wall      motion was normal; there were no regional wall motion      abnormalities. The tissue Doppler parameters were normal. Left      ventricular diastolic function parameters were normal.   2. Aortic valve: There was trivial regurgitation. 3. Right ventricle: The cavity size was normal. Wall thickness was      normal. Systolic function was normal.   4. Pericardium, extracardiac: There was no pericardial effusion. [facility] SUMMARY 8/27/15:  ~LM normal  ~LAD 40% mid  ~Cx 20% OM1  ~% prox instent, 50% mid, 50% PDA (anomalous anterior and vertical takeoff, AL1 engaged adequately, JR4 did not engage ostium)  ~LVG 50%, inferior hk  Intervention  ~PCI of RCA with [id number] (14ATM)    angiogram: 1/24/12:  70% prox RCA after previously placed stents . Anomalous origin of RCA above cusp.   with excellent result. EF 60%    Angiogram : March '09:  RCA stent One Arch Giancarlo  Assessment:      Diagnosis Orders   1. Coronary artery disease due to lipid rich plaque   ~stable : denies angina  ~s/p PTCA RCA '09, '12 and '15  ~NM from July '21 : no ischemia  ~ASA / BB / statin             2. Primary hypertension   ~controlled on current regimen     3. Mixed hyperlipidemia   ~trig and HDL not at goal from Feb '22; last A1c 6/3%  ~crestor 40 mg daily / fenofibrate 160 mg daily      I had the opportunity to review the clinical symptoms and presentation of Gustavo Obrien. Plan:     1. EKG : sinus rhythm 70  2.  F/U in six months     OTC meclizine as needed for dizziness    Overall the patient is stable from CV standpoint    I have addresed the patient's cardiac risk factors and adjusted pharmacologic treatment as needed. In addition, I have reinforced the need for patient directed risk factor modification. Further evaluation will be based upon the patient's clinical course and testing results. All questions and concerns were addressed to the patient/. Alternatives to my treatment were discussed. The patient is not currently smoking. The risks related to smoking were reviewed with the patient. Recommend maintaining a smoke-free lifestyle. Patient is on a beta-blocker : betaxolol   Patient is not on an ace-i/ARB : neg CHF. Lisinopril stopped in Feb '18  Patient is on a statin    Antiplatelet therapy has been recommended / prescribed for this patient. Education conducted on adverse reactions including bleeding was discussed. The patient verbalizes understanding not to stop medications without discussing with us. Discussed exercise: 30-60 minutes 7 days/week  Discussed Low saturated fat diet. Thank you for allowing to us to participate in the care of Aisha Mayes.     MARIELENA Stark    Documentation of today's visit sent to PCP

## 2022-12-05 RX ORDER — BETAXOLOL 10 MG/1
TABLET, FILM COATED ORAL
Qty: 180 TABLET | Refills: 1 | Status: SHIPPED | OUTPATIENT
Start: 2022-12-05

## 2022-12-05 NOTE — TELEPHONE ENCOUNTER
Last OV: 4-6-22 kjc  Last labs: 9-9-22 cmp   Appt scheduled : 4-6-23 kjc recall list  Last refill:3-10-22 Winnie Mitchell

## 2023-04-25 ENCOUNTER — TELEPHONE (OUTPATIENT)
Dept: CARDIOLOGY CLINIC | Age: 78
End: 2023-04-25

## 2023-04-25 NOTE — TELEPHONE ENCOUNTER
Pt needs to cancel appt tomorrow with [facility] due to having to work. Pt asking if she can still be worked in so she is not waiting to late to [facility]. Please advise.

## 2023-04-25 NOTE — TELEPHONE ENCOUNTER
Please let her know that it may take a while to get back in for [facility]. I will look, but cannot guarantee a spot. If she wants to keep tomorrow's she can be added back on.

## 2023-04-26 NOTE — TELEPHONE ENCOUNTER
I spoke with pt. She stated that missing the appointment was unavoidable. She was ok with the appt with [facility].

## 2023-05-02 ENCOUNTER — OFFICE VISIT (OUTPATIENT)
Dept: CARDIOLOGY CLINIC | Age: 78
End: 2023-05-02

## 2023-05-02 ENCOUNTER — HOSPITAL ENCOUNTER (OUTPATIENT)
Age: 78
Discharge: HOME | End: 2023-05-02
Payer: MEDICARE

## 2023-05-02 VITALS
HEART RATE: 89 BPM | SYSTOLIC BLOOD PRESSURE: 116 MMHG | BODY MASS INDEX: 31.49 KG/M2 | DIASTOLIC BLOOD PRESSURE: 68 MMHG | HEIGHT: 62 IN | OXYGEN SATURATION: 97 % | WEIGHT: 171.1 LBS

## 2023-05-02 DIAGNOSIS — R06.02 SHORTNESS OF BREATH: ICD-10-CM

## 2023-05-02 DIAGNOSIS — I10 ESSENTIAL HYPERTENSION: ICD-10-CM

## 2023-05-02 DIAGNOSIS — I25.10 CORONARY ARTERY DISEASE DUE TO LIPID RICH PLAQUE: Primary | ICD-10-CM

## 2023-05-02 DIAGNOSIS — E78.2 MIXED HYPERLIPIDEMIA: ICD-10-CM

## 2023-05-02 DIAGNOSIS — I25.83 CORONARY ARTERY DISEASE DUE TO LIPID RICH PLAQUE: Primary | ICD-10-CM

## 2023-05-02 DIAGNOSIS — R00.0 TACHYCARDIA: ICD-10-CM

## 2023-05-02 DIAGNOSIS — I35.1 NONRHEUMATIC AORTIC VALVE INSUFFICIENCY: ICD-10-CM

## 2023-05-02 LAB
DEPRECATED RDW RBC AUTO: 14.5 % (ref 12.4–15.4)
HCT VFR BLD AUTO: 37 % (ref 36–48)
HGB BLD-MCNC: 11.8 G/DL (ref 12–16)
MCH RBC QN AUTO: 32.1 PG (ref 26–34)
MCHC RBC AUTO-ENTMCNC: 31.8 G/DL (ref 31–36)
MCV RBC AUTO: 101 FL (ref 80–100)
NT-PROBNP SERPL-MCNC: 294 PG/ML (ref 0–449)
PLATELET # BLD AUTO: 259 K/UL (ref 135–450)
PMV BLD AUTO: 9.5 FL (ref 5–10.5)
RBC # BLD AUTO: 3.67 M/UL (ref 4–5.2)
WBC # BLD AUTO: 9.7 K/UL (ref 4–11)

## 2023-05-02 PROCEDURE — 80048 BASIC METABOLIC PNL TOTAL CA: CPT

## 2023-05-02 PROCEDURE — 83735 ASSAY OF MAGNESIUM: CPT

## 2023-05-02 PROCEDURE — 80061 LIPID PANEL: CPT

## 2023-05-02 PROCEDURE — 36415 COLL VENOUS BLD VENIPUNCTURE: CPT

## 2023-05-02 PROCEDURE — 85027 COMPLETE CBC AUTOMATED: CPT

## 2023-05-02 PROCEDURE — 83880 ASSAY OF NATRIURETIC PEPTIDE: CPT

## 2023-05-02 RX ORDER — VALACYCLOVIR HYDROCHLORIDE 500 MG/1
500 TABLET, FILM COATED ORAL DAILY
COMMUNITY
Start: 2023-02-27

## 2023-05-02 RX ORDER — NITROGLYCERIN 0.4 MG/1
TABLET SUBLINGUAL
Qty: 25 TABLET | Refills: 2 | Status: SHIPPED | OUTPATIENT
Start: 2023-05-02

## 2023-05-02 NOTE — PROGRESS NOTES
[address]     Outpatient Follow Up Note    CHIEF COMPLAINT / HPI:  Follow Up secondary to coronary artery disease    Subjective:   Augustine Restrepo is 69 y.o. female who presents today with a history of CAD, HTN, HLD, carotid stenosis,       Today, Ms Liza Rahman complains of some occasional tightness in her chest which occurs without regularity. Exertional shortness of breath has worsened over the past 6 months and she noticed she wasn't able to keep up with her  when walking in the mall. She is tired all of the time. Exercise and activity is limited due to chronic back and knee pain. Ms Liza Rahman says heart rate has been faster than what it should be for what she's doing (which is similar to anginal equivalent). 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 With regard to medication therapy the patient has been compliant with prescribed regimen. They have tolerated therapy to date. Past Medical History:   Diagnosis Date    Arthritis     bilateral hands, bilateral knees, neck    Blood transfusion     CAD (coronary artery disease)     Coronary stent 2015 most recent    RCA stents on 3 occasions , ,     Febrile seizure ([facility] [address] 1953    Gastroesophageal reflux disease     History of blood transfusion     Hyperlipidemia     Hypertension      Social History:    Social History     Tobacco Use   Smoking Status Former    Packs/day: 1.00    Years: 0.50    Pack years: 0.50    Types: Cigarettes    Quit date: 1964    Years since quittin.3   Smokeless Tobacco Never   Tobacco Comments    smoked 2-3 months when 19     Current Medications:  Current Outpatient Medications   Medication Sig Dispense Refill    betaxolol (KERLONE) 10 MG tablet TAKE ONE TABLET BY MOUTH TWICE A  tablet 1    nitroGLYCERIN (NITROSTAT) 0.4 MG SL tablet DISSOLVE 1 TAB UNDER TONGUE FOR CHEST PAIN - IF PAIN REMAINS AFTER 5 MIN, CALL 911 AND REPEAT DOSE.  MAX 3 TABS IN 15 MINUTES 25 tablet 2

## 2023-05-03 ENCOUNTER — HOSPITAL ENCOUNTER (OUTPATIENT)
Age: 78
Discharge: HOME | End: 2023-05-03
Payer: MEDICARE

## 2023-05-03 ENCOUNTER — TELEPHONE (OUTPATIENT)
Dept: CARDIOLOGY CLINIC | Age: 78
End: 2023-05-03

## 2023-05-03 DIAGNOSIS — I10 ESSENTIAL HYPERTENSION: ICD-10-CM

## 2023-05-03 DIAGNOSIS — I10 ESSENTIAL HYPERTENSION: Primary | ICD-10-CM

## 2023-05-03 LAB
ANION GAP SERPL CALCULATED.3IONS-SCNC: 16 MMOL/L (ref 3–16)
ANION GAP SERPL CALCULATED.3IONS-SCNC: 22 MMOL/L (ref 3–16)
BUN SERPL-MCNC: 31 MG/DL (ref 7–20)
BUN SERPL-MCNC: 32 MG/DL (ref 7–20)
CALCIUM SERPL-MCNC: 9.6 MG/DL (ref 8.3–10.6)
CALCIUM SERPL-MCNC: 9.6 MG/DL (ref 8.3–10.6)
CHLORIDE SERPL-SCNC: 100 MMOL/L (ref 99–110)
CHLORIDE SERPL-SCNC: 106 MMOL/L (ref 99–110)
CHOLEST SERPL-MCNC: 192 MG/DL (ref 0–199)
CO2 SERPL-SCNC: 12 MMOL/L (ref 21–32)
CO2 SERPL-SCNC: 21 MMOL/L (ref 21–32)
CREAT SERPL-MCNC: 1.4 MG/DL (ref 0.6–1.2)
CREAT SERPL-MCNC: 1.6 MG/DL (ref 0.6–1.2)
GFR SERPLBLD CREATININE-BSD FMLA CKD-EPI: 33 ML/MIN/{1.73_M2}
GFR SERPLBLD CREATININE-BSD FMLA CKD-EPI: 39 ML/MIN/{1.73_M2}
GLUCOSE SERPL-MCNC: 129 MG/DL (ref 70–99)
GLUCOSE SERPL-MCNC: 132 MG/DL (ref 70–99)
HDLC SERPL-MCNC: 53 MG/DL (ref 40–60)
LDLC SERPL CALC-MCNC: ABNORMAL MG/DL
LDLC SERPL-MCNC: 88 MG/DL
MAGNESIUM SERPL-MCNC: 2.1 MG/DL (ref 1.8–2.4)
POTASSIUM SERPL-SCNC: 4.7 MMOL/L (ref 3.5–5.1)
POTASSIUM SERPL-SCNC: 5.2 MMOL/L (ref 3.5–5.1)
SODIUM SERPL-SCNC: 137 MMOL/L (ref 136–145)
SODIUM SERPL-SCNC: 140 MMOL/L (ref 136–145)
TRIGL SERPL-MCNC: 378 MG/DL (ref 0–150)
VLDLC SERPL CALC-MCNC: ABNORMAL MG/DL

## 2023-05-03 PROCEDURE — 36415 COLL VENOUS BLD VENIPUNCTURE: CPT

## 2023-05-03 PROCEDURE — 80048 BASIC METABOLIC PNL TOTAL CA: CPT

## 2023-05-03 NOTE — TELEPHONE ENCOUNTER
Addended by: LORRAINE ENAMORADO on: 8/9/2022 03:51 PM     Modules accepted: Orders     Please use December openings

## 2023-05-03 NOTE — TELEPHONE ENCOUNTER
Patient saw Bety Trimble today and she wants her to see [facility] in 6 mo. Can she be scheduled in Dec or can we use a spot on Nov 29,23. Please advise.   Thank you [facility] PA/Lat

## 2023-05-04 ENCOUNTER — TELEPHONE (OUTPATIENT)
Dept: CARDIOLOGY CLINIC | Age: 78
End: 2023-05-04

## 2023-05-04 NOTE — TELEPHONE ENCOUNTER
----- Message from MARIELENA Schmitz CNP sent at 5/4/2023  6:13 AM EDT -----  Please let patient know her repeat labs look much better. Thanks!

## 2023-05-05 ENCOUNTER — TELEPHONE (OUTPATIENT)
Dept: CARDIOLOGY CLINIC | Age: 78
End: 2023-05-05

## 2023-06-19 NOTE — TELEPHONE ENCOUNTER
Last OV: 5/2/23 NPKA  Next OV: 12/20/23 Chang Herrera  Last labs: LDL 5/2/23  Last EKG: 10/4/22  Last filled:   Disp Refills Start End    rosuvastatin (CRESTOR) 40 MG tablet 30 tablet 9 6/13/2022     Sig: TAKE ONE TABLET BY MOUTH EVERY EVENING    Sent to pharmacy as: Rosuvastatin Calcium 40 MG Oral Tablet (CRESTOR)    Cosign for Ordering: Accepted by Ilsa Kearney MD on 6/13/2022  4:01 PM    E-Prescribing Status: Receipt confirmed by pharmacy (6/13/2022  3:43 PM EDT)      Component Ref Range & Units 5/2/23 1554 9/19/14 1307 12/19/13 1303 9/26/13 1134 11/3/12 1303 9/17/11 1144   LDL Direct <100 mg/dL 88  138 High   161 High   137 High   154 High  R  251 High  R

## 2023-06-21 RX ORDER — ROSUVASTATIN CALCIUM 40 MG/1
TABLET, COATED ORAL
Qty: 30 TABLET | Refills: 9 | Status: SHIPPED | OUTPATIENT
Start: 2023-06-21

## 2023-07-20 ENCOUNTER — HOSPITAL ENCOUNTER (OUTPATIENT)
Dept: NON INVASIVE DIAGNOSTICS | Age: 78
Discharge: HOME | End: 2023-07-20

## 2023-12-12 NOTE — PROGRESS NOTES
[facility] [place]    2024    Deidra Dumont (:  1945) is a 78 y.o. female is here for routine follow up and management of CAD and modifiable risk factors.     Referring Provider: Ilya Lomax MD    HISTORY: Ms Deidra Dumont has a history of CAD (IWMI, PCI to RCA '15), pericarditis and significant GI bleed post intervention. S/p left CEA in 2014. Mild to moderate AI, CKD, osteoarthritis, osteoporosis, and Raynaud's.    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       Today, she reports she has been in the hospital a few times since her last visit. She was admitted to [place] with NSTEMI and reports she received \"3 more\" stents in . Unable to review cath report in care everywhere at this time.     She reports she is feeling better now, no reports of significant chest discomfort or shortness of breath. She would like to go back on her Betaxolol because she felt better on it.     REVIEW OF SYSTEMS:  A complete review of systems has been reviewed and updated today and is negative except as noted in the history of present illness.      Prior to Visit Medications    Medication Sig Taking? Authorizing Provider   clopidogrel (PLAVIX) 75 MG tablet Take 1 tablet by mouth daily Yes Geraldine Peraza MD   betaxolol (KERLONE) 10 MG tablet Take 1 tablet by mouth 2 times daily Yes Toño Lindsey MD   rosuvastatin (CRESTOR) 40 MG tablet TAKE ONE TABLET BY MOUTH EVERY EVENING Yes Krystle Rodriguez APRN - CNP   valACYclovir (VALTREX) 500 MG tablet Take 1 tablet by mouth daily Yes Geraldine Peraza MD   nitroGLYCERIN (NITROSTAT) 0.4 MG SL tablet DISSOLVE 1 TAB UNDER TONGUE FOR CHEST PAIN - IF PAIN REMAINS AFTER 5 MIN, CALL 911 AND REPEAT DOSE. MAX 3 TABS IN 15 MINUTES Yes Krystle Rodriguez APRN - CNP   Cholecalciferol (VITAMIN D-3 PO) Take 2,000 Units by mouth every morning (before breakfast) Yes Geraldine Peraza MD

## 2023-12-27 PROBLEM — I10 ESSENTIAL HYPERTENSION: Status: ACTIVE | Noted: 2020-11-03

## 2023-12-27 NOTE — PATIENT INSTRUCTIONS
We will get the images from your angiogram to look at everything  You can restart the Betaxolol 10 mg bid

## 2024-01-05 ENCOUNTER — OFFICE VISIT (OUTPATIENT)
Dept: CARDIOLOGY CLINIC | Age: 79
End: 2024-01-05

## 2024-01-05 VITALS
HEIGHT: 61 IN | BODY MASS INDEX: 27.94 KG/M2 | SYSTOLIC BLOOD PRESSURE: 110 MMHG | HEART RATE: 100 BPM | OXYGEN SATURATION: 90 % | DIASTOLIC BLOOD PRESSURE: 70 MMHG | WEIGHT: 148 LBS

## 2024-01-05 DIAGNOSIS — E78.2 MIXED HYPERLIPIDEMIA: ICD-10-CM

## 2024-01-05 DIAGNOSIS — I35.1 NONRHEUMATIC AORTIC VALVE INSUFFICIENCY: ICD-10-CM

## 2024-01-05 DIAGNOSIS — I25.83 CORONARY ARTERY DISEASE DUE TO LIPID RICH PLAQUE: Primary | ICD-10-CM

## 2024-01-05 DIAGNOSIS — I25.10 CORONARY ARTERY DISEASE DUE TO LIPID RICH PLAQUE: Primary | ICD-10-CM

## 2024-01-05 DIAGNOSIS — I10 ESSENTIAL HYPERTENSION: ICD-10-CM

## 2024-01-05 RX ORDER — CLOPIDOGREL BISULFATE 75 MG/1
75 TABLET ORAL DAILY
COMMUNITY

## 2024-01-05 RX ORDER — BETAXOLOL 10 MG/1
10 TABLET, FILM COATED ORAL 2 TIMES DAILY
Qty: 180 TABLET | Refills: 3 | Status: SHIPPED | OUTPATIENT
Start: 2024-01-05

## 2024-07-05 ENCOUNTER — OFFICE VISIT (OUTPATIENT)
Dept: CARDIOLOGY CLINIC | Age: 79
End: 2024-07-05
Payer: MEDICARE

## 2024-07-05 VITALS
HEART RATE: 82 BPM | OXYGEN SATURATION: 97 % | WEIGHT: 136 LBS | HEIGHT: 71 IN | SYSTOLIC BLOOD PRESSURE: 96 MMHG | DIASTOLIC BLOOD PRESSURE: 62 MMHG | BODY MASS INDEX: 19.04 KG/M2

## 2024-07-05 DIAGNOSIS — I10 PRIMARY HYPERTENSION: ICD-10-CM

## 2024-07-05 DIAGNOSIS — I25.83 CORONARY ARTERY DISEASE DUE TO LIPID RICH PLAQUE: Primary | ICD-10-CM

## 2024-07-05 DIAGNOSIS — E78.2 MIXED HYPERLIPIDEMIA: ICD-10-CM

## 2024-07-05 DIAGNOSIS — I25.10 CORONARY ARTERY DISEASE DUE TO LIPID RICH PLAQUE: Primary | ICD-10-CM

## 2024-07-05 PROCEDURE — 1036F TOBACCO NON-USER: CPT | Performed by: NURSE PRACTITIONER

## 2024-07-05 PROCEDURE — 3074F SYST BP LT 130 MM HG: CPT | Performed by: NURSE PRACTITIONER

## 2024-07-05 PROCEDURE — G8420 CALC BMI NORM PARAMETERS: HCPCS | Performed by: NURSE PRACTITIONER

## 2024-07-05 PROCEDURE — G8400 PT W/DXA NO RESULTS DOC: HCPCS | Performed by: NURSE PRACTITIONER

## 2024-07-05 PROCEDURE — 1090F PRES/ABSN URINE INCON ASSESS: CPT | Performed by: NURSE PRACTITIONER

## 2024-07-05 PROCEDURE — 99214 OFFICE O/P EST MOD 30 MIN: CPT | Performed by: NURSE PRACTITIONER

## 2024-07-05 PROCEDURE — G8427 DOCREV CUR MEDS BY ELIG CLIN: HCPCS | Performed by: NURSE PRACTITIONER

## 2024-07-05 PROCEDURE — 3078F DIAST BP <80 MM HG: CPT | Performed by: NURSE PRACTITIONER

## 2024-07-05 PROCEDURE — 1123F ACP DISCUSS/DSCN MKR DOCD: CPT | Performed by: NURSE PRACTITIONER

## 2024-07-05 NOTE — PROGRESS NOTES
tenderness. Pulses are present bilaterally.    DATA:    Lab Results   Component Value Date    ALT 33 05/06/2016    AST 38 (H) 05/06/2016    ALKPHOS 44 05/06/2016    BILITOT <0.2 05/06/2016     Lab Results   Component Value Date    CREATININE 1.4 (H) 05/03/2023    BUN 31 (H) 05/03/2023     05/03/2023    K 4.7 05/03/2023     05/03/2023    CO2 21 05/03/2023       Lab Results   Component Value Date    WBC 9.7 05/02/2023    HGB 11.8 (L) 05/02/2023    HCT 37.0 05/02/2023    .0 (H) 05/02/2023     05/02/2023     No components found for: \"CHLPL\"  Lab Results   Component Value Date    TRIG 378 (H) 05/02/2023    TRIG 189 (H) 05/07/2016    TRIG 235 (H) 01/20/2016     Lab Results   Component Value Date    HDL 53 05/02/2023    HDL 42 05/07/2016    HDL 51 01/20/2016       LABS :     3/7/24  Component  Ref Range & Units 4 mo ago   Cholesterol  <200 mg/dL 143   Triglycerides  <150 mg/dL 249 Abnormal    HDL  40 - 60 mg/dL 29 Abnormal    LDL Cholesterol  0 - 99 mg/dL 64   VLDL  0 - 40 mg/dl 50 Abnormal        3/11/24  Component  Ref Range & Units 3 mo ago Comments   Sodium  136 - 145 mmol/L 139    Potassium  3.5 - 5.1 mmol/L 4.0    Chloride  98 - 107 mmol/L 109 Abnormal     CO2  21 - 31 mmol/L 24    Anion Gap  7 - 16 mmol/L 6 Abnormal     BUN  7 - 25 mg/dL 18    Creatinine  0.6 - 1.2 mg/dL 1.07    Glucose  74 - 109 mg/dL 94    Calcium  8.6 - 10.2 mg/dL 9.3    Albumin  3.5 - 5.7 g/dL 2.9 Abnormal     Phosphorus  2.5 - 5.0 mg/dL 3.3    GFR Female  >90 mL/min/1.73m2 53      Radiology Review:  Pertinent images / reports were reviewed as a part of this visit and reveals the following:    Heart cath (July '23):   99 % stenosis of the proximal OM 1 with a ruptured plaque status post PCI and DWAYNE  80% proximal RCA in-stent stenosis, long 70% lesion in proximal large diagonal.     Findings:  Coronary Angiography  Dominance: Right    LM: Patent  LAD: Patent  D1: Very large vessel, with 70% long lesion proximal  LCx:

## 2024-10-07 ENCOUNTER — TELEPHONE (OUTPATIENT)
Dept: CARDIOLOGY CLINIC | Age: 79
End: 2024-10-07

## 2024-11-18 RX ORDER — BETAXOLOL 10 MG/1
10 TABLET, FILM COATED ORAL 2 TIMES DAILY
Qty: 180 TABLET | Refills: 3 | Status: SHIPPED | OUTPATIENT
Start: 2024-11-18

## 2024-11-18 NOTE — TELEPHONE ENCOUNTER
Received refill request for betaxolol 10mg from [facility] pharmacy.     Last OV: 7/5/24    Next OV: 1/6/25    Last Labs:     Last Filled: 1/5/24

## 2024-11-18 NOTE — TELEPHONE ENCOUNTER
Medication Refill    Medication needing refilled:  betaxolol (KERLONE) 10 MG tablet     Dosage of the medication:  1 tablet by mouth 2 times daily     How are you taking this medication (QD, BID, TID, QID, PRN):    30 or 90 day supply called in:90    When will you run out of your medication:      Which Pharmacy are we sending the medication to?:  [facility] [id number] [place], [place] - [address] - P [phone] - F [phone]

## 2024-11-19 ENCOUNTER — TELEPHONE (OUTPATIENT)
Dept: ADMINISTRATIVE | Age: 79
End: 2024-11-19

## 2024-11-19 NOTE — TELEPHONE ENCOUNTER
Submitted PA for Betaxolol HCl 10MG tablets   Via Pending sale to [facility] Key: [id number]  STATUS: PENDING.    Follow up done daily; if no decision with in three days we will refax.  If another three days goes by with no decision will call the insurance for status.

## 2025-01-13 ENCOUNTER — ANCILLARY PROCEDURE (OUTPATIENT)
Dept: CARDIOLOGY CLINIC | Age: 80
End: 2025-01-13
Payer: MEDICARE

## 2025-01-13 ENCOUNTER — OFFICE VISIT (OUTPATIENT)
Dept: CARDIOLOGY CLINIC | Age: 80
End: 2025-01-13
Payer: MEDICARE

## 2025-01-13 VITALS
DIASTOLIC BLOOD PRESSURE: 64 MMHG | HEIGHT: 71 IN | BODY MASS INDEX: 21.9 KG/M2 | SYSTOLIC BLOOD PRESSURE: 110 MMHG | HEART RATE: 60 BPM | WEIGHT: 156.4 LBS

## 2025-01-13 DIAGNOSIS — I47.10 SVT (SUPRAVENTRICULAR TACHYCARDIA): ICD-10-CM

## 2025-01-13 DIAGNOSIS — I20.9 ANGINA PECTORIS (HCC): ICD-10-CM

## 2025-01-13 DIAGNOSIS — I25.83 CORONARY ARTERY DISEASE DUE TO LIPID RICH PLAQUE: ICD-10-CM

## 2025-01-13 DIAGNOSIS — I65.23 ATHEROSCLEROSIS OF BOTH CAROTID ARTERIES: Primary | ICD-10-CM

## 2025-01-13 DIAGNOSIS — I25.10 CORONARY ARTERY DISEASE DUE TO LIPID RICH PLAQUE: ICD-10-CM

## 2025-01-13 DIAGNOSIS — I47.10 SVT (SUPRAVENTRICULAR TACHYCARDIA) (HCC): ICD-10-CM

## 2025-01-13 PROCEDURE — 93242 EXT ECG>48HR<7D RECORDING: CPT | Performed by: INTERNAL MEDICINE

## 2025-01-13 PROCEDURE — 1123F ACP DISCUSS/DSCN MKR DOCD: CPT | Performed by: NURSE PRACTITIONER

## 2025-01-13 PROCEDURE — 93000 ELECTROCARDIOGRAM COMPLETE: CPT | Performed by: NURSE PRACTITIONER

## 2025-01-13 PROCEDURE — 99214 OFFICE O/P EST MOD 30 MIN: CPT | Performed by: NURSE PRACTITIONER

## 2025-01-13 PROCEDURE — 3074F SYST BP LT 130 MM HG: CPT | Performed by: NURSE PRACTITIONER

## 2025-01-13 PROCEDURE — 3078F DIAST BP <80 MM HG: CPT | Performed by: NURSE PRACTITIONER

## 2025-01-13 RX ORDER — LEVOFLOXACIN 500 MG/1
500 TABLET, FILM COATED ORAL DAILY
COMMUNITY
Start: 2025-01-10 | End: 2025-01-15

## 2025-01-13 RX ORDER — VENLAFAXINE 100 MG/1
100 TABLET ORAL DAILY
COMMUNITY

## 2025-01-13 RX ORDER — METHOCARBAMOL 500 MG/1
500 TABLET, FILM COATED ORAL 3 TIMES DAILY
COMMUNITY

## 2025-01-13 RX ORDER — NITROGLYCERIN 0.4 MG/1
TABLET SUBLINGUAL
Qty: 25 TABLET | Refills: 2 | Status: SHIPPED | OUTPATIENT
Start: 2025-01-13

## 2025-01-13 RX ORDER — HYDROCODONE BITARTRATE AND ACETAMINOPHEN 10; 325 MG/1; MG/1
1 TABLET ORAL EVERY 6 HOURS PRN
COMMUNITY

## 2025-01-13 RX ORDER — METOPROLOL SUCCINATE 25 MG/1
25 TABLET, EXTENDED RELEASE ORAL DAILY
COMMUNITY

## 2025-01-13 NOTE — PATIENT INSTRUCTIONS
7 day heart monitor     Nuclear stress test after your heart monitor     Carotid ultrasound    Appt in four weeks

## 2025-01-13 NOTE — NURSING NOTE
Holter requested for pt.  Device was registered and placed.Tutorial given, pt verbalized understanding.    Patch # [id number]

## 2025-01-13 NOTE — PROGRESS NOTES
Education conducted on adverse reactions including bleeding was discussed.    The patient verbalizes understanding not to stop medications without discussing with us.    Discussed exercise  Discussed Low saturated fat diet.     Thank you for allowing to us to participate in the care of Deidra Dumont.    MARIELENA Aguila    Documentation of today's visit sent to PCP  
[id number] Detailed

## 2025-01-15 RX ORDER — BETAXOLOL 10 MG/1
10 TABLET, FILM COATED ORAL 2 TIMES DAILY
Qty: 180 TABLET | Refills: 3 | Status: SHIPPED | OUTPATIENT
Start: 2025-01-15

## 2025-01-15 NOTE — TELEPHONE ENCOUNTER
Medication Refill    Medication needing refilled:  betaxolol (KERLONE) 10 MG   Dosage of the medication:    How are you taking this medication (QD, BID, TID, QID, PRN):1 tablet by mouth 2 times daily     30 or 90 day supply called in:    When will you run out of your medication:    Which Pharmacy are we sending the medication to?:[facility] [id number] [place], [place] - [address] - P [phone] - F [phone]

## 2025-01-15 NOTE — TELEPHONE ENCOUNTER
Requested Prescriptions     Pending Prescriptions Disp Refills    betaxolol (KERLONE) 10 MG tablet 180 tablet 3     Sig: Take 1 tablet by mouth 2 times daily      Last OV:  1/13/2025 NPTS    Next OV: 2/7/2025 NPTS    Last Labs:     Last Filled: 11/18/2024 [facility]

## 2025-01-22 ENCOUNTER — HOSPITAL ENCOUNTER (OUTPATIENT)
Dept: VASCULAR LAB | Age: 80
Discharge: HOME | End: 2025-01-24
Payer: MEDICARE

## 2025-01-22 ENCOUNTER — HOSPITAL ENCOUNTER (OUTPATIENT)
Age: 80
End: 2025-01-22
Payer: MEDICARE

## 2025-01-22 ENCOUNTER — HOSPITAL ENCOUNTER (OUTPATIENT)
Age: 80
Discharge: HOME | End: 2025-01-24
Payer: MEDICARE

## 2025-01-22 DIAGNOSIS — I20.9 ANGINA PECTORIS (HCC): ICD-10-CM

## 2025-01-22 DIAGNOSIS — I25.83 CORONARY ARTERY DISEASE DUE TO LIPID RICH PLAQUE: ICD-10-CM

## 2025-01-22 DIAGNOSIS — I65.23 ATHEROSCLEROSIS OF BOTH CAROTID ARTERIES: Primary | ICD-10-CM

## 2025-01-22 DIAGNOSIS — I65.23 ATHEROSCLEROSIS OF BOTH CAROTID ARTERIES: ICD-10-CM

## 2025-01-22 DIAGNOSIS — I25.10 CORONARY ARTERY DISEASE DUE TO LIPID RICH PLAQUE: ICD-10-CM

## 2025-01-22 LAB
VAS LEFT ARM BP: 132 MMHG
VAS LEFT CCA DIST EDV: 20.3 CM/S
VAS LEFT CCA DIST PSV: 83.4 CM/S
VAS LEFT CCA MID EDV: 32.2 CM/S
VAS LEFT CCA MID PSV: 114 CM/S
VAS LEFT CCA PROX EDV: 21 CM/S
VAS LEFT CCA PROX PSV: 88.3 CM/S
VAS LEFT ECA PSV: 94.6 CM/S
VAS LEFT ICA DIST EDV: 39.2 CM/S
VAS LEFT ICA DIST PSV: 116 CM/S
VAS LEFT ICA MID EDV: 29.2 CM/S
VAS LEFT ICA MID PSV: 80.1 CM/S
VAS LEFT ICA PROX EDV: 28.7 CM/S
VAS LEFT ICA PROX PSV: 104 CM/S
VAS LEFT ICA/CCA PSV: 0.91
VAS LEFT SUBCLAVIAN PROX PSV: 152 CM/S
VAS LEFT VERTEBRAL PSV: 77 CM/S
VAS RIGHT ARM BP: 138 MMHG
VAS RIGHT CCA DIST EDV: 19.9 CM/S
VAS RIGHT CCA DIST PSV: 67.7 CM/S
VAS RIGHT CCA MID EDV: 16.2 CM/S
VAS RIGHT CCA MID PSV: 72.7 CM/S
VAS RIGHT CCA PROX EDV: 14.3 CM/S
VAS RIGHT CCA PROX PSV: 66.5 CM/S
VAS RIGHT ECA PSV: 310 CM/S
VAS RIGHT ICA DIST EDV: 31 CM/S
VAS RIGHT ICA DIST PSV: 129 CM/S
VAS RIGHT ICA MID EDV: 41.2 CM/S
VAS RIGHT ICA MID PSV: 199 CM/S
VAS RIGHT ICA PROX EDV: 105 CM/S
VAS RIGHT ICA PROX PSV: 345 CM/S
VAS RIGHT ICA/CCA PSV: 4.75
VAS RIGHT SUBCLAVIAN PROX PSV: 132 CM/S
VAS RIGHT VERTEBRAL PSV: 56 CM/S

## 2025-01-22 PROCEDURE — 93880 EXTRACRANIAL BILAT STUDY: CPT

## 2025-01-22 PROCEDURE — 93880 EXTRACRANIAL BILAT STUDY: CPT | Performed by: INTERNAL MEDICINE

## 2025-01-23 PROCEDURE — 93244 EXT ECG>48HR<7D REV&INTERPJ: CPT | Performed by: INTERNAL MEDICINE

## 2025-01-24 LAB — ECHO BSA: 1.88 M2

## 2025-01-27 ENCOUNTER — TELEPHONE (OUTPATIENT)
Dept: CARDIOLOGY CLINIC | Age: 80
End: 2025-01-27

## 2025-01-27 NOTE — TELEPHONE ENCOUNTER
----- Message from MARIELENA Aguila CNP sent at 1/27/2025  8:47 AM EST -----  Asymptomatic ; already on a beta blocker . No changes    Called and left detailed message about results

## 2025-01-30 ENCOUNTER — HOSPITAL ENCOUNTER (OUTPATIENT)
Dept: CT IMAGING | Age: 80
Discharge: HOME | End: 2025-01-30
Payer: MEDICARE

## 2025-01-30 DIAGNOSIS — I65.23 ATHEROSCLEROSIS OF BOTH CAROTID ARTERIES: ICD-10-CM

## 2025-01-30 PROCEDURE — 70450 CT HEAD/BRAIN W/O DYE: CPT

## 2025-01-30 PROCEDURE — 70496 CT ANGIOGRAPHY HEAD: CPT

## 2025-01-30 PROCEDURE — 6360000004 HC RX CONTRAST MEDICATION: Performed by: NURSE PRACTITIONER

## 2025-01-30 RX ORDER — IOPAMIDOL 755 MG/ML
75 INJECTION, SOLUTION INTRAVASCULAR
Status: COMPLETED | OUTPATIENT
Start: 2025-01-30 | End: 2025-01-30

## 2025-01-30 RX ADMIN — IOPAMIDOL 75 ML: 755 INJECTION, SOLUTION INTRAVENOUS at 14:30

## 2025-01-31 ENCOUNTER — OFFICE VISIT (OUTPATIENT)
Dept: VASCULAR SURGERY | Age: 80
End: 2025-01-31

## 2025-01-31 VITALS
OXYGEN SATURATION: 99 % | HEIGHT: 62 IN | HEART RATE: 57 BPM | BODY MASS INDEX: 27.97 KG/M2 | DIASTOLIC BLOOD PRESSURE: 60 MMHG | WEIGHT: 152 LBS | SYSTOLIC BLOOD PRESSURE: 90 MMHG

## 2025-01-31 DIAGNOSIS — I65.21 CAROTID STENOSIS, RIGHT: Primary | ICD-10-CM

## 2025-01-31 NOTE — PROGRESS NOTES
Family History:        Adopted: Yes       Review of Systems:  A 14 point review of systems was completed. Pertinent positives identified in the HPI, all other review of systems negative.      Physical Examination:    BP 90/60 (Site: Left Upper Arm)   Pulse 57   Ht 1.57 m (5' 1.81\")   Wt 68.9 kg (152 lb)   SpO2 99%   BMI 27.97 kg/m²     Weight - Scale: 68.9 kg (152 lb)       General appearance: NAD  Eyes: PERRLA  Neck: no JVD, no lymphadenopathy.  Respiratory: effort is unlabored, no crackles, wheezes or rubs.  Cardiovascular: regular, no murmur. Right carotid bruit. Well healed left neck incision.   Pulses:    carotid radial   RIGHT 2 1   LEFT 2 2   GI: abdomen soft, nondistended, no organomegaly.  Musculoskeletal: strength and tone normal.  Extremities: warm and pink.  Skin: no dermatitis or ulceration.  Neuro/psychiatric: grossly intact.      MEDICAL DECISION MAKING/TESTING        CTA:  images personally reviewed and interpreted.  High grade proximal LALY stenosis.     Assessment:      Diagnosis Orders   1. Carotid stenosis, right              Recommendations/Plan:  Right Carotid endarterectomy.     The risks of surgery have been explained including but not limited to stroke, TIA, MI, bleeding, clotting, death, cranial nerve injury(vagus,marginal branch of 7th nerve,hypoglossal )with their clinical appearance, hematoma, seroma, infection. Post operative expectations for hospitalization and recovery barring any complication discussed. An educational booklet on carotid surgery was provided.      Eliazar Vaughn MD, FACS

## 2025-02-03 ENCOUNTER — PREP FOR PROCEDURE (OUTPATIENT)
Dept: VASCULAR SURGERY | Age: 80
End: 2025-02-03

## 2025-02-03 DIAGNOSIS — I65.23 CAROTID STENOSIS, BILATERAL: ICD-10-CM

## 2025-02-03 DIAGNOSIS — Z01.818 PREOP TESTING: Primary | ICD-10-CM

## 2025-02-03 NOTE — PROGRESS NOTES
Deidra Dumont    Age 79 y.o.    female    1945    MRN [id number]    2/13/2025  Arrival Time_____________  OR Time____________135 Min     Procedure(s):  RIGHT CAROTID ENDARTERECTOMY                      General   Surgeon(s):  Eliazar Vaughn, MD      DAY ADMIT ___  SDS/OP ___  OUTPT IN BED ___        Phone [phone] (home)                  PCP _____________________ Phone_________________ Epic ( ) Epic CE ( ) Appt ________    NOTES: _________________________________________ Consult/Cardio _______________    ____________________________________________________________________________    ____________________________________________________________________________  PAT APPT DATE:________ TIME: ________  FAXED QAD: _______  (__) H&P w/ Hospitalist    (__) PAT orders in EPIC    (__) Meet with PAT nurse  __________________________________________________________________________  Preop Nurse phone screen complete: _____________  (__) CBC     (__) W/ DIFF ___________  (__) CT CHEST  __________   (__) Hgb A1C    ___________  (__) CHEST X RAY   __________  (__) LIPID PROFILE  ___________  (__) EKG   __________  (__) PT-INR / APTT  ___________  (__) PFT's   __________  (__) BMP   ___________  (__) CAROTIDS  __________  (__) CMP   ___________  (__) VEIN MAPPING  __________  (__) U/A   ___________  ( X ) HISTORY & PHYSICAL __________  (__) URINE C & S  ___________  (__) CARDIAC CLEARANCE __________  (__) U/A W/ FLEX  ___________  (__) PULM. CLEARANCE __________  (__) SERUM PREGNANCY ___________  (__) Preop Orders in EPIC __________  (__) TYPE & SCREEN __________repeat ( ) (__)  __________________ __________  (__) Albumin   ___________  (__)  __________________ __________  (__) TRANSFERRIN  ___________  (__)  __________________ __________  (__) LIVER PROFILE  ___________  (__) URINE PREG DOS __________  (__) MRSA NASAL SWAB ___________  (__) BLOOD SUGAR DOS __________  (__) SED RATE  ___________  (__) OAC

## 2025-02-05 ENCOUNTER — HOSPITAL ENCOUNTER (OUTPATIENT)
Age: 80
Discharge: HOME | End: 2025-02-05
Payer: MEDICARE

## 2025-02-05 LAB
ANION GAP SERPL CALCULATED.3IONS-SCNC: 10 MMOL/L (ref 3–16)
BASOPHILS # BLD: 0.1 K/UL (ref 0–0.2)
BASOPHILS NFR BLD: 1.2 %
BUN SERPL-MCNC: 20 MG/DL (ref 7–20)
CALCIUM SERPL-MCNC: 10 MG/DL (ref 8.3–10.6)
CHLORIDE SERPL-SCNC: 102 MMOL/L (ref 99–110)
CO2 SERPL-SCNC: 21 MMOL/L (ref 21–32)
CREAT SERPL-MCNC: 1 MG/DL (ref 0.6–1.2)
DEPRECATED RDW RBC AUTO: 13.7 % (ref 12.4–15.4)
EOSINOPHIL # BLD: 0.5 K/UL (ref 0–0.6)
EOSINOPHIL NFR BLD: 5.9 %
GFR SERPLBLD CREATININE-BSD FMLA CKD-EPI: 57 ML/MIN/{1.73_M2}
GLUCOSE SERPL-MCNC: 129 MG/DL (ref 70–99)
HCT VFR BLD AUTO: 38.5 % (ref 36–48)
HGB BLD-MCNC: 12.5 G/DL (ref 12–16)
LYMPHOCYTES # BLD: 2.5 K/UL (ref 1–5.1)
LYMPHOCYTES NFR BLD: 27.2 %
MCH RBC QN AUTO: 31.6 PG (ref 26–34)
MCHC RBC AUTO-ENTMCNC: 32.6 G/DL (ref 31–36)
MCV RBC AUTO: 97 FL (ref 80–100)
MONOCYTES # BLD: 1 K/UL (ref 0–1.3)
MONOCYTES NFR BLD: 10.9 %
NEUTROPHILS # BLD: 5 K/UL (ref 1.7–7.7)
NEUTROPHILS NFR BLD: 54.8 %
PLATELET # BLD AUTO: 284 K/UL (ref 135–450)
PMV BLD AUTO: 9.2 FL (ref 5–10.5)
POTASSIUM SERPL-SCNC: 4.4 MMOL/L (ref 3.5–5.1)
RBC # BLD AUTO: 3.97 M/UL (ref 4–5.2)
SODIUM SERPL-SCNC: 133 MMOL/L (ref 136–145)
WBC # BLD AUTO: 9.2 K/UL (ref 4–11)

## 2025-02-05 PROCEDURE — 80048 BASIC METABOLIC PNL TOTAL CA: CPT

## 2025-02-05 PROCEDURE — 36415 COLL VENOUS BLD VENIPUNCTURE: CPT

## 2025-02-05 PROCEDURE — 85025 COMPLETE CBC W/AUTO DIFF WBC: CPT

## 2025-02-05 RX ORDER — FERROUS SULFATE 325(65) MG
325 TABLET, DELAYED RELEASE (ENTERIC COATED) ORAL
Status: ON HOLD | COMMUNITY
End: 2025-02-15

## 2025-02-05 NOTE — PROGRESS NOTES
Surgery Date and Time: 2/13/25 0945 am    Arrival Time: 0745 am        The instructions given when and if a patient needs to stop oral intake prior to surgery varies. Follow the instructions you were      given by your Surgeon or RN during the Pre-op call.      __X__Do not eat or drink anything after Midnight the night before the surgery. NO gum, mints, candy or ice chips the day of surgery.               Only take the following medications with a small sip of water the morning of surgery: betaxolol and aspirin              Aspirin, Ibuprofen, Advil, Naproxen, Vitamin E and other Anti-inflammatory products and supplements should be stopped       for 5 -7days before surgery or as directed by your physician. Continue aspirin and plavix per Dr. Vaughn         Check with your Surgeon/PCP/Cardiologist regarding stopping Plavix, Coumadin, Eliquis, Lovenox, Effient, Pradaxa, Xarelto, Fragmin or        other blood thinners and follow their instructions.  Medication:  Plavix         Last dose: Do not stop prior to surgery per Dr. Vaguhn                - If you take a long acting-insulin, take your normal dose the night before surgery & half the dose if taken in the morning.     - Do not smoke or vape, and do not drink any alcoholic beverages 24 hours prior to surgery, this includes NA Beer. Refrain from using     any recreational drugs, including non-prescribed prescription drugs.     -You may brush your teeth and gargle the morning of surgery.  DO NOT SWALLOW WATER.    -You MUST plan for a responsible adult to stay on site while you are here and take you home after your surgery. You will not be allowed                to leave alone or drive yourself home. It is requested someone stay with you the first 24 hrs. Your surgery will be cancelled if you do not                have a ride home with a responsible adult.    -A parent/legal guardian must accompany a child scheduled for surgery and plan to stay at the hospital  until the child                is discharged. Please do not bring other children with you.    -Please wear simple, loose-fitting clothing to the hospital. Do not bring valuables (money, credit cards, checkbooks, etc.)                Do not wear any makeup (including no eye makeup) and no nail polish if applicable.             - DO NOT wear any jewelry or body piercings day of surgery.  All body piercing jewelry must be removed.             - If you have dentures they will be removed before going to the OR; we will provide a container.  If you wear contact lenses                or glasses they will be removed, bring a case for them or wear glasses day of surgery.             - You may need cardiac clearance               if you see a cardiologist, check with PCP or surgeon.              -If you have a Living Will and Durable Power of  for Healthcare, please bring in a copy to be scanned at registration.   -Notify your Surgeon if you develop any illness between now and the surgery date, cough, cold, fever, sore throat,     nausea, vomiting, etc.  Please notify your  surgeon if you experience dizziness, shortness of breath or blurred vision     between now & the time of your surgery.              -DO NOT shave your operative site less than 4 days prior to surgery. For face & neck surgery, men may use an electric                razor up to 2 days prior to surgery.   -To help prevent infection, change your sheets the night before surgery. Shower the night before and morning of surgery    using antibacterial soap (Dial or Safeguard) or Hibiclens soap as instructed by your surgeon.     - Do not apply lotion after shower or day of surgery.              -To provide excellent care visitors will be limited to two per room at any given time.              -Please bring your picture ID and insurance card for registration prior to arriving to second floor surgery department.              -If you use a CPAP/BiPAP please  bring with you on the day of the surgery. If you use oxygen, please bring portable     tank with you.              -For your convenience [facility] has an outpatient pharmacy on site to fill your prescriptions prior to 5 pm.              -Bring a complete list of all your medications with name and dose including any supplements.              Visitor policy: May have 2-3 visitors in Surgery Waiting Room. Visiting hours are 8a-8p. Overnight visitors will be at the discretion of    the unit nurse.  No visitors under the age of 14 permitted.     *Please call [facility] Preadmission Testing Department for any further questions  [phone]      [facility] - MAIN ENTRANCE   [address] [place] [address]   [phone]          Email sent: 2/5/25

## 2025-02-06 ENCOUNTER — TELEPHONE (OUTPATIENT)
Dept: CARDIOLOGY CLINIC | Age: 80
End: 2025-02-06

## 2025-02-07 NOTE — TELEPHONE ENCOUNTER
Phoned patient and she canceled the 02/07/25 appointment with NPTS and she will call Mon 02/07/25 to reschedule after her surgery on 02/13/25.

## 2025-02-09 RX ORDER — SODIUM CHLORIDE 9 MG/ML
INJECTION, SOLUTION INTRAVENOUS PRN
Status: CANCELLED | OUTPATIENT
Start: 2025-02-09

## 2025-02-09 RX ORDER — SODIUM CHLORIDE 0.9 % (FLUSH) 0.9 %
5-40 SYRINGE (ML) INJECTION EVERY 12 HOURS SCHEDULED
Status: CANCELLED | OUTPATIENT
Start: 2025-02-09

## 2025-02-09 RX ORDER — SODIUM CHLORIDE 0.9 % (FLUSH) 0.9 %
5-40 SYRINGE (ML) INJECTION PRN
Status: CANCELLED | OUTPATIENT
Start: 2025-02-09

## 2025-02-12 ENCOUNTER — ANESTHESIA EVENT (OUTPATIENT)
Dept: OPERATING ROOM | Age: 80
End: 2025-02-12
Payer: MEDICARE

## 2025-02-12 ENCOUNTER — TELEPHONE (OUTPATIENT)
Dept: VASCULAR SURGERY | Age: 80
End: 2025-02-12

## 2025-02-13 ENCOUNTER — ANESTHESIA (OUTPATIENT)
Dept: OPERATING ROOM | Age: 80
End: 2025-02-13
Payer: MEDICARE

## 2025-02-13 ENCOUNTER — HOSPITAL ENCOUNTER (INPATIENT)
Age: 80
LOS: 1 days | Discharge: HOME | DRG: 908 | End: 2025-02-14
Attending: SURGERY | Admitting: SURGERY
Payer: MEDICARE

## 2025-02-13 PROBLEM — I65.21 CAROTID STENOSIS, RIGHT: Status: ACTIVE | Noted: 2025-02-13

## 2025-02-13 PROCEDURE — 3600000004 HC SURGERY LEVEL 4 BASE: Performed by: SURGERY

## 2025-02-13 PROCEDURE — 2500000003 HC RX 250 WO HCPCS: Performed by: SURGERY

## 2025-02-13 PROCEDURE — 2709999900 HC NON-CHARGEABLE SUPPLY: Performed by: SURGERY

## 2025-02-13 PROCEDURE — 2500000003 HC RX 250 WO HCPCS

## 2025-02-13 PROCEDURE — C1768 GRAFT, VASCULAR: HCPCS | Performed by: SURGERY

## 2025-02-13 PROCEDURE — 7100000001 HC PACU RECOVERY - ADDTL 15 MIN: Performed by: SURGERY

## 2025-02-13 PROCEDURE — 7100000000 HC PACU RECOVERY - FIRST 15 MIN: Performed by: SURGERY

## 2025-02-13 PROCEDURE — 6360000002 HC RX W HCPCS: Performed by: SURGERY

## 2025-02-13 PROCEDURE — 35301 RECHANNELING OF ARTERY: CPT | Performed by: SURGERY

## 2025-02-13 PROCEDURE — 3700000001 HC ADD 15 MINUTES (ANESTHESIA): Performed by: SURGERY

## 2025-02-13 PROCEDURE — 2060000000 HC ICU INTERMEDIATE R&B

## 2025-02-13 PROCEDURE — 2580000003 HC RX 258: Performed by: SURGERY

## 2025-02-13 PROCEDURE — 6360000002 HC RX W HCPCS: Performed by: ANESTHESIOLOGY

## 2025-02-13 PROCEDURE — 2580000003 HC RX 258

## 2025-02-13 PROCEDURE — 3700000000 HC ANESTHESIA ATTENDED CARE: Performed by: SURGERY

## 2025-02-13 PROCEDURE — 6370000000 HC RX 637 (ALT 250 FOR IP): Performed by: ANESTHESIOLOGY

## 2025-02-13 PROCEDURE — 3600000014 HC SURGERY LEVEL 4 ADDTL 15MIN: Performed by: SURGERY

## 2025-02-13 PROCEDURE — 6360000002 HC RX W HCPCS

## 2025-02-13 PROCEDURE — 6370000000 HC RX 637 (ALT 250 FOR IP): Performed by: SURGERY

## 2025-02-13 DEVICE — XENOSURE BIOLOGIC PATCH, 0.8CM X 8CM, EIFU
Type: IMPLANTABLE DEVICE | Site: NECK | Status: FUNCTIONAL
Brand: XENOSURE BIOLOGIC PATCH

## 2025-02-13 RX ORDER — LIDOCAINE HYDROCHLORIDE 20 MG/ML
INJECTION, SOLUTION INFILTRATION; PERINEURAL
Status: DISCONTINUED | OUTPATIENT
Start: 2025-02-13 | End: 2025-02-13 | Stop reason: SDUPTHER

## 2025-02-13 RX ORDER — ONDANSETRON 2 MG/ML
INJECTION INTRAMUSCULAR; INTRAVENOUS
Status: DISCONTINUED | OUTPATIENT
Start: 2025-02-13 | End: 2025-02-13 | Stop reason: SDUPTHER

## 2025-02-13 RX ORDER — SODIUM CHLORIDE 9 MG/ML
INJECTION, SOLUTION INTRAVENOUS PRN
Status: DISCONTINUED | OUTPATIENT
Start: 2025-02-13 | End: 2025-02-13 | Stop reason: HOSPADM

## 2025-02-13 RX ORDER — PROMETHAZINE HYDROCHLORIDE 25 MG/1
12.5 TABLET ORAL EVERY 6 HOURS PRN
Status: DISCONTINUED | OUTPATIENT
Start: 2025-02-13 | End: 2025-02-14 | Stop reason: HOSPADM

## 2025-02-13 RX ORDER — METHOCARBAMOL 500 MG/1
500 TABLET, FILM COATED ORAL 3 TIMES DAILY
Status: DISCONTINUED | OUTPATIENT
Start: 2025-02-13 | End: 2025-02-14 | Stop reason: HOSPADM

## 2025-02-13 RX ORDER — ONDANSETRON 2 MG/ML
4 INJECTION INTRAMUSCULAR; INTRAVENOUS
Status: DISCONTINUED | OUTPATIENT
Start: 2025-02-13 | End: 2025-02-13 | Stop reason: HOSPADM

## 2025-02-13 RX ORDER — SODIUM CHLORIDE 0.9 % (FLUSH) 0.9 %
5-40 SYRINGE (ML) INJECTION PRN
Status: DISCONTINUED | OUTPATIENT
Start: 2025-02-13 | End: 2025-02-14 | Stop reason: HOSPADM

## 2025-02-13 RX ORDER — MORPHINE SULFATE 2 MG/ML
2 INJECTION, SOLUTION INTRAMUSCULAR; INTRAVENOUS
Status: DISCONTINUED | OUTPATIENT
Start: 2025-02-13 | End: 2025-02-14 | Stop reason: HOSPADM

## 2025-02-13 RX ORDER — DIPHENHYDRAMINE HYDROCHLORIDE 50 MG/ML
12.5 INJECTION INTRAMUSCULAR; INTRAVENOUS
Status: COMPLETED | OUTPATIENT
Start: 2025-02-13 | End: 2025-02-13

## 2025-02-13 RX ORDER — NALOXONE HYDROCHLORIDE 0.4 MG/ML
INJECTION, SOLUTION INTRAMUSCULAR; INTRAVENOUS; SUBCUTANEOUS PRN
Status: DISCONTINUED | OUTPATIENT
Start: 2025-02-13 | End: 2025-02-13 | Stop reason: HOSPADM

## 2025-02-13 RX ORDER — DEXAMETHASONE SODIUM PHOSPHATE 4 MG/ML
INJECTION, SOLUTION INTRA-ARTICULAR; INTRALESIONAL; INTRAMUSCULAR; INTRAVENOUS; SOFT TISSUE
Status: DISCONTINUED | OUTPATIENT
Start: 2025-02-13 | End: 2025-02-13 | Stop reason: SDUPTHER

## 2025-02-13 RX ORDER — SODIUM CHLORIDE, SODIUM LACTATE, POTASSIUM CHLORIDE, CALCIUM CHLORIDE 600; 310; 30; 20 MG/100ML; MG/100ML; MG/100ML; MG/100ML
INJECTION, SOLUTION INTRAVENOUS
Status: DISCONTINUED | OUTPATIENT
Start: 2025-02-13 | End: 2025-02-13 | Stop reason: SDUPTHER

## 2025-02-13 RX ORDER — NITROGLYCERIN 20 MG/100ML
5 INJECTION INTRAVENOUS CONTINUOUS PRN
Status: DISCONTINUED | OUTPATIENT
Start: 2025-02-13 | End: 2025-02-14 | Stop reason: HOSPADM

## 2025-02-13 RX ORDER — PROPOFOL 10 MG/ML
INJECTION, EMULSION INTRAVENOUS
Status: DISCONTINUED | OUTPATIENT
Start: 2025-02-13 | End: 2025-02-13 | Stop reason: SDUPTHER

## 2025-02-13 RX ORDER — SODIUM CHLORIDE 0.9 % (FLUSH) 0.9 %
5-40 SYRINGE (ML) INJECTION EVERY 12 HOURS SCHEDULED
Status: DISCONTINUED | OUTPATIENT
Start: 2025-02-13 | End: 2025-02-13 | Stop reason: HOSPADM

## 2025-02-13 RX ORDER — SODIUM CHLORIDE 0.9 % (FLUSH) 0.9 %
5-40 SYRINGE (ML) INJECTION PRN
Status: DISCONTINUED | OUTPATIENT
Start: 2025-02-13 | End: 2025-02-13 | Stop reason: HOSPADM

## 2025-02-13 RX ORDER — SODIUM CHLORIDE 0.9 % (FLUSH) 0.9 %
5-40 SYRINGE (ML) INJECTION EVERY 12 HOURS SCHEDULED
Status: DISCONTINUED | OUTPATIENT
Start: 2025-02-13 | End: 2025-02-14 | Stop reason: HOSPADM

## 2025-02-13 RX ORDER — HYDROCODONE BITARTRATE AND ACETAMINOPHEN 5; 325 MG/1; MG/1
1 TABLET ORAL EVERY 4 HOURS PRN
Status: DISCONTINUED | OUTPATIENT
Start: 2025-02-13 | End: 2025-02-14 | Stop reason: HOSPADM

## 2025-02-13 RX ORDER — ONDANSETRON 2 MG/ML
4 INJECTION INTRAMUSCULAR; INTRAVENOUS EVERY 6 HOURS PRN
Status: DISCONTINUED | OUTPATIENT
Start: 2025-02-13 | End: 2025-02-14 | Stop reason: HOSPADM

## 2025-02-13 RX ORDER — MORPHINE SULFATE 4 MG/ML
4 INJECTION, SOLUTION INTRAMUSCULAR; INTRAVENOUS
Status: DISCONTINUED | OUTPATIENT
Start: 2025-02-13 | End: 2025-02-14 | Stop reason: HOSPADM

## 2025-02-13 RX ORDER — FENTANYL CITRATE 50 UG/ML
INJECTION, SOLUTION INTRAMUSCULAR; INTRAVENOUS
Status: DISCONTINUED | OUTPATIENT
Start: 2025-02-13 | End: 2025-02-13 | Stop reason: SDUPTHER

## 2025-02-13 RX ORDER — METOPROLOL SUCCINATE 50 MG/1
100 TABLET, EXTENDED RELEASE ORAL DAILY
Status: DISCONTINUED | OUTPATIENT
Start: 2025-02-13 | End: 2025-02-14 | Stop reason: HOSPADM

## 2025-02-13 RX ORDER — OXYCODONE HYDROCHLORIDE 5 MG/1
5 TABLET ORAL ONCE
Status: COMPLETED | OUTPATIENT
Start: 2025-02-13 | End: 2025-02-13

## 2025-02-13 RX ORDER — BETAXOLOL 10 MG/1
10 TABLET, FILM COATED ORAL 2 TIMES DAILY
Status: DISCONTINUED | OUTPATIENT
Start: 2025-02-13 | End: 2025-02-13 | Stop reason: CLARIF

## 2025-02-13 RX ORDER — OXYCODONE HYDROCHLORIDE 5 MG/1
5 TABLET ORAL PRN
Status: COMPLETED | OUTPATIENT
Start: 2025-02-13 | End: 2025-02-13

## 2025-02-13 RX ORDER — SODIUM CHLORIDE 9 MG/ML
INJECTION, SOLUTION INTRAVENOUS PRN
Status: DISCONTINUED | OUTPATIENT
Start: 2025-02-13 | End: 2025-02-14 | Stop reason: HOSPADM

## 2025-02-13 RX ORDER — PROTAMINE SULFATE 10 MG/ML
INJECTION, SOLUTION INTRAVENOUS
Status: DISCONTINUED | OUTPATIENT
Start: 2025-02-13 | End: 2025-02-13 | Stop reason: SDUPTHER

## 2025-02-13 RX ORDER — ROSUVASTATIN CALCIUM 10 MG/1
40 TABLET, COATED ORAL NIGHTLY
Status: DISCONTINUED | OUTPATIENT
Start: 2025-02-13 | End: 2025-02-14 | Stop reason: HOSPADM

## 2025-02-13 RX ORDER — VENLAFAXINE HYDROCHLORIDE 37.5 MG/1
75 CAPSULE, EXTENDED RELEASE ORAL 2 TIMES DAILY
Status: DISCONTINUED | OUTPATIENT
Start: 2025-02-13 | End: 2025-02-14 | Stop reason: HOSPADM

## 2025-02-13 RX ORDER — OXYCODONE HYDROCHLORIDE 5 MG/1
10 TABLET ORAL PRN
Status: COMPLETED | OUTPATIENT
Start: 2025-02-13 | End: 2025-02-13

## 2025-02-13 RX ORDER — NOREPINEPHRINE BITARTRATE 1 MG/ML
INJECTION, SOLUTION INTRAVENOUS
Status: DISCONTINUED | OUTPATIENT
Start: 2025-02-13 | End: 2025-02-13 | Stop reason: SDUPTHER

## 2025-02-13 RX ORDER — HYDRALAZINE HYDROCHLORIDE 20 MG/ML
10 INJECTION INTRAMUSCULAR; INTRAVENOUS
Status: DISCONTINUED | OUTPATIENT
Start: 2025-02-13 | End: 2025-02-14 | Stop reason: HOSPADM

## 2025-02-13 RX ORDER — NITROGLYCERIN 20 MG/100ML
INJECTION INTRAVENOUS
Status: DISCONTINUED | OUTPATIENT
Start: 2025-02-13 | End: 2025-02-13 | Stop reason: SDUPTHER

## 2025-02-13 RX ORDER — FERROUS SULFATE 325(65) MG
325 TABLET ORAL
Status: DISCONTINUED | OUTPATIENT
Start: 2025-02-14 | End: 2025-02-14 | Stop reason: HOSPADM

## 2025-02-13 RX ORDER — ROCURONIUM BROMIDE 10 MG/ML
INJECTION, SOLUTION INTRAVENOUS
Status: DISCONTINUED | OUTPATIENT
Start: 2025-02-13 | End: 2025-02-13 | Stop reason: SDUPTHER

## 2025-02-13 RX ORDER — PHENYLEPHRINE HCL IN 0.9% NACL 1 MG/10 ML
SYRINGE (ML) INTRAVENOUS
Status: DISCONTINUED | OUTPATIENT
Start: 2025-02-13 | End: 2025-02-13 | Stop reason: SDUPTHER

## 2025-02-13 RX ORDER — HEPARIN SODIUM 1000 [USP'U]/ML
INJECTION, SOLUTION INTRAVENOUS; SUBCUTANEOUS
Status: DISCONTINUED | OUTPATIENT
Start: 2025-02-13 | End: 2025-02-13 | Stop reason: SDUPTHER

## 2025-02-13 RX ORDER — HYDROCODONE BITARTRATE AND ACETAMINOPHEN 5; 325 MG/1; MG/1
2 TABLET ORAL EVERY 4 HOURS PRN
Status: DISCONTINUED | OUTPATIENT
Start: 2025-02-13 | End: 2025-02-14 | Stop reason: HOSPADM

## 2025-02-13 RX ORDER — ASPIRIN 81 MG/1
81 TABLET ORAL EVERY OTHER DAY
Status: DISCONTINUED | OUTPATIENT
Start: 2025-02-15 | End: 2025-02-14 | Stop reason: HOSPADM

## 2025-02-13 RX ORDER — CLOPIDOGREL BISULFATE 75 MG/1
75 TABLET ORAL DAILY
Status: DISCONTINUED | OUTPATIENT
Start: 2025-02-14 | End: 2025-02-14

## 2025-02-13 RX ADMIN — HYDROMORPHONE HYDROCHLORIDE 0.5 MG: 1 INJECTION, SOLUTION INTRAMUSCULAR; INTRAVENOUS; SUBCUTANEOUS at 11:59

## 2025-02-13 RX ADMIN — OXYCODONE HYDROCHLORIDE 5 MG: 5 TABLET ORAL at 12:43

## 2025-02-13 RX ADMIN — NOREPINEPHRINE BITARTRATE 5 MCG: 1 INJECTION INTRAVENOUS at 10:16

## 2025-02-13 RX ADMIN — METOPROLOL SUCCINATE 100 MG: 50 TABLET, EXTENDED RELEASE ORAL at 20:10

## 2025-02-13 RX ADMIN — HYDROMORPHONE HYDROCHLORIDE 0.5 MG: 1 INJECTION, SOLUTION INTRAMUSCULAR; INTRAVENOUS; SUBCUTANEOUS at 11:43

## 2025-02-13 RX ADMIN — DEXMEDETOMIDINE HYDROCHLORIDE 6 MCG: 100 INJECTION, SOLUTION INTRAVENOUS at 11:13

## 2025-02-13 RX ADMIN — VENLAFAXINE HYDROCHLORIDE 75 MG: 37.5 CAPSULE, EXTENDED RELEASE ORAL at 20:07

## 2025-02-13 RX ADMIN — PHENYLEPHRINE HYDROCHLORIDE 50 MCG/MIN: 50 INJECTION INTRAVENOUS at 11:51

## 2025-02-13 RX ADMIN — Medication 100 MCG: at 11:39

## 2025-02-13 RX ADMIN — PROTAMINE SULFATE 25 MG: 10 INJECTION, SOLUTION INTRAVENOUS at 11:00

## 2025-02-13 RX ADMIN — CEFAZOLIN 2000 MG: 2 INJECTION, POWDER, FOR SOLUTION INTRAVENOUS at 10:12

## 2025-02-13 RX ADMIN — PHENYLEPHRINE HYDROCHLORIDE 30 MCG/MIN: 10 INJECTION INTRAVENOUS at 10:09

## 2025-02-13 RX ADMIN — ROCURONIUM BROMIDE 40 MG: 50 INJECTION, SOLUTION INTRAVENOUS at 10:31

## 2025-02-13 RX ADMIN — Medication 10 ML: at 20:11

## 2025-02-13 RX ADMIN — FENTANYL CITRATE 100 MCG: 50 INJECTION, SOLUTION INTRAMUSCULAR; INTRAVENOUS at 10:22

## 2025-02-13 RX ADMIN — SODIUM CHLORIDE, SODIUM LACTATE, POTASSIUM CHLORIDE, AND CALCIUM CHLORIDE: .6; .31; .03; .02 INJECTION, SOLUTION INTRAVENOUS at 11:15

## 2025-02-13 RX ADMIN — Medication 50 MCG: at 10:11

## 2025-02-13 RX ADMIN — Medication 50 MCG: at 10:48

## 2025-02-13 RX ADMIN — FENTANYL CITRATE 100 MCG: 50 INJECTION, SOLUTION INTRAMUSCULAR; INTRAVENOUS at 10:03

## 2025-02-13 RX ADMIN — METHOCARBAMOL 500 MG: 500 TABLET ORAL at 20:06

## 2025-02-13 RX ADMIN — HEPARIN SODIUM 5000 UNITS: 1000 INJECTION INTRAVENOUS; SUBCUTANEOUS at 10:32

## 2025-02-13 RX ADMIN — ROSUVASTATIN CALCIUM 40 MG: 10 TABLET, FILM COATED ORAL at 20:07

## 2025-02-13 RX ADMIN — PROPOFOL 100 MG: 10 INJECTION, EMULSION INTRAVENOUS at 10:03

## 2025-02-13 RX ADMIN — DEXAMETHASONE SODIUM PHOSPHATE 8 MG: 4 INJECTION, SOLUTION INTRAMUSCULAR; INTRAVENOUS at 10:08

## 2025-02-13 RX ADMIN — DIPHENHYDRAMINE HYDROCHLORIDE 12.5 MG: 50 INJECTION INTRAMUSCULAR; INTRAVENOUS at 13:22

## 2025-02-13 RX ADMIN — OXYCODONE 5 MG: 5 TABLET ORAL at 17:38

## 2025-02-13 RX ADMIN — ROCURONIUM BROMIDE 60 MG: 50 INJECTION, SOLUTION INTRAVENOUS at 10:04

## 2025-02-13 RX ADMIN — HYDROCODONE BITARTRATE AND ACETAMINOPHEN 2 TABLET: 5; 325 TABLET ORAL at 19:06

## 2025-02-13 RX ADMIN — NITROGLYCERIN 5 MCG/MIN: 20 INJECTION INTRAVENOUS at 11:12

## 2025-02-13 RX ADMIN — SODIUM CHLORIDE, SODIUM LACTATE, POTASSIUM CHLORIDE, AND CALCIUM CHLORIDE: .6; .31; .03; .02 INJECTION, SOLUTION INTRAVENOUS at 09:57

## 2025-02-13 RX ADMIN — SUGAMMADEX 200 MG: 100 INJECTION, SOLUTION INTRAVENOUS at 11:22

## 2025-02-13 RX ADMIN — Medication 50 MCG: at 10:10

## 2025-02-13 RX ADMIN — LIDOCAINE HYDROCHLORIDE 100 MG: 20 INJECTION, SOLUTION INFILTRATION; PERINEURAL at 10:03

## 2025-02-13 RX ADMIN — Medication 50 MCG: at 10:15

## 2025-02-13 RX ADMIN — ONDANSETRON 4 MG: 2 INJECTION INTRAMUSCULAR; INTRAVENOUS at 10:08

## 2025-02-13 ASSESSMENT — PAIN DESCRIPTION - ORIENTATION
ORIENTATION: RIGHT
ORIENTATION: LOWER;UPPER
ORIENTATION: RIGHT
ORIENTATION: RIGHT

## 2025-02-13 ASSESSMENT — PAIN DESCRIPTION - LOCATION
LOCATION: BACK
LOCATION: NECK
LOCATION: NECK;INCISION

## 2025-02-13 ASSESSMENT — PAIN SCALES - GENERAL
PAINLEVEL_OUTOF10: 2
PAINLEVEL_OUTOF10: 7
PAINLEVEL_OUTOF10: 6
PAINLEVEL_OUTOF10: 7
PAINLEVEL_OUTOF10: 8
PAINLEVEL_OUTOF10: 2
PAINLEVEL_OUTOF10: 2

## 2025-02-13 ASSESSMENT — PAIN DESCRIPTION - DESCRIPTORS: DESCRIPTORS: ACHING

## 2025-02-13 ASSESSMENT — PAIN SCALES - WONG BAKER
WONGBAKER_NUMERICALRESPONSE: NO HURT
WONGBAKER_NUMERICALRESPONSE: HURTS A LITTLE BIT

## 2025-02-13 ASSESSMENT — PAIN - FUNCTIONAL ASSESSMENT
PAIN_FUNCTIONAL_ASSESSMENT: 0-10
PAIN_FUNCTIONAL_ASSESSMENT: ACTIVITIES ARE NOT PREVENTED

## 2025-02-13 NOTE — BRIEF OP NOTE
Brief Postoperative Note      Patient: Deidra Dumont  YOB: 1945  MRN: [id number]    Date of Procedure: 2/13/2025    Pre-Op Diagnosis Codes:      * Carotid stenosis, bilateral [I65.23]    Post-Op Diagnosis: Same       Procedure(s):  RIGHT CAROTID ENDARTERECTOMY    Surgeon(s):  Eliazar Vaughn MD    Assistant:  Surgical Assistant: Rahat Méndez    Anesthesia: General    Estimated Blood Loss (mL): less than 50     Complications: None    Specimens:   * No specimens in log *    Implants:  Implant Name Type Inv. Item Serial No.  Lot No. LRB No. Used Action   GRAFT VASC W0.8XL8CM THK0.35-0.75MM CAR PERICARD PROC BOV -  Vascular grafts GRAFT VASC W0.8XL8CM THK0.35-0.75MM CAR PERICARD PROC BOV 0000 [facility] [employer] [id number] Right 1 Implanted         Drains:   Closed/Suction Drain Right Neck Bulb (Active)   Site Description Clean, dry & intact 02/13/25 1105       Findings:  Infection Present At Time Of Surgery (PATOS) (choose all levels that have infection present):  No infection present      Electronically signed by Eliazar Vaughn MD on 2/13/2025 at 11:17 AM

## 2025-02-13 NOTE — DISCHARGE INSTRUCTIONS
[facility]        Carotid artery surgery - discharge  The carotid artery brings needed blood to your brain and face. You have one of these arteries on each side of your neck. Carotid artery surgery is a procedure to restore proper blood flow to the brain.    When You're in the Hospital  You had Right carotid artery surgery to restore proper blood flow to your brain. Your surgeon made an incision (cut) in your neck over your carotid artery. A tube was put in place for blood to flow around the blocked area during your surgery. Your surgeon opened your carotid artery and carefully removed plaque from inside it. The surgeon may have placed a stent (a tiny wire mesh tube) in this area to help keep the artery open. Your artery was closed with stitches after the plaque was removed. The skin incision was closed with surgical glue.  During your surgery, your heart and brain activity were monitored closely.    What to Expect at Home  Remove dressing drain dressing on Sunday, February 16, 2025  You should be able to do most of your normal activities within 3 to 4 weeks, with the exception of heavy lifting. You may have a slight neck ache for about 2 weeks. No heavy lifting greater than 15 pounds for 4 weeks.  You may start doing everyday activities as soon as you feel up to it. You may need help with meals, taking care of the house, and shopping at first.  DO NOT drive until your incision is healed, while you are taking pain medication, and you can turn your head without discomfort.  You may have some numbness along your jaw and near your earlobe. This is from the incision. Most of the time, this goes away in 6 to 12 months.    Self-care  You may shower when you get home. It is ok if the surgical glue on your incision gets wet. DO NOT soak, scrub, or have shower water beat directly on the glue. The glue will fall off on its own after about a week.  Look carefully at your incision every day for

## 2025-02-13 NOTE — H&P
I have reviewed the history and physical (See note dated 1/31/2025) and examined the patient and find no relevant changes.   I have reviewed with the patient and/or family the risks, benefits, and alternatives to the procedure.

## 2025-02-13 NOTE — PROGRESS NOTES
Patient arrived to PACU bay 2, phase one initiated. Placed on bedside monitor, VSS. Report obtained from OR RN and anesthesia. Patient on O2 via nasal cannula at 2L. See flowsheets for assessment. Mistral air and warming blanket on. Side rails in place, will monitor patient closely.

## 2025-02-13 NOTE — ANESTHESIA POSTPROCEDURE EVALUATION
Department of Anesthesiology  Postprocedure Note    Patient: Deidra Dumont  MRN: [id number]  YOB: 1945  Date of evaluation: 2/13/2025    Procedure Summary       Date: 02/13/25 Room / Location: [place] [address] (HYBRID) / [facility]    Anesthesia Start: 0957 Anesthesia Stop: 1135    Procedure: RIGHT CAROTID ENDARTERECTOMY (Right: Neck) Diagnosis:       Carotid stenosis, bilateral      (Carotid stenosis, bilateral [I65.23])    Surgeons: Eliazar Vaughn MD Responsible Provider:     Anesthesia Type: general ASA Status: 4            Anesthesia Type: No value filed.    Pedro Phase I: Pedro Score: 8    Pedro Phase II:      Anesthesia Post Evaluation    Comments: Postoperative Anesthesia Note    Name:    Deidra Dumont  MRN:      [id number]    Patient Vitals in the past 12 hrs:  02/13/25 1230, BP:(!) 108/58, Pulse:85, Resp:12, SpO2:95 %  02/13/25 1224, BP:117/65, Pulse:86, Resp:17, SpO2:94 %  02/13/25 1222, BP:(!) 122/58, Pulse:86, Resp:17, SpO2:92 %  02/13/25 1220, Pulse:86, Resp:17, SpO2:96 %  02/13/25 1219, BP:(!) 125/59, Pulse:85, Resp:15, SpO2:94 %  02/13/25 1215, BP:113/70, Temp:97.7 °F (36.5 °C), Pulse:84, Resp:15, SpO2:95 %  02/13/25 1158, Temp:96.8 °F (36 °C), Temp src:Temporal  02/13/25 1155, BP:(!) 160/74, Pulse:79, Resp:14, SpO2:97 %  02/13/25 1154, Pulse:82, Resp:17, SpO2:96 %  02/13/25 1153, Pulse:80, Resp:10, SpO2:98 %  02/13/25 1152, Pulse:80, Resp:12, SpO2:97 %  02/13/25 1151, Pulse:80, Resp:11, SpO2:96 %  02/13/25 1150, BP:(!) 109/55, Pulse:80, Resp:(!) 8, SpO2:98 %  02/13/25 1149, Pulse:77, Resp:20, SpO2:97 %  02/13/25 1148, Pulse:79, Resp:(!) 7, SpO2:96 %  02/13/25 1147, Pulse:80, Resp:(!) 8, SpO2:97 %  02/13/25 1146, Pulse:80, Resp:10, SpO2:98 %  02/13/25 1145, BP:114/64, Pulse:79, Resp:10, SpO2:97 %  02/13/25 1144, Pulse:81, Resp:15, SpO2:98 %  02/13/25 1143, Pulse:80, Resp:(!) 0, SpO2:96 %  02/13/25 1142, Pulse:80, Resp:(!) 7, SpO2:99 %  02/13/25 1141,

## 2025-02-13 NOTE — PROGRESS NOTES
Patient admitted to room 428 from PACU.  Patient oriented to room, call light, bed rails, phone, lights and bathroom.  Patient instructed about the schedule of the day including: vital sign frequency, lab draws, possible tests, frequency of MD and staff rounds, including RN/MD rounding together at bedside, daily weights, and I &O's.  Patient instructed about prescribed diet, how to use 8MENU, and television.   bed alarm in place, patient aware of placement and reason.   Telemetry box 110 in place, patient aware of placement and reason.  Bed locked, in lowest position, side rails up 2/4, call light within reach.  Will continue to monitor.

## 2025-02-13 NOTE — ANESTHESIA PROCEDURE NOTES
Arterial Line:    An arterial line was placed using surface landmarks, in the OR for the following indication(s): continuous blood pressure monitoring and blood sampling needed.    A 20 gauge (size), 1 and 3/8 inch (length), Angiocath (type) catheter was placed, Seldinger technique not used, into the left radial artery, secured by tape.  Anesthesia type: Local    Events:  patient tolerated procedure well with no complications and EBL 0mL.2/13/2025 8:57 AM  Anesthesiologist: Jacqueline Espinosa MD  Performed: Anesthesiologist   Preanesthetic Checklist  Completed: patient identified, IV checked, site marked, risks and benefits discussed, surgical/procedural consents, equipment checked, pre-op evaluation, timeout performed, anesthesia consent given, oxygen available and monitors applied/VS acknowledged

## 2025-02-13 NOTE — PROGRESS NOTES
Santa CISSE NP, aware of pt's arterial and cuff blood pressure. Tristen gtt turned off at 1205. Directed to keep pt's MAP between 65-70 and if needed, start tristen gtt again at lowest rate. Will continue to monitor pt closely.

## 2025-02-14 ENCOUNTER — ANESTHESIA EVENT (OUTPATIENT)
Dept: OPERATING ROOM | Age: 80
End: 2025-02-14
Payer: MEDICARE

## 2025-02-14 ENCOUNTER — HOSPITAL ENCOUNTER (INPATIENT)
Age: 80
LOS: 1 days | Discharge: HOME | DRG: 908 | End: 2025-02-15
Attending: EMERGENCY MEDICINE | Admitting: SURGERY
Payer: MEDICARE

## 2025-02-14 ENCOUNTER — ANESTHESIA (OUTPATIENT)
Dept: OPERATING ROOM | Age: 80
End: 2025-02-14
Payer: MEDICARE

## 2025-02-14 ENCOUNTER — APPOINTMENT (OUTPATIENT)
Dept: CT IMAGING | Age: 80
DRG: 908 | End: 2025-02-14
Attending: SURGERY
Payer: MEDICARE

## 2025-02-14 VITALS
OXYGEN SATURATION: 95 % | BODY MASS INDEX: 29.87 KG/M2 | WEIGHT: 158.2 LBS | TEMPERATURE: 98 F | HEIGHT: 61 IN | SYSTOLIC BLOOD PRESSURE: 120 MMHG | DIASTOLIC BLOOD PRESSURE: 74 MMHG | HEART RATE: 81 BPM | RESPIRATION RATE: 18 BRPM

## 2025-02-14 DIAGNOSIS — S21.109A: Primary | ICD-10-CM

## 2025-02-14 PROBLEM — T14.8XXA HEMATOMA: Status: ACTIVE | Noted: 2025-02-14

## 2025-02-14 PROBLEM — S10.93XA HEMATOMA OF NECK: Status: ACTIVE | Noted: 2025-02-14

## 2025-02-14 LAB
ABO + RH BLD: NORMAL
ALBUMIN SERPL-MCNC: 4.2 G/DL (ref 3.4–5)
ALBUMIN/GLOB SERPL: 1.6 {RATIO} (ref 1.1–2.2)
ALP SERPL-CCNC: 180 U/L (ref 40–129)
ALT SERPL-CCNC: 19 U/L (ref 10–40)
ANION GAP SERPL CALCULATED.3IONS-SCNC: 11 MMOL/L (ref 3–16)
AST SERPL-CCNC: 29 U/L (ref 15–37)
BASOPHILS # BLD: 0 K/UL (ref 0–0.2)
BASOPHILS NFR BLD: 0.3 %
BILIRUB SERPL-MCNC: <0.2 MG/DL (ref 0–1)
BLD GP AB SCN SERPL QL: NORMAL
BUN SERPL-MCNC: 29 MG/DL (ref 7–20)
CALCIUM SERPL-MCNC: 9.6 MG/DL (ref 8.3–10.6)
CHLORIDE SERPL-SCNC: 105 MMOL/L (ref 99–110)
CO2 SERPL-SCNC: 21 MMOL/L (ref 21–32)
CREAT SERPL-MCNC: 1.4 MG/DL (ref 0.6–1.2)
CREAT SERPL-MCNC: 1.5 MG/DL (ref 0.6–1.2)
DEPRECATED RDW RBC AUTO: 13.6 % (ref 12.4–15.4)
DEPRECATED RDW RBC AUTO: 13.7 % (ref 12.4–15.4)
EOSINOPHIL # BLD: 0.2 K/UL (ref 0–0.6)
EOSINOPHIL NFR BLD: 1.8 %
GFR SERPLBLD CREATININE-BSD FMLA CKD-EPI: 35 ML/MIN/{1.73_M2}
GFR SERPLBLD CREATININE-BSD FMLA CKD-EPI: 38 ML/MIN/{1.73_M2}
GLUCOSE SERPL-MCNC: 114 MG/DL (ref 70–99)
HCT VFR BLD AUTO: 30.2 % (ref 36–48)
HCT VFR BLD AUTO: 30.6 % (ref 36–48)
HGB BLD-MCNC: 10 G/DL (ref 12–16)
HGB BLD-MCNC: 10 G/DL (ref 12–16)
INR PPP: 1.01 (ref 0.85–1.15)
LYMPHOCYTES # BLD: 3.5 K/UL (ref 1–5.1)
LYMPHOCYTES NFR BLD: 27.7 %
MCH RBC QN AUTO: 31.8 PG (ref 26–34)
MCH RBC QN AUTO: 31.9 PG (ref 26–34)
MCHC RBC AUTO-ENTMCNC: 32.9 G/DL (ref 31–36)
MCHC RBC AUTO-ENTMCNC: 33 G/DL (ref 31–36)
MCV RBC AUTO: 96.6 FL (ref 80–100)
MCV RBC AUTO: 96.6 FL (ref 80–100)
MONOCYTES # BLD: 1.1 K/UL (ref 0–1.3)
MONOCYTES NFR BLD: 8.4 %
NEUTROPHILS # BLD: 7.8 K/UL (ref 1.7–7.7)
NEUTROPHILS NFR BLD: 61.8 %
NT-PROBNP SERPL-MCNC: 1307 PG/ML (ref 0–449)
PLATELET # BLD AUTO: 230 K/UL (ref 135–450)
PLATELET # BLD AUTO: 259 K/UL (ref 135–450)
PMV BLD AUTO: 8.6 FL (ref 5–10.5)
PMV BLD AUTO: 8.8 FL (ref 5–10.5)
POTASSIUM SERPL-SCNC: 4.4 MMOL/L (ref 3.5–5.1)
PROT SERPL-MCNC: 6.8 G/DL (ref 6.4–8.2)
PROTHROMBIN TIME: 13.5 SEC (ref 11.9–14.9)
RBC # BLD AUTO: 3.13 M/UL (ref 4–5.2)
RBC # BLD AUTO: 3.16 M/UL (ref 4–5.2)
SODIUM SERPL-SCNC: 137 MMOL/L (ref 136–145)
TROPONIN, HIGH SENSITIVITY: 18 NG/L (ref 0–14)
WBC # BLD AUTO: 12.6 K/UL (ref 4–11)
WBC # BLD AUTO: 13.9 K/UL (ref 4–11)

## 2025-02-14 PROCEDURE — 6360000002 HC RX W HCPCS: Performed by: SURGERY

## 2025-02-14 PROCEDURE — 36415 COLL VENOUS BLD VENIPUNCTURE: CPT

## 2025-02-14 PROCEDURE — 84484 ASSAY OF TROPONIN QUANT: CPT

## 2025-02-14 PROCEDURE — 86900 BLOOD TYPING SEROLOGIC ABO: CPT

## 2025-02-14 PROCEDURE — 7100000000 HC PACU RECOVERY - FIRST 15 MIN: Performed by: SURGERY

## 2025-02-14 PROCEDURE — 2500000003 HC RX 250 WO HCPCS: Performed by: ANESTHESIOLOGY

## 2025-02-14 PROCEDURE — 3700000000 HC ANESTHESIA ATTENDED CARE: Performed by: SURGERY

## 2025-02-14 PROCEDURE — 85027 COMPLETE CBC AUTOMATED: CPT

## 2025-02-14 PROCEDURE — 6370000000 HC RX 637 (ALT 250 FOR IP): Performed by: CLINICAL NURSE SPECIALIST

## 2025-02-14 PROCEDURE — 2500000003 HC RX 250 WO HCPCS: Performed by: SURGERY

## 2025-02-14 PROCEDURE — 2709999900 HC NON-CHARGEABLE SUPPLY: Performed by: SURGERY

## 2025-02-14 PROCEDURE — 3600000017 HC SURGERY HYBRID ADDL 15MIN: Performed by: SURGERY

## 2025-02-14 PROCEDURE — 3600000007 HC SURGERY HYBRID BASE: Performed by: SURGERY

## 2025-02-14 PROCEDURE — 2580000003 HC RX 258: Performed by: EMERGENCY MEDICINE

## 2025-02-14 PROCEDURE — 3700000001 HC ADD 15 MINUTES (ANESTHESIA): Performed by: SURGERY

## 2025-02-14 PROCEDURE — 6360000002 HC RX W HCPCS: Performed by: ANESTHESIOLOGY

## 2025-02-14 PROCEDURE — 7100000001 HC PACU RECOVERY - ADDTL 15 MIN: Performed by: SURGERY

## 2025-02-14 PROCEDURE — 93005 ELECTROCARDIOGRAM TRACING: CPT | Performed by: EMERGENCY MEDICINE

## 2025-02-14 PROCEDURE — 70498 CT ANGIOGRAPHY NECK: CPT

## 2025-02-14 PROCEDURE — 2580000003 HC RX 258: Performed by: SURGERY

## 2025-02-14 PROCEDURE — 0W360ZZ CONTROL BLEEDING IN NECK, OPEN APPROACH: ICD-10-PCS | Performed by: SURGERY

## 2025-02-14 PROCEDURE — 2700000000 HC OXYGEN THERAPY PER DAY

## 2025-02-14 PROCEDURE — 83880 ASSAY OF NATRIURETIC PEPTIDE: CPT

## 2025-02-14 PROCEDURE — 6360000004 HC RX CONTRAST MEDICATION: Performed by: CLINICAL NURSE SPECIALIST

## 2025-02-14 PROCEDURE — 35800 EXPLORE NECK VESSELS: CPT | Performed by: SURGERY

## 2025-02-14 PROCEDURE — 99024 POSTOP FOLLOW-UP VISIT: CPT | Performed by: CLINICAL NURSE SPECIALIST

## 2025-02-14 PROCEDURE — 82565 ASSAY OF CREATININE: CPT

## 2025-02-14 PROCEDURE — 80053 COMPREHEN METABOLIC PANEL: CPT

## 2025-02-14 PROCEDURE — 6370000000 HC RX 637 (ALT 250 FOR IP): Performed by: SURGERY

## 2025-02-14 PROCEDURE — G0378 HOSPITAL OBSERVATION PER HR: HCPCS

## 2025-02-14 PROCEDURE — 86850 RBC ANTIBODY SCREEN: CPT

## 2025-02-14 PROCEDURE — 2060000000 HC ICU INTERMEDIATE R&B

## 2025-02-14 PROCEDURE — 85610 PROTHROMBIN TIME: CPT

## 2025-02-14 PROCEDURE — 94761 N-INVAS EAR/PLS OXIMETRY MLT: CPT

## 2025-02-14 PROCEDURE — 03CK0ZZ EXTIRPATION OF MATTER FROM RIGHT INTERNAL CAROTID ARTERY, OPEN APPROACH: ICD-10-PCS | Performed by: SURGERY

## 2025-02-14 PROCEDURE — 85025 COMPLETE CBC W/AUTO DIFF WBC: CPT

## 2025-02-14 PROCEDURE — 86901 BLOOD TYPING SEROLOGIC RH(D): CPT

## 2025-02-14 PROCEDURE — 2580000003 HC RX 258: Performed by: ANESTHESIOLOGY

## 2025-02-14 RX ORDER — ROCURONIUM BROMIDE 10 MG/ML
INJECTION, SOLUTION INTRAVENOUS
Status: DISCONTINUED | OUTPATIENT
Start: 2025-02-14 | End: 2025-02-14 | Stop reason: SDUPTHER

## 2025-02-14 RX ORDER — NALOXONE HYDROCHLORIDE 0.4 MG/ML
INJECTION, SOLUTION INTRAMUSCULAR; INTRAVENOUS; SUBCUTANEOUS PRN
Status: DISCONTINUED | OUTPATIENT
Start: 2025-02-14 | End: 2025-02-14 | Stop reason: HOSPADM

## 2025-02-14 RX ORDER — ROSUVASTATIN CALCIUM 10 MG/1
40 TABLET, COATED ORAL NIGHTLY
Status: DISCONTINUED | OUTPATIENT
Start: 2025-02-14 | End: 2025-02-15 | Stop reason: HOSPADM

## 2025-02-14 RX ORDER — HYDRALAZINE HYDROCHLORIDE 20 MG/ML
10 INJECTION INTRAMUSCULAR; INTRAVENOUS
Status: DISCONTINUED | OUTPATIENT
Start: 2025-02-14 | End: 2025-02-15 | Stop reason: HOSPADM

## 2025-02-14 RX ORDER — FERROUS SULFATE 325(65) MG
325 TABLET ORAL
Status: DISCONTINUED | OUTPATIENT
Start: 2025-02-15 | End: 2025-02-15 | Stop reason: HOSPADM

## 2025-02-14 RX ORDER — ONDANSETRON 2 MG/ML
4 INJECTION INTRAMUSCULAR; INTRAVENOUS EVERY 6 HOURS PRN
Status: DISCONTINUED | OUTPATIENT
Start: 2025-02-14 | End: 2025-02-15 | Stop reason: HOSPADM

## 2025-02-14 RX ORDER — METHOCARBAMOL 500 MG/1
500 TABLET, FILM COATED ORAL 3 TIMES DAILY
Status: DISCONTINUED | OUTPATIENT
Start: 2025-02-15 | End: 2025-02-15 | Stop reason: HOSPADM

## 2025-02-14 RX ORDER — ONDANSETRON 2 MG/ML
INJECTION INTRAMUSCULAR; INTRAVENOUS
Status: DISCONTINUED | OUTPATIENT
Start: 2025-02-14 | End: 2025-02-14 | Stop reason: SDUPTHER

## 2025-02-14 RX ORDER — SODIUM CHLORIDE 0.9 % (FLUSH) 0.9 %
5-40 SYRINGE (ML) INJECTION EVERY 12 HOURS SCHEDULED
Status: DISCONTINUED | OUTPATIENT
Start: 2025-02-14 | End: 2025-02-15 | Stop reason: HOSPADM

## 2025-02-14 RX ORDER — DIPHENHYDRAMINE HCL 25 MG
25 TABLET ORAL ONCE
Status: COMPLETED | OUTPATIENT
Start: 2025-02-14 | End: 2025-02-14

## 2025-02-14 RX ORDER — NITROGLYCERIN 0.4 MG/1
0.4 TABLET SUBLINGUAL EVERY 5 MIN PRN
Status: DISCONTINUED | OUTPATIENT
Start: 2025-02-14 | End: 2025-02-15 | Stop reason: HOSPADM

## 2025-02-14 RX ORDER — MAGNESIUM HYDROXIDE 1200 MG/15ML
LIQUID ORAL CONTINUOUS PRN
Status: DISCONTINUED | OUTPATIENT
Start: 2025-02-14 | End: 2025-02-14 | Stop reason: HOSPADM

## 2025-02-14 RX ORDER — BETAXOLOL 10 MG/1
10 TABLET, FILM COATED ORAL 2 TIMES DAILY
Status: DISCONTINUED | OUTPATIENT
Start: 2025-02-14 | End: 2025-02-15 | Stop reason: HOSPADM

## 2025-02-14 RX ORDER — PHENYLEPHRINE HCL IN 0.9% NACL 1 MG/10 ML
SYRINGE (ML) INTRAVENOUS
Status: DISCONTINUED | OUTPATIENT
Start: 2025-02-14 | End: 2025-02-14 | Stop reason: SDUPTHER

## 2025-02-14 RX ORDER — IOPAMIDOL 755 MG/ML
75 INJECTION, SOLUTION INTRAVASCULAR
Status: COMPLETED | OUTPATIENT
Start: 2025-02-14 | End: 2025-02-14

## 2025-02-14 RX ORDER — ASPIRIN 81 MG/1
81 TABLET ORAL EVERY OTHER DAY
Status: DISCONTINUED | OUTPATIENT
Start: 2025-02-15 | End: 2025-02-15 | Stop reason: HOSPADM

## 2025-02-14 RX ORDER — SODIUM CHLORIDE 9 MG/ML
INJECTION, SOLUTION INTRAVENOUS
Status: DISCONTINUED | OUTPATIENT
Start: 2025-02-14 | End: 2025-02-14 | Stop reason: SDUPTHER

## 2025-02-14 RX ORDER — SODIUM CHLORIDE 0.9 % (FLUSH) 0.9 %
5-40 SYRINGE (ML) INJECTION EVERY 12 HOURS SCHEDULED
Status: DISCONTINUED | OUTPATIENT
Start: 2025-02-14 | End: 2025-02-14 | Stop reason: HOSPADM

## 2025-02-14 RX ORDER — SODIUM CHLORIDE 0.9 % (FLUSH) 0.9 %
5-40 SYRINGE (ML) INJECTION PRN
Status: DISCONTINUED | OUTPATIENT
Start: 2025-02-14 | End: 2025-02-15 | Stop reason: HOSPADM

## 2025-02-14 RX ORDER — SODIUM CHLORIDE 0.9 % (FLUSH) 0.9 %
5-40 SYRINGE (ML) INJECTION PRN
Status: DISCONTINUED | OUTPATIENT
Start: 2025-02-14 | End: 2025-02-14 | Stop reason: HOSPADM

## 2025-02-14 RX ORDER — HYDROCODONE BITARTRATE AND ACETAMINOPHEN 10; 325 MG/1; MG/1
1 TABLET ORAL EVERY 6 HOURS PRN
Status: DISCONTINUED | OUTPATIENT
Start: 2025-02-14 | End: 2025-02-15 | Stop reason: HOSPADM

## 2025-02-14 RX ORDER — SODIUM CHLORIDE 9 MG/ML
INJECTION, SOLUTION INTRAVENOUS PRN
Status: DISCONTINUED | OUTPATIENT
Start: 2025-02-14 | End: 2025-02-14 | Stop reason: HOSPADM

## 2025-02-14 RX ORDER — PROPOFOL 10 MG/ML
INJECTION, EMULSION INTRAVENOUS
Status: DISCONTINUED | OUTPATIENT
Start: 2025-02-14 | End: 2025-02-14 | Stop reason: SDUPTHER

## 2025-02-14 RX ORDER — SODIUM CHLORIDE 9 MG/ML
INJECTION, SOLUTION INTRAVENOUS CONTINUOUS
Status: DISCONTINUED | OUTPATIENT
Start: 2025-02-14 | End: 2025-02-15

## 2025-02-14 RX ORDER — DIPHENHYDRAMINE HYDROCHLORIDE 50 MG/ML
12.5 INJECTION INTRAMUSCULAR; INTRAVENOUS EVERY 6 HOURS PRN
Status: DISCONTINUED | OUTPATIENT
Start: 2025-02-14 | End: 2025-02-14 | Stop reason: HOSPADM

## 2025-02-14 RX ORDER — 0.9 % SODIUM CHLORIDE 0.9 %
500 INTRAVENOUS SOLUTION INTRAVENOUS ONCE
Status: COMPLETED | OUTPATIENT
Start: 2025-02-14 | End: 2025-02-14

## 2025-02-14 RX ORDER — SODIUM CHLORIDE 9 MG/ML
INJECTION, SOLUTION INTRAVENOUS PRN
Status: DISCONTINUED | OUTPATIENT
Start: 2025-02-14 | End: 2025-02-15 | Stop reason: HOSPADM

## 2025-02-14 RX ORDER — PROMETHAZINE HYDROCHLORIDE 25 MG/1
12.5 TABLET ORAL EVERY 6 HOURS PRN
Status: DISCONTINUED | OUTPATIENT
Start: 2025-02-14 | End: 2025-02-15 | Stop reason: HOSPADM

## 2025-02-14 RX ORDER — VENLAFAXINE HYDROCHLORIDE 37.5 MG/1
75 CAPSULE, EXTENDED RELEASE ORAL 2 TIMES DAILY WITH MEALS
Status: DISCONTINUED | OUTPATIENT
Start: 2025-02-15 | End: 2025-02-15 | Stop reason: HOSPADM

## 2025-02-14 RX ORDER — ONDANSETRON 2 MG/ML
4 INJECTION INTRAMUSCULAR; INTRAVENOUS
Status: DISCONTINUED | OUTPATIENT
Start: 2025-02-14 | End: 2025-02-14 | Stop reason: HOSPADM

## 2025-02-14 RX ADMIN — METHOCARBAMOL 500 MG: 500 TABLET ORAL at 13:52

## 2025-02-14 RX ADMIN — Medication 50 MCG: at 19:55

## 2025-02-14 RX ADMIN — IOPAMIDOL 75 ML: 755 INJECTION, SOLUTION INTRAVENOUS at 14:21

## 2025-02-14 RX ADMIN — Medication 100 MCG: at 19:43

## 2025-02-14 RX ADMIN — ROCURONIUM BROMIDE 50 MG: 50 INJECTION, SOLUTION INTRAVENOUS at 19:29

## 2025-02-14 RX ADMIN — ONDANSETRON 4 MG: 2 INJECTION INTRAMUSCULAR; INTRAVENOUS at 20:10

## 2025-02-14 RX ADMIN — CLOPIDOGREL BISULFATE 75 MG: 75 TABLET ORAL at 08:09

## 2025-02-14 RX ADMIN — Medication 100 MCG: at 19:49

## 2025-02-14 RX ADMIN — DIPHENHYDRAMINE HYDROCHLORIDE 25 MG: 25 TABLET ORAL at 10:25

## 2025-02-14 RX ADMIN — METHOCARBAMOL 500 MG: 500 TABLET ORAL at 08:09

## 2025-02-14 RX ADMIN — HYDROMORPHONE HYDROCHLORIDE 0.25 MG: 1 INJECTION, SOLUTION INTRAMUSCULAR; INTRAVENOUS; SUBCUTANEOUS at 21:57

## 2025-02-14 RX ADMIN — HYDROMORPHONE HYDROCHLORIDE 0.25 MG: 1 INJECTION, SOLUTION INTRAMUSCULAR; INTRAVENOUS; SUBCUTANEOUS at 20:39

## 2025-02-14 RX ADMIN — HYDROMORPHONE HYDROCHLORIDE 0.25 MG: 1 INJECTION, SOLUTION INTRAMUSCULAR; INTRAVENOUS; SUBCUTANEOUS at 21:18

## 2025-02-14 RX ADMIN — HYDROCODONE BITARTRATE AND ACETAMINOPHEN 2 TABLET: 5; 325 TABLET ORAL at 12:11

## 2025-02-14 RX ADMIN — SUGAMMADEX 200 MG: 100 INJECTION, SOLUTION INTRAVENOUS at 20:10

## 2025-02-14 RX ADMIN — PROPOFOL 120 MG: 10 INJECTION, EMULSION INTRAVENOUS at 19:29

## 2025-02-14 RX ADMIN — HYDROCODONE BITARTRATE AND ACETAMINOPHEN 1 TABLET: 10; 325 TABLET ORAL at 23:15

## 2025-02-14 RX ADMIN — HYDROCODONE BITARTRATE AND ACETAMINOPHEN 2 TABLET: 5; 325 TABLET ORAL at 00:49

## 2025-02-14 RX ADMIN — Medication 10 ML: at 08:12

## 2025-02-14 RX ADMIN — FERROUS SULFATE TAB 325 MG (65 MG ELEMENTAL FE) 325 MG: 325 (65 FE) TAB at 08:09

## 2025-02-14 RX ADMIN — CEFAZOLIN 2000 MG: 2 INJECTION, POWDER, FOR SOLUTION INTRAVENOUS at 19:37

## 2025-02-14 RX ADMIN — METOPROLOL SUCCINATE 100 MG: 50 TABLET, EXTENDED RELEASE ORAL at 08:09

## 2025-02-14 RX ADMIN — SODIUM CHLORIDE 500 ML: 9 INJECTION, SOLUTION INTRAVENOUS at 18:58

## 2025-02-14 RX ADMIN — VENLAFAXINE HYDROCHLORIDE 75 MG: 37.5 CAPSULE, EXTENDED RELEASE ORAL at 08:09

## 2025-02-14 RX ADMIN — SODIUM CHLORIDE: 9 INJECTION, SOLUTION INTRAVENOUS at 20:01

## 2025-02-14 RX ADMIN — SODIUM CHLORIDE: 9 INJECTION, SOLUTION INTRAVENOUS at 19:29

## 2025-02-14 RX ADMIN — Medication 50 MCG: at 19:59

## 2025-02-14 RX ADMIN — ROSUVASTATIN CALCIUM 40 MG: 10 TABLET, FILM COATED ORAL at 23:15

## 2025-02-14 RX ADMIN — Medication 100 MCG: at 19:29

## 2025-02-14 RX ADMIN — SODIUM CHLORIDE: 9 INJECTION, SOLUTION INTRAVENOUS at 22:59

## 2025-02-14 RX ADMIN — DIPHENHYDRAMINE HYDROCHLORIDE 12.5 MG: 50 INJECTION INTRAMUSCULAR; INTRAVENOUS at 02:18

## 2025-02-14 RX ADMIN — Medication 100 MCG: at 19:36

## 2025-02-14 ASSESSMENT — PAIN SCALES - GENERAL
PAINLEVEL_OUTOF10: 3
PAINLEVEL_OUTOF10: 2
PAINLEVEL_OUTOF10: 7
PAINLEVEL_OUTOF10: 8
PAINLEVEL_OUTOF10: 5
PAINLEVEL_OUTOF10: 8
PAINLEVEL_OUTOF10: 5
PAINLEVEL_OUTOF10: 0
PAINLEVEL_OUTOF10: 2
PAINLEVEL_OUTOF10: 8
PAINLEVEL_OUTOF10: 10
PAINLEVEL_OUTOF10: 8

## 2025-02-14 ASSESSMENT — PAIN DESCRIPTION - DESCRIPTORS
DESCRIPTORS: ACHING
DESCRIPTORS: ACHING

## 2025-02-14 ASSESSMENT — ENCOUNTER SYMPTOMS: SHORTNESS OF BREATH: 1

## 2025-02-14 ASSESSMENT — PAIN DESCRIPTION - LOCATION
LOCATION: NECK
LOCATION: HEAD
LOCATION: NECK

## 2025-02-14 ASSESSMENT — PAIN DESCRIPTION - ORIENTATION
ORIENTATION: MID
ORIENTATION: RIGHT

## 2025-02-14 ASSESSMENT — PAIN - FUNCTIONAL ASSESSMENT: PAIN_FUNCTIONAL_ASSESSMENT: ACTIVITIES ARE NOT PREVENTED

## 2025-02-14 ASSESSMENT — PAIN SCALES - WONG BAKER
WONGBAKER_NUMERICALRESPONSE: HURTS A LITTLE BIT
WONGBAKER_NUMERICALRESPONSE: NO HURT

## 2025-02-14 NOTE — PROGRESS NOTES
Patient cleared for discharge home.   Heart monitor disconnected and CMU notified.  Wheeled down to main entrance on wheelchair.

## 2025-02-14 NOTE — FLOWSHEET NOTE
02/14/25 1141   Vital Signs   Temp 98 °F (36.7 °C)   Temp Source Oral   Pulse 81   Heart Rate Source Monitor   /74   MAP (Calculated) 89   MAP (mmHg) 90   BP Location Right upper arm   BP Method Automatic   Patient Position Sitting   Pain Assessment   Pain Assessment 0-10   Pain Level 3   Oxygen Therapy   SpO2 95 %   O2 Device Nasal cannula     Neck swelling-Right side of the Neck up to below the Neck. Reviewed by NP- Awaits Dr. Lima review before discharge.   will check 24-2.

## 2025-02-14 NOTE — DISCHARGE SUMMARY
[facility]     DISCHARGE SUMMARY    Patient ID: Deidra Dumont  MRN:  [id number]  YOB: 1945      Admission Date:  2/13/2025  7:35 AM  Discharge Date:  No discharge date for patient encounter.     Principle Diagnosis:  Carotid stenosis, bilateral    Secondary Diagnosis:  Principal Problem:    Carotid stenosis, bilateral  Active Problems:    Carotid stenosis, right  Resolved Problems:    * No resolved hospital problems. *      Procedure:  Right carotid endarterectomy       History:  The patient is a 79 y.o.   female who had a left carotid endarterectomy in 2014. She has been followed with yearly carotid duplex with the most recent showing high grade LALY stenosis. CTA was performed confirming high grade stenosis. The patient reported some occasional word finding difficulty but no smitha TIA/amaurosis. She was agreeable to proceed with right carotid endarterectomy.      Hospital Course:  The patient underwent elective right carotid endarterectomy on February 13, 2025. Her operative course was uncomplicated. She was extubated prior to transferring to PACU. Her PACU course was stable and she was admitted to PCU for ongoing care. Postoperatively, she had some soft tissue swelling. She started having some pain while swallowing. A CTA of neck                   Significant Diagnostic Studies:   CTA of neck 2/14/2025:      Treatments: IV hydration    LABS:  Recent Labs     02/14/25  0446   WBC 13.9*   HGB 10.0*   HCT 30.2*                                                                     Recent Labs     02/14/25  1242   CREATININE 1.5*     Condition at discharge: Stable     Disposition:  home    Physical Exam on discharge day:   /74   Pulse 81   Temp 98 °F (36.7 °C) (Oral)   Resp 18   Ht 1.562 m (5' 1.5\")   Wt 71.8 kg (158 lb 3.2 oz)   SpO2 95%   BMI 29.41 kg/m²     Awake, alert and oriented x 3  Face symmetrical, tongue midline, speech

## 2025-02-14 NOTE — CARE COORDINATION
Case Management Assessment  Initial Evaluation    Date/Time of Evaluation: 2/14/2025 12:16 PM  Assessment Completed by: Estefany Allen RN    If patient is discharged prior to next notation, then this note serves as note for discharge by case management.    Patient Name: Deidra Dumont                   YOB: 1945  Diagnosis: Carotid stenosis, bilateral [I65.23]  Carotid stenosis, right [I65.21]                   Date / Time: 2/13/2025  7:35 AM    Patient Admission Status: Inpatient   Readmission Risk (Low < 19, Mod (19-27), High > 27): Readmission Risk Score: 6.8    Current PCP: Ilya Lomax MD  PCP verified by CM? (P) Yes    Chart Reviewed: Yes      History Provided by: (P) Patient  Patient Orientation: (P) Alert and Oriented    Patient Cognition: (P) Alert    Hospitalization in the last 30 days (Readmission):  No    If yes, Readmission Assessment in CM Navigator will be completed.    Advance Directives:      Code Status: Full Code   Patient's Primary Decision Maker is: (P) Legal Next of Kin    Primary Decision Maker: Nithin Dumont - Spouse - [phone]    Discharge Planning:    Patient lives with: (P) Spouse/Significant Other Type of Home: (P) House  Primary Care Giver: (P) Self  Patient Support Systems include: (P) Spouse/Significant Other   Current Financial resources: (P) Medicare  Current community resources: (P) None  Current services prior to admission: (P) None            Current DME:              Type of Home Care services:  (P) None    ADLS  Prior functional level: (P) Independent in ADLs/IADLs  Current functional level: (P) Independent in ADLs/IADLs    PT AM-PAC:   /24  OT AM-PAC:   /24    Family can provide assistance at DC: (P) Yes  Would you like Case Management to discuss the discharge plan with any other family members/significant others, and if so, who? (P) No  Plans to Return to Present Housing:    Other Identified Issues/Barriers to RETURNING to current housing: no  immediate barriers  Potential Assistance needed at discharge: (P) N/A            Potential DME:    Patient expects to discharge to: (P) House  Plan for transportation at discharge:      Financial    Payor: [facility] MEDICARE / Plan: [facility] MEDICARE COMPLETE / Product Type: *No Product type* /     Does insurance require precert for SNF: Yes    Potential assistance Purchasing Medications: (P) No  Meds-to-Beds request:        [facility] - [facility] [place] - [address] [facility] [address] - P [phone] - F [phone]  [address]  [place] [address]  Phone: [phone] Fax: [phone]    [facility] [id number] - [place], [place] - [address] [facility] -  [phone] - F [phone]  [address]  [facility] [address]  Phone: [phone] Fax: [phone]    [facility] [address] [place], [place] - [address] [facility] -  [phone] - F [phone]  [address] [facility] [address]  Phone: [phone] Fax: [phone]      Notes:    Factors facilitating achievement of predicted outcomes: Family support, Motivated, Cooperative, and Pleasant    Barriers to discharge: clinical improvement     Additional Case Management Notes: Patient from home with spouse and is independent with adl's. She still drives and denies needs for d/c. Home with family once cleared by vascular. S/P Carotid endarterectomy. Has some neck and facial swelling post procedure. CT scan ordered to assesses.     The Plan for Transition of Care is related to the following treatment goals of Carotid stenosis, bilateral [I65.23]  Carotid stenosis, right [I65.21]        The Patient and/or Patient Representative Agree with the Discharge Plan? Yes    Estefany Allen RN  Case Management Department  Ph: [phone] Fax: [phone]

## 2025-02-14 NOTE — ED PROVIDER NOTES
EMERGENCY DEPARTMENT ENCOUNTER        Pt Name: Deidra Dumont  MRN: [id number]  Birthdate 1945  Date of evaluation: 2/14/2025  Provider: Carlos Pinzon MD  PCP: Ilya Lomax MD      CHIEF COMPLAINT       Chief Complaint   Patient presents with    Post-op Problem     Pt had a carotid surgery yesterday that appears to be profusely bleeding at time of triage. MD immediately notified. Pts surgeon immediately notified. OR team immediately notified.        HISTORY OFPRESENT ILLNESS   (Location/Symptom, Timing/Onset, Context/Setting, Quality, Duration, Modifying Factors,Severity)  Note limiting factors.     Deidra Dumont is a 79 y.o. female presenting today due to concern for just being discharged earlier this afternoon with known hematoma but patient reported there was significant bleeding coming from her chest and therefore she drove 30 minutes with her  from [place] to [facility] where her surgery was done for further evaluation.  She does feel slightly lightheaded but denies any syncope.  She has some chest discomfort but denies any shortness of breath.  No reported vomiting.  Due to concern for bleeding from recent surgery, she came to the ED for further evaluation.  She denies any new numbness or weakness in the arms or legs since the surgery.  She is okay having a blood transfusion if needed.  She does report receiving a blood thinner earlier today.     No reported falls at home.     REVIEW OF SYSTEMS    (2-9 systems for level 4, 10 or more for level 5)     Review of Systems   Constitutional:  Positive for fatigue. Negative for fever.   Respiratory:  Negative for shortness of breath.    Cardiovascular:  Positive for chest pain.   Gastrointestinal:  Negative for vomiting.   Musculoskeletal:  Positive for neck pain.   Skin:  Positive for wound.   Neurological:  Positive for light-headedness. Negative for syncope, weakness and headaches.   Hematological:   Chest wall: Tenderness present. No deformity.       Abdominal:      General: Bowel sounds are normal. There is no distension.      Tenderness: There is no abdominal tenderness. There is no guarding or rebound.   Musculoskeletal:      Cervical back: Edema and erythema present. No rigidity or torticollis. No spinous process tenderness. Decreased range of motion.   Skin:     General: Skin is warm and dry.      Coloration: Skin is not jaundiced or pale.      Findings: Bruising present.   Neurological:      Mental Status: She is alert. Mental status is at baseline.      GCS: GCS eye subscore is 4. GCS verbal subscore is 5. GCS motor subscore is 6.      Cranial Nerves: No dysarthria.      Sensory: Sensation is intact.      Motor: Motor function is intact. No weakness, tremor, atrophy, abnormal muscle tone or seizure activity.   Psychiatric:         Attention and Perception: Attention normal.         Mood and Affect: Mood is anxious. Mood is not depressed. Affect is not tearful.         Speech: Speech normal. Speech is not delayed or slurred.         Behavior: Behavior normal. Behavior is cooperative.             DIAGNOSTIC RESULTS   :    Labs Reviewed   CBC WITH AUTO DIFFERENTIAL - Abnormal; Notable for the following components:       Result Value    WBC 12.6 (*)     RBC 3.16 (*)     Hemoglobin 10.0 (*)     Hematocrit 30.6 (*)     Neutrophils Absolute 7.8 (*)     All other components within normal limits   COMPREHENSIVE METABOLIC PANEL - Abnormal; Notable for the following components:    Glucose 114 (*)     BUN 29 (*)     Creatinine 1.4 (*)     Est, Glom Filt Rate 38 (*)     Alkaline Phosphatase 180 (*)     All other components within normal limits   TROPONIN - Abnormal; Notable for the following components:    Troponin, High Sensitivity 18 (*)     All other components within normal limits   BRAIN NATRIURETIC PEPTIDE - Abnormal; Notable for the following components:    NT Pro-BNP 1,307 (*)     All other components within  states he is able to be there in 10 minutes and she needs to go straight to the OR.  He states to call the OR team in.    None    EMERGENCY DEPARTMENT COURSE and DIFFERENTIAL DIAGNOSIS/MDM:   Vitals:    Vitals:    02/15/25 0347 02/15/25 0550 02/15/25 0752 02/15/25 1033   BP: 137/79  107/64    Pulse: 82  80    Resp: 16 18 18 16   Temp: 98.2 °F (36.8 °C)  97.6 °F (36.4 °C)    TempSrc: Oral  Oral    SpO2: 96%  96%    Weight:       Height:           Patient was given the following medications:  Medications   sodium chloride 0.9 % bolus 500 mL (0 mLs IntraVENous Stopped 2/14/25 2336)   ceFAZolin (ANCEF) 2,000 mg in sterile water 20 mL IV syringe (2,000 mg IntraVENous New Bag 2/14/25 1937)     Patient was evaluated due to concern for bleeding from recent chest wound after having right carotid enterectomy yesterday.  She denied any new strokelike symptoms.  No concern for stridor at this point needing emergent airway and since she is going straight to the OR, I do feel that anesthesia can take care of intubation.  Vascular surgery saw the patient at bedside and had no other recommendations.  She is okay with blood transfusion if needed although at this point vitals are stable and no need for transfusion.  She was taken emergently to the OR for further evaluation.  Please see vascular surgery's note for further care of the patient and final disposition.    Exclusion criteria - the patient is NOT to be included for SEP-1 Core Measure due to: Infection is not suspected         I was the primary provider for the patient.      The patient tolerated their visit well.   The patient and / or the family were informed of the results of any tests, a time was given to answer questions.    FINAL IMPRESSION      1. Hemorrhage from open wound of chest wall, unspecified laterality, initial encounter          DISPOSITION/PLAN   DISPOSITION Decision To Admit 02/14/2025 06:48:09 PM   DISPOSITION CONDITION Undetermined           PATIENT

## 2025-02-14 NOTE — PLAN OF CARE
Problem: Discharge Planning  Goal: Discharge to home or other facility with appropriate resources  2/14/2025 0936 by Corey Back RN  Outcome: Progressing  2/13/2025 2026 by Ronan Su RN  Outcome: Progressing  Flowsheets (Taken 2/13/2025 2019)  Discharge to home or other facility with appropriate resources:   Identify barriers to discharge with patient and caregiver   Identify discharge learning needs (meds, wound care, etc)   Refer to discharge planning if patient needs post-hospital services based on physician order or complex needs related to functional status, cognitive ability or social support system   Arrange for needed discharge resources and transportation as appropriate     Problem: Pain  Goal: Verbalizes/displays adequate comfort level or baseline comfort level  2/14/2025 0936 by Corey Back RN  Outcome: Progressing  2/13/2025 2026 by Ronan Su RN  Outcome: Progressing     Problem: Safety - Adult  Goal: Free from fall injury  2/14/2025 0936 by Corey Back RN  Outcome: Progressing  2/13/2025 2026 by Ronan Su RN  Outcome: Progressing     Problem: Chronic Conditions and Co-morbidities  Goal: Patient's chronic conditions and co-morbidity symptoms are monitored and maintained or improved  2/14/2025 0936 by Corey Back RN  Outcome: Progressing  2/13/2025 2026 by Ronan Su RN  Outcome: Progressing     Problem: ABCDS Injury Assessment  Goal: Absence of physical injury  2/14/2025 0936 by Corey Back RN  Outcome: Progressing  2/13/2025 2026 by Ronan Su RN  Outcome: Progressing     Problem: Neurosensory - Adult  Goal: Achieves stable or improved neurological status  Outcome: Progressing  Goal: Absence of seizures  Outcome: Progressing  Goal: Remains free of injury related to seizures activity  Outcome: Progressing  Goal: Achieves maximal functionality and self care  Outcome: Progressing     Problem: Respiratory - Adult  Goal: Achieves optimal ventilation and  Cardiovascular - Adult  Goal: Maintains optimal cardiac output and hemodynamic stability  Outcome: Progressing     Problem: Cardiovascular - Adult  Goal: Absence of cardiac dysrhythmias or at baseline  Outcome: Progressing     Problem: Skin/Tissue Integrity - Adult  Goal: Skin integrity remains intact  Outcome: Progressing     Problem: Skin/Tissue Integrity - Adult  Goal: Incisions, wounds, or drain sites healing without S/S of infection  Outcome: Progressing     Problem: Skin/Tissue Integrity - Adult  Goal: Oral mucous membranes remain intact  Outcome: Progressing     Problem: Musculoskeletal - Adult  Goal: Return mobility to safest level of function  Outcome: Progressing     Problem: Musculoskeletal - Adult  Goal: Maintain proper alignment of affected body part  Outcome: Progressing     Problem: Musculoskeletal - Adult  Goal: Return ADL status to a safe level of function  Outcome: Progressing     Problem: Infection - Adult  Goal: Absence of infection at discharge  Outcome: Progressing     Problem: Genitourinary - Adult  Goal: Absence of urinary retention  Outcome: Progressing     Problem: Genitourinary - Adult  Goal: Urinary catheter remains patent  Outcome: Progressing     Problem: Infection - Adult  Goal: Absence of infection during hospitalization  Outcome: Progressing     Problem: Infection - Adult  Goal: Absence of fever/infection during anticipated neutropenic period  Outcome: Progressing     Problem: Metabolic/Fluid and Electrolytes - Adult  Goal: Electrolytes maintained within normal limits  Outcome: Progressing     Problem: Metabolic/Fluid and Electrolytes - Adult  Goal: Hemodynamic stability and optimal renal function maintained  Outcome: Progressing     Problem: Hematologic - Adult  Goal: Maintains hematologic stability  Outcome: Progressing     Problem: Metabolic/Fluid and Electrolytes - Adult  Goal: Glucose maintained within prescribed range  Outcome: Progressing

## 2025-02-14 NOTE — OP NOTE
[address] [address]                            OPERATIVE REPORT      PATIENT NAME: MICHAEL AL      : 1945  MED REC NO: [id number]                      ROOM: [id number]  ACCOUNT NO: [id number]                       ADMIT DATE: 2025  PROVIDER: Eliazar Vaughn MD      DATE OF PROCEDURE:  2025    SURGEON:  Eliazar Vaughn MD    PREOPERATIVE DIAGNOSIS:  High-grade right carotid stenosis.    POSTOPERATIVE DIAGNOSIS:  High-grade right carotid stenosis.    PROCEDURE:  Right carotid endarterectomy.    ANESTHESIA:  General endotracheal.    INDICATIONS:  The patient is a 79-year-old female with known carotid disease.  She is status post left carotid endarterectomy approximately 10 years ago.  She has been followed with serial carotid duplex examination which showed progressive stenosis now high-grade in the right carotid artery.  CT angiogram was performed confirming the high-grade stenosis.  The patient was brought to the operating room at this time to undergo right carotid endarterectomy.    DESCRIPTION OF PROCEDURE:  The patient was brought to the operating room, placed in supine position.  General endotracheal anesthesia induced.  After adequate anesthesia, the right neck was prepped and draped in sterile fashion.  A small oblique incision was made along the medial border of sternocleidomastoid muscle, carried down through the subcutaneous tissues and platysma using cautery.  Common facial vein was dissected, ligated and divided between 2-0 silk ties and 3-0 silk suture ligatures.  The jugular vein was then retracted laterally exposing the carotid artery.  The proximal common, distal internal, and proximal external carotid arteries were dissected and encircled with vessel loops.  The vagus nerve and hypoglossal nerve were identified and preserved.  The patient was given 5000 units of intravenous heparin.  After  approximately 3 minutes, the proximal common carotid artery was clamped and the vessel loop around the external carotid artery was constricted occluding flow.  The distal internal carotid artery was then punctured with a 20-gauge needle connected to a pressure line allowing measurement of the internal carotid artery stump pressure, which had a mean value in excess of 50 mmHg.  The distal internal carotid artery was then clamped.  A longitudinal arteriotomy was then made starting in the distal common carotid artery extended through the bulb and passed the high-grade stenosis in the internal carotid artery.  Standard endarterectomy was then performed tapering the distal endpoint in the internal carotid artery and performing an eversion endarterectomy on the external branch.  All loose intima and media were removed.  The endarterectomy site was flushed with heparinized saline solution.  The patch closure was then performed using a bovine pericardial patch using running 6-0 Prolene suture.  Just prior to completion of the patch closure, the arteries were back-bled and flushed, and closure was completed and flow was then re-established first into the external carotid artery, then the internal carotid artery.  Intraoperative carotid duplex examination was then performed showing a peak systolic velocity in the common carotid artery at 43 cm/sec, the external carotid artery at 48 cm/sec and the distal internal carotid artery at 63 cm/sec with end-diastolic velocities of 10 cm/sec.  2D imaging showed no evidence of residual stenosis or intimal flaps.  The patient's heparin was then reversed with 25 mg of intravenous protamine.  A piece of thrombin-soaked Gelfoam was then placed over the top of the patch and a 7 flat Gagandeep-Bowman drain was then placed into the operative bed through an inferior stab incision.  The neck was then closed in multiple layers using running 2-0 Vicryl suture, 3-0 Vicryl suture, then running 4-0

## 2025-02-14 NOTE — PROGRESS NOTES
Vascular Surgery Progress Note      POD#  1  S/P Right Carotid Endarterectomy    Chief Complaint: Postop follow up      SUBJECTIVE:  States her neck is puffy. Had some itchiness after Norco overnight (which she takes at home) which has resolved with Benadryl.    OBJECTIVE    Physical  CURRENT VITALS:  /71   Pulse 85   Temp 97.9 °F (36.6 °C) (Oral)   Resp 18   Ht 1.562 m (5' 1.5\")   Wt 71.8 kg (158 lb 3.2 oz)   SpO2 95%   BMI 29.41 kg/m²   24 HR INTAKE/OUTPUT:    Intake/Output Summary (Last 24 hours) at 2/14/2025 0754  Last data filed at 2/14/2025 0652  Gross per 24 hour   Intake 1012.38 ml   Output 165 ml   Net 847.38 ml     UO (purewick):  NR-100  ml /shift   CIRILO:  50-15  ml /shift    Awake, alert and oriented x 3  Face symmetrical, tongue midline, speech clear  Right neck incision soft, puffy as expected given the degree of dissection due to nature of her disease. Incision clean dry and intact with Dermabond/Prineo  CIRILO drain with minimal serosanguinous drainage  Bilateral motor and sensory functions equal and strong    Data  CBC:   Recent Labs     02/14/25  0446   WBC 13.9*   HGB 10.0*   HCT 30.2*   MCV 96.6        Current Inpatient Medications  Current Facility-Administered Medications: diphenhydrAMINE (BENADRYL) injection 12.5 mg, 12.5 mg, IntraVENous, Q6H PRN  [START ON 2/15/2025] aspirin EC tablet 81 mg, 81 mg, Oral, Every Other Day  clopidogrel (PLAVIX) tablet 75 mg, 75 mg, Oral, Daily  ferrous sulfate (IRON 325) tablet 325 mg, 325 mg, Oral, Daily with breakfast  methocarbamol (ROBAXIN) tablet 500 mg, 500 mg, Oral, TID  rosuvastatin (CRESTOR) tablet 40 mg, 40 mg, Oral, Nightly  venlafaxine (EFFEXOR XR) extended release capsule 75 mg, 75 mg, Oral, BID  sodium chloride flush 0.9 % injection 5-40 mL, 5-40 mL, IntraVENous, 2 times per day  sodium chloride flush 0.9 % injection 5-40 mL, 5-40 mL, IntraVENous, PRN  0.9 % sodium chloride infusion, , IntraVENous, PRN  hydrALAZINE (APRESOLINE)  injection 10 mg, 10 mg, IntraVENous, Q1H PRN  nitroGLYCERIN 200 mcg/mL in dextrose 5%, 5 mcg/min, IntraVENous, Continuous PRN  phenylephrine (ARTHUR-SYNEPHRINE) 50 mg in sodium chloride 0.9 % 250 mL infusion,  mcg/min, IntraVENous, Continuous PRN  morphine (PF) injection 2 mg, 2 mg, IntraVENous, Q2H PRN **OR** morphine sulfate (PF) injection 4 mg, 4 mg, IntraVENous, Q2H PRN  promethazine (PHENERGAN) tablet 12.5 mg, 12.5 mg, Oral, Q6H PRN **OR** ondansetron (ZOFRAN) injection 4 mg, 4 mg, IntraVENous, Q6H PRN  HYDROcodone-acetaminophen (NORCO) 5-325 MG per tablet 1 tablet, 1 tablet, Oral, Q4H PRN **OR** HYDROcodone-acetaminophen (NORCO) 5-325 MG per tablet 2 tablet, 2 tablet, Oral, Q4H PRN  metoprolol succinate (TOPROL XL) extended release tablet 100 mg, 100 mg, Oral, Daily    ASSESSMENT  High-grade right carotid stenosis  Previous left carotid stenosis, s/p LCEA  CAD  Hypertension  Hyperlipidemia  Acute blood loss anemia as expected      PLAN  Stable POD #1 RCEA  CIRILO drain removed at bedside and DSD with Tegaderm applied  Ambulate in room  Void check with discontinuation of Purewick  DC to home today. Follow up appt made in 2 weeks, recorded in EPIC.          Santa Redding, APRN  2/14/2025  7:54 AM

## 2025-02-14 NOTE — PROGRESS NOTES
CTA personally reviewed prior to discharge.  Endarterectomy site intact, no deep hematoma.  Hematoma involving SCM muscle.  I am not convinced there is any active extravasation os pseudoaneurysm.  Neck appears stable from when seen in am, no stridor or respiratory issues, trachea midline.  Cleared for discharge, to return if any issues.

## 2025-02-14 NOTE — PLAN OF CARE
Problem: Discharge Planning  Goal: Discharge to home or other facility with appropriate resources  Outcome: Progressing  Flowsheets (Taken 2/13/2025 2019)  Discharge to home or other facility with appropriate resources:   Identify barriers to discharge with patient and caregiver   Identify discharge learning needs (meds, wound care, etc)   Refer to discharge planning if patient needs post-hospital services based on physician order or complex needs related to functional status, cognitive ability or social support system   Arrange for needed discharge resources and transportation as appropriate     Problem: Pain  Goal: Verbalizes/displays adequate comfort level or baseline comfort level  Outcome: Progressing     Problem: Safety - Adult  Goal: Free from fall injury  Outcome: Progressing     Problem: Chronic Conditions and Co-morbidities  Goal: Patient's chronic conditions and co-morbidity symptoms are monitored and maintained or improved  Outcome: Progressing     Problem: ABCDS Injury Assessment  Goal: Absence of physical injury  Outcome: Progressing

## 2025-02-14 NOTE — ED TRIAGE NOTES
Pt arrives via pov from home with profuse neck bleeding. Pt reports she recently had a carotid surgery and she is bleeding from the surgical site. She has c/o throat fullness and a significant headache.     She arrives A&Ox4 and ambulatory.

## 2025-02-14 NOTE — FLOWSHEET NOTE
02/14/25 0753   Vital Signs   Temp 97.8 °F (36.6 °C)   Temp Source Axillary   Pulse 84   Respirations 18   /65   MAP (Calculated) 85   MAP (mmHg) 85   BP Location Right upper arm   BP Method Automatic   Patient Position Semi fowlers   Pain Assessment   Pain Assessment 0-10   Pain Level 5   Pain Location Neck   Oxygen Therapy   SpO2 97 %   O2 Device Nasal cannula

## 2025-02-15 VITALS
HEART RATE: 80 BPM | DIASTOLIC BLOOD PRESSURE: 64 MMHG | WEIGHT: 157.6 LBS | SYSTOLIC BLOOD PRESSURE: 107 MMHG | OXYGEN SATURATION: 96 % | HEIGHT: 61 IN | BODY MASS INDEX: 29.76 KG/M2 | TEMPERATURE: 97.6 F | RESPIRATION RATE: 16 BRPM

## 2025-02-15 LAB
DEPRECATED RDW RBC AUTO: 13.7 % (ref 12.4–15.4)
EKG ATRIAL RATE: 82 BPM
EKG DIAGNOSIS: NORMAL
EKG P AXIS: 71 DEGREES
EKG P-R INTERVAL: 150 MS
EKG Q-T INTERVAL: 370 MS
EKG QRS DURATION: 90 MS
EKG QTC CALCULATION (BAZETT): 432 MS
EKG R AXIS: 18 DEGREES
EKG T AXIS: 69 DEGREES
EKG VENTRICULAR RATE: 82 BPM
HCT VFR BLD AUTO: 26.2 % (ref 36–48)
HGB BLD-MCNC: 8.7 G/DL (ref 12–16)
MCH RBC QN AUTO: 32.2 PG (ref 26–34)
MCHC RBC AUTO-ENTMCNC: 33.1 G/DL (ref 31–36)
MCV RBC AUTO: 97.4 FL (ref 80–100)
PLATELET # BLD AUTO: 185 K/UL (ref 135–450)
PMV BLD AUTO: 8.5 FL (ref 5–10.5)
RBC # BLD AUTO: 2.69 M/UL (ref 4–5.2)
WBC # BLD AUTO: 9.9 K/UL (ref 4–11)

## 2025-02-15 PROCEDURE — G0378 HOSPITAL OBSERVATION PER HR: HCPCS

## 2025-02-15 PROCEDURE — 2500000003 HC RX 250 WO HCPCS: Performed by: SURGERY

## 2025-02-15 PROCEDURE — 93010 ELECTROCARDIOGRAM REPORT: CPT | Performed by: INTERNAL MEDICINE

## 2025-02-15 PROCEDURE — 85027 COMPLETE CBC AUTOMATED: CPT

## 2025-02-15 PROCEDURE — 6370000000 HC RX 637 (ALT 250 FOR IP): Performed by: SURGERY

## 2025-02-15 PROCEDURE — 36415 COLL VENOUS BLD VENIPUNCTURE: CPT

## 2025-02-15 RX ORDER — FERROUS SULFATE 325(65) MG
325 TABLET, DELAYED RELEASE (ENTERIC COATED) ORAL 2 TIMES DAILY
Qty: 90 TABLET | Refills: 3 | Status: SHIPPED
Start: 2025-02-15

## 2025-02-15 RX ADMIN — VENLAFAXINE HYDROCHLORIDE 75 MG: 37.5 CAPSULE, EXTENDED RELEASE ORAL at 08:05

## 2025-02-15 RX ADMIN — ASPIRIN 81 MG: 81 TABLET, COATED ORAL at 08:05

## 2025-02-15 RX ADMIN — HYDROCODONE BITARTRATE AND ACETAMINOPHEN 1 TABLET: 10; 325 TABLET ORAL at 05:20

## 2025-02-15 RX ADMIN — HYDROCODONE BITARTRATE AND ACETAMINOPHEN 1 TABLET: 10; 325 TABLET ORAL at 10:33

## 2025-02-15 RX ADMIN — Medication 10 ML: at 08:07

## 2025-02-15 RX ADMIN — METHOCARBAMOL 500 MG: 500 TABLET ORAL at 08:05

## 2025-02-15 RX ADMIN — FERROUS SULFATE TAB 325 MG (65 MG ELEMENTAL FE) 325 MG: 325 (65 FE) TAB at 08:05

## 2025-02-15 ASSESSMENT — PAIN DESCRIPTION - ORIENTATION
ORIENTATION: MID
ORIENTATION: RIGHT

## 2025-02-15 ASSESSMENT — PAIN SCALES - GENERAL
PAINLEVEL_OUTOF10: 0
PAINLEVEL_OUTOF10: 5
PAINLEVEL_OUTOF10: 8
PAINLEVEL_OUTOF10: 0

## 2025-02-15 ASSESSMENT — ENCOUNTER SYMPTOMS
SHORTNESS OF BREATH: 0
VOMITING: 0

## 2025-02-15 ASSESSMENT — PAIN DESCRIPTION - LOCATION
LOCATION: NECK
LOCATION: NECK

## 2025-02-15 ASSESSMENT — PAIN DESCRIPTION - DESCRIPTORS
DESCRIPTORS: ACHING
DESCRIPTORS: ACHING

## 2025-02-15 ASSESSMENT — PAIN SCALES - WONG BAKER: WONGBAKER_NUMERICALRESPONSE: NO HURT

## 2025-02-15 NOTE — ANESTHESIA PRE PROCEDURE
every other day In am    ProviderGeraldine MD   Multiple Vitamins-Minerals (THERAPEUTIC MULTIVITAMIN-MINERALS) tablet Take 1 tablet by mouth daily    ProviderGeraldine MD       Current medications:    Current Facility-Administered Medications   Medication Dose Route Frequency Provider Last Rate Last Admin   • sodium chloride 0.9 % bolus 500 mL  500 mL IntraVENous Once Carlos Pinzon  mL/hr at 02/14/25 1858 500 mL at 02/14/25 1858     Current Outpatient Medications   Medication Sig Dispense Refill   • Omega-3 Fatty Acids (FISH OIL PO) Take by mouth daily     • ferrous sulfate (FE TABS 325) 325 (65 Fe) MG EC tablet Take 1 tablet by mouth daily (with breakfast)     • betaxolol (KERLONE) 10 MG tablet Take 1 tablet by mouth 2 times daily 180 tablet 3   • methocarbamol (ROBAXIN) 500 MG tablet Take 1 tablet by mouth 3 times daily     • HYDROcodone-acetaminophen (NORCO)  MG per tablet Take 1 tablet by mouth every 6 hours as needed for Pain.     • nitroGLYCERIN (NITROSTAT) 0.4 MG SL tablet DISSOLVE 1 TAB UNDER TONGUE FOR CHEST PAIN - IF PAIN REMAINS AFTER 5 MIN, CALL 911 AND REPEAT DOSE. MAX 3 TABS IN 15 MINUTES (Patient not taking: Reported on 1/13/2025) 25 tablet 2   • venlafaxine (EFFEXOR) 100 MG tablet Take 1 tablet by mouth daily     • rosuvastatin (CRESTOR) 40 MG tablet TAKE ONE TABLET BY MOUTH EVERY EVENING 30 tablet 9   • Cholecalciferol (VITAMIN D-3 PO) Take 2,000 Units by mouth every morning (before breakfast)     • Elastic Bandages & Supports (MEDICAL COMPRESSION STOCKINGS) MISC 1 each by Does not apply route daily as needed (swelling) Knee high 15-20 mmHg (Patient not taking: Reported on 1/13/2025) 1 each 0   • Biotin 1000 MCG TABS Take by mouth Daily     • aspirin 81 MG EC tablet Take 1 tablet by mouth every other day In am     • Multiple Vitamins-Minerals (THERAPEUTIC MULTIVITAMIN-MINERALS) tablet Take 1 tablet by mouth daily         Allergies:    Allergies   Allergen Reactions   •

## 2025-02-15 NOTE — DISCHARGE INSTR - PHARMACY
Discharge order noted iv hub and tele discontinued reviewed instruction with patient and  verbalizes understanding of instruction and follow up care.

## 2025-02-15 NOTE — ED NOTES
Pt requested that  be called so he can go to OR w/ pt. Called  w/ number in chart, verified by pt. No answer. Did not leave a message.  also not in the lobby. Pt edu that if  came back to ER, I would direct him to the OR waiting room.

## 2025-02-15 NOTE — PROGRESS NOTES
Patient states that she is feeling much better this morning. Patient up to the BSC. Patient had 900 ml of urine out. Pain pill given.

## 2025-02-15 NOTE — PROGRESS NOTES
4 Eyes Skin Assessment     NAME:  Deidra Dumont  YOB: 1945  MEDICAL RECORD NUMBER:  [id number]    The patient is being assessed for  Admission    I agree that at least one RN has performed a thorough Head to Toe Skin Assessment on the patient. ALL assessment sites listed below have been assessed.      Areas assessed by both nurses:    Head, Face, Ears, Shoulders, Back, Chest, Arms, Elbows, Hands, Sacrum. Buttock, Coccyx, Ischium, Legs. Feet and Heels, and Under Medical Devices         Does the Patient have a Wound? No noted wound(s)       Derrick Prevention initiated by RN: No  Wound Care Orders initiated by RN: No    Pressure Injury (Stage 3,4, Unstageable, DTI, NWPT, and Complex wounds) if present, place Wound referral order by RN under : No    New Ostomies, if present place, Ostomy referral order under : No     Nurse 1 eSignature: Electronically signed by Jj Smith RN on 2/15/25 at 12:13 AM EST    **SHARE this note so that the co-signing nurse can place an eSignature**    Nurse 2 eSignature: Electronically signed by Jennyfer Bautista RN on 2/15/25 at 3:21 AM EST

## 2025-02-15 NOTE — PROGRESS NOTES
Pt reassessed. C/O sore throat. Sp02 100%. VSS. Minimal blood in drain. Rates pain in neck @ 8/10. Good NVM exam.

## 2025-02-15 NOTE — DISCHARGE INSTRUCTIONS
Your information:  Name: Deidra Dumont  : 1945    Your instructions: Take all your medications as prescribed leave dressing on your drain site for 2 days you may shower    What to do after you leave the hospital:    Recommended diet: regular diet    Recommended activity: no heavy lifting for 4 weeks        The following personal items were collected during your admission and were returned to you:    Belongings  Dental Appliances: Partials, At bedside  Vision - Corrective Lenses: Eyeglasses, At bedside  Hearing Aid: Bilateral hearing aids  Clothing: Gloves, Footwear, Pajamas, Shirt, Shorts, Socks  Jewelry: None  Body Piercings Removed: N/A  Electronic Devices: Cell Phone,   Weapons (Notify Protective Services/Security): None  Home Medications: None  Valuables Given To: Patient  Provide Name(s) of Who Valuable(s) Were Given To: n/a  Responsible person(s) in the waiting room: Nithin  Patient approves for provider to speak to responsible person post operatively: Yes    Information obtained by:  By signing below, I understand that if any problems occur once I leave the hospital I am to contact Dr Vaughn.  I understand and acknowledge receipt of the instructions indicated above.

## 2025-02-15 NOTE — H&P
H and P update.    R CEA yesterday.    Post-op hematoma.   CTA done this afternoon showed neck hematoma- see my previous note.    Discharged home and began bleeding from drain hole.   Hematoma larger but no airway compromise.   Will take to OR for exploration, evacuation hematoma and control bleed which appears to be coming form Sternocleidomastoid muscle not Carotid.   Discussed with patient and  they consent to proceed.

## 2025-02-15 NOTE — DISCHARGE SUMMARY
Physician Discharge Summary     Patient ID:  Deidra Dumont  [id number]  79 y.o.  1945    Admit date: 2/14/2025    Discharge date and time: 2/15/2025 10:45 AM     Admitting Physician: Eliazar Vaughn MD     Discharge Physician: same    Admission Diagnoses: Hematoma [T14.8XXA]    Discharge Diagnoses: same    Admission Condition: good    Discharged Condition: good    Indication for Admission: Admitted with right neck hematoma s/p R CEA.    Hospital Course: Admitted, taken to OR where evacuation of hematoma performed.  Small subcutaneous bleeding vessel noted which was oversewn.  Post-op course unremarkable.       Outstanding Order Results       Date and Time Order Name Status Description    2/14/2025  6:50 PM EKG 12 Lead Preliminary           Disposition: home    Patient Instructions:      Medication List        CHANGE how you take these medications      ferrous sulfate 325 (65 Fe) MG EC tablet  Commonly known as: FE TABS 325  Take 1 tablet by mouth in the morning and at bedtime  What changed: when to take this  Notes to patient: Ferrous Sulfate  Use: treats anemia  Side effects: belly pain, upset stomach, dark stools, or constipation.             CONTINUE taking these medications      aspirin 81 MG EC tablet     betaxolol 10 MG tablet  Commonly known as: KERLONE  Take 1 tablet by mouth 2 times daily     Biotin 1000 MCG Tabs     FISH OIL PO     HYDROcodone-acetaminophen  MG per tablet  Commonly known as: NORCO     Medical Compression Stockings Misc  1 each by Does not apply route daily as needed (swelling) Knee high 15-20 mmHg     methocarbamol 500 MG tablet  Commonly known as: ROBAXIN     rosuvastatin 40 MG tablet  Commonly known as: CRESTOR  TAKE ONE TABLET BY MOUTH EVERY EVENING     therapeutic multivitamin-minerals tablet     VENLAFAXINE HCL PO     VITAMIN D-3 PO            ASK your doctor about these medications      nitroGLYCERIN 0.4 MG SL tablet  Commonly known as: Nitrostat  DISSOLVE 1 TAB

## 2025-02-15 NOTE — BRIEF OP NOTE
Brief Postoperative Note      Patient: Deidra Dumont  YOB: 1945  MRN: [id number]    Date of Procedure: 2/14/2025    Pre-Op Diagnosis Codes:     Right Neck hematoma]    Post-Op Diagnosis: Same       Procedure(s):EVACUATION HEMATOMA    Surgeon(s):  Eliazar Vaughn MD    Assistant:  Surgical Assistant: Dodie Rodriguez    Anesthesia: General    Estimated Blood Loss (mL): less than 50     Complications: None    Specimens:   * No specimens in log *    Implants:  * No implants in log *      Drains:   Closed/Suction Drain Anterior;Right Neck Bulb (Active)       [REMOVED] Closed/Suction Drain Right Neck Bulb (Removed)   Site Description Unable to view 02/14/25 0759   Dressing Status Clean, dry & intact 02/14/25 0759   Drainage Appearance Bright red 02/14/25 0759   Drain Status To bulb suction 02/14/25 0759   Output (ml) 5 ml 02/14/25 0645       [REMOVED] External Urinary Catheter (Removed)   Site Assessment Clean,dry & intact 02/14/25 0652   Placement Replaced 02/14/25 0652   Catheter Care Catheter/Wick replaced;Suction Canister/Tubing changed 02/14/25 0652   Perineal Care Yes 02/14/25 0652   Suction 40 mmgHg continuous 02/14/25 0652   Output (mL) 100 mL 02/14/25 0652       Findings:  Infection Present At Time Of Surgery (PATOS) (choose all levels that have infection present):  No infection present  Other Findings: Small bleeding vessel medial aspect of incision.      Electronically signed by Eliazar Vaughn MD on 2/14/2025 at 8:08 PM

## 2025-02-15 NOTE — ANESTHESIA POSTPROCEDURE EVALUATION
Department of Anesthesiology  Postprocedure Note    Patient: Deidra Dumont  MRN: [id number]  YOB: 1945  Date of evaluation: 2/14/2025    Procedure Summary       Date: 02/14/25 Room / Location: [place] [id number] (HYBRID) / [facility]    Anesthesia Start: 1925 Anesthesia Stop: 2027    Procedure: CAROTID ARTERY EXPLORATION REPAIR, EVACUATION HEMATOMA (Right) Diagnosis:       Dissecting hemorrhage of right carotid artery (HCC)      (Dissecting hemorrhage of right carotid artery (HCC) [I77.71])    Surgeons: Eliazar Vaughn MD Responsible Provider: Tejas Huerta MD    Anesthesia Type: general ASA Status: 4 - Emergent            Anesthesia Type: No value filed.    Pedro Phase I: Pedro Score: 8    Pedro Phase II:      Anesthesia Post Evaluation    Patient location during evaluation: PACU  Patient participation: complete - patient participated  Level of consciousness: awake and alert  Airway patency: patent  Nausea & Vomiting: no nausea and no vomiting  Cardiovascular status: blood pressure returned to baseline  Respiratory status: acceptable  Hydration status: euvolemic  Comments: VSS on transfer to phase 2 recovery.  No anesthetic complications.  Pain management: adequate    No notable events documented.

## 2025-02-15 NOTE — PLAN OF CARE
Problem: Discharge Planning  Goal: Discharge to home or other facility with appropriate resources  Outcome: Progressing     Problem: Safety - Adult  Goal: Free from fall injury  Outcome: Progressing     Problem: Chronic Conditions and Co-morbidities  Goal: Patient's chronic conditions and co-morbidity symptoms are monitored and maintained or improved  Outcome: Progressing     Problem: Pain  Goal: Verbalizes/displays adequate comfort level or baseline comfort level  Outcome: Progressing     Problem: ABCDS Injury Assessment  Goal: Absence of physical injury  Outcome: Progressing

## 2025-02-15 NOTE — PROGRESS NOTES
Vascular Surgery Progress Note      SUBJECTIVE:  No c/o.  Neck feeling better    OBJECTIVE    Physical  CURRENT VITALS:  /64   Pulse 80   Temp 97.6 °F (36.4 °C) (Oral)   Resp 18   Ht 1.562 m (5' 1.5\")   Wt 71.5 kg (157 lb 9.6 oz)   SpO2 96%   BMI 29.30 kg/m²   24 HR INTAKE/OUTPUT:    Intake/Output Summary (Last 24 hours) at 2/15/2025 0819  Last data filed at 2/15/2025 0534  Gross per 24 hour   Intake 2200 ml   Output 930 ml   Net 1270 ml   CIRILO 25cc since OR    Neck bruised but soft.  No recurrence hematoma  Neuro intact  Tongue midline    Data  CBC:   Lab Results   Component Value Date/Time    WBC 9.9 02/15/2025 05:54 AM    RBC 2.69 02/15/2025 05:54 AM    HGB 8.7 02/15/2025 05:54 AM    HCT 26.2 02/15/2025 05:54 AM    MCV 97.4 02/15/2025 05:54 AM    MCH 32.2 02/15/2025 05:54 AM    MCHC 33.1 02/15/2025 05:54 AM    RDW 13.7 02/15/2025 05:54 AM     02/15/2025 05:54 AM    MPV 8.5 02/15/2025 05:54 AM         ASSESSMENT AND PLAN    POD #1 Evacuation hematoma  CIRILO removed.    Acute blood loss anemia   Increase iron supplement to BID    Home this am

## 2025-02-15 NOTE — OP NOTE
[address] [address]                            OPERATIVE REPORT      PATIENT NAME: MICHAEL AL      : 1945  MED REC NO: [id number]                      ROOM: 0441  ACCOUNT NO: [id number]                       ADMIT DATE: 2025  PROVIDER: Eliazar Vaughn MD      DATE OF PROCEDURE:  2025    SURGEON:  Eliazar Vaughn MD    PREOPERATIVE DIAGNOSIS:  Neck hematoma, status post right carotid endarterectomy.    POSTOPERATIVE DIAGNOSIS:  Neck hematoma, status post right carotid endarterectomy.    PROCEDURE:  Right neck exploration with evacuation of hematoma and over-sewing bleeding vessel.    ANESTHESIA:  General endotracheal.    INDICATIONS:  The patient is a 79-year-old female who 1 day prior had undergone carotid endarterectomy.  Postoperatively, she has developed a neck hematoma which has enlarged and it did start bleeding through a prior Gagandeep-Bowman drain hole.  The patient was taken emergently to the operating room for exploration.    DESCRIPTION OF PROCEDURE:  The patient brought to the operating room, placed in supine position.  General endotracheal anesthesia induced.  After adequate anesthesia, the right neck was prepped and draped in a sterile fashion.  The sutures in the prior incision were excised, a large hematoma was noted in the neck, and this was evacuated.  A small bleeding vessel along the medial aspect of the neck was identified.  This was oversewn using a 5-0 Prolene figure-of-eight suture.  The carotid artery and carotid patch were identified and were completely hemostatic without any evidence of bleeding.  As much hematoma as possible was removed.  The neck was then copiously irrigated with antibiotic saline solution.  A 7 flat Gagandeep-Bowman drain was placed into the operative bed through prior inferior stab incision.  The incision was then closed in several layers using running 2-0 Vicryl

## 2025-03-07 ENCOUNTER — OFFICE VISIT (OUTPATIENT)
Dept: VASCULAR SURGERY | Age: 80
End: 2025-03-07

## 2025-03-07 ENCOUNTER — OFFICE VISIT (OUTPATIENT)
Dept: CARDIOLOGY CLINIC | Age: 80
End: 2025-03-07
Payer: MEDICARE

## 2025-03-07 VITALS
WEIGHT: 162 LBS | BODY MASS INDEX: 30.58 KG/M2 | HEIGHT: 61 IN | SYSTOLIC BLOOD PRESSURE: 148 MMHG | DIASTOLIC BLOOD PRESSURE: 78 MMHG

## 2025-03-07 VITALS
HEIGHT: 61 IN | WEIGHT: 159 LBS | SYSTOLIC BLOOD PRESSURE: 120 MMHG | DIASTOLIC BLOOD PRESSURE: 72 MMHG | HEART RATE: 70 BPM | BODY MASS INDEX: 30.02 KG/M2

## 2025-03-07 DIAGNOSIS — Z09 POSTOPERATIVE EXAMINATION: Primary | ICD-10-CM

## 2025-03-07 DIAGNOSIS — I47.10 SVT (SUPRAVENTRICULAR TACHYCARDIA): ICD-10-CM

## 2025-03-07 DIAGNOSIS — E78.2 MIXED HYPERLIPIDEMIA: ICD-10-CM

## 2025-03-07 DIAGNOSIS — I25.83 CORONARY ARTERY DISEASE DUE TO LIPID RICH PLAQUE: Primary | ICD-10-CM

## 2025-03-07 DIAGNOSIS — I25.10 CORONARY ARTERY DISEASE DUE TO LIPID RICH PLAQUE: Primary | ICD-10-CM

## 2025-03-07 DIAGNOSIS — I10 PRIMARY HYPERTENSION: ICD-10-CM

## 2025-03-07 PROCEDURE — 1159F MED LIST DOCD IN RCRD: CPT | Performed by: NURSE PRACTITIONER

## 2025-03-07 PROCEDURE — G8400 PT W/DXA NO RESULTS DOC: HCPCS | Performed by: NURSE PRACTITIONER

## 2025-03-07 PROCEDURE — G2211 COMPLEX E/M VISIT ADD ON: HCPCS | Performed by: NURSE PRACTITIONER

## 2025-03-07 PROCEDURE — G8427 DOCREV CUR MEDS BY ELIG CLIN: HCPCS | Performed by: NURSE PRACTITIONER

## 2025-03-07 PROCEDURE — 3074F SYST BP LT 130 MM HG: CPT | Performed by: NURSE PRACTITIONER

## 2025-03-07 PROCEDURE — G8417 CALC BMI ABV UP PARAM F/U: HCPCS | Performed by: NURSE PRACTITIONER

## 2025-03-07 PROCEDURE — 1090F PRES/ABSN URINE INCON ASSESS: CPT | Performed by: NURSE PRACTITIONER

## 2025-03-07 PROCEDURE — 1123F ACP DISCUSS/DSCN MKR DOCD: CPT | Performed by: NURSE PRACTITIONER

## 2025-03-07 PROCEDURE — 1111F DSCHRG MED/CURRENT MED MERGE: CPT | Performed by: NURSE PRACTITIONER

## 2025-03-07 PROCEDURE — 3078F DIAST BP <80 MM HG: CPT | Performed by: NURSE PRACTITIONER

## 2025-03-07 PROCEDURE — 99214 OFFICE O/P EST MOD 30 MIN: CPT | Performed by: NURSE PRACTITIONER

## 2025-03-07 PROCEDURE — 1036F TOBACCO NON-USER: CPT | Performed by: NURSE PRACTITIONER

## 2025-03-07 RX ORDER — LANOLIN ALCOHOL/MO/W.PET/CERES
50 CREAM (GRAM) TOPICAL DAILY
COMMUNITY

## 2025-03-07 NOTE — PROGRESS NOTES
[facility]     Outpatient Follow Up Note    Deidra Dumont is 79 y.o. female who presents today with a history of CAD s/p PTCA RCA March '09; s/p PTCA RCA Jan '12; IWMI CAD s/p PTCA RCA Aug '15 complicated by pericarditis and significant GIB; s/p PTCA OM July with staged instent stenosis RCA Aug '23;  HTN and hyperlipidemia.     Her other history includes  s/p EPS - RFCA of sinus node for inappropriate sinus tachycardia July '15 ; carotid stenosis s/p Lt CEA '14 Feb '24: presented to ER with CP. Admitted to SNF after a prolonged hospitalization at [facility] with UTI/septic shock/rectal bleeding     Dec '24: altered mental status   ~Acute delirium / acute toxic encephalopathy with agitation most likely due to polypharmacy with , baclofen and Flexeril   Nstemi with troponin of 800/600 likely type II and EKG shows SVT but be due to paroxysmal SVT, echocardiogram EF of 60 to 65% mild AS and AR and consult cardiology recommended no intervention and follow-up as an outpatient  Mild nontraumatic rhabdomyolysis with , CK improved  Paroxysmal SVT /history of SVT s/p sinus node ablation in 2015 on Toprol-XL getting better      Jan '25 ER  chest pain. Patient reports that she was at home earlier today and started develop a pressure sharp pain into the center of her chest that radiated towards her left shoulder. Patient states that when she woke up she checked her blood pressure that was found to have a systolic into the 200s. She states that she is only taking metoprolol for her blood pressure. States that she was on another medicine but the insurance company is no longer paying for it. She does report being here 2 days ago. She admits to having a cardiology appointment tomorrow in [facility]. She otherwise tells myself that the chest pain does seem to be getting better. She took 3 aspirin prior to coming to the emergency department.    On exam patient appears afebrile and anxious. She is

## 2025-03-07 NOTE — PROGRESS NOTES
Postop Progress Note    Subjective    Deidra Dumont presents to the office for postop follow up.  Reports no issues.     Objective    Vitals:    03/07/25 1135   BP: (!) 148/78     General: alert, cooperative and no distress  Incision: healing well    Assessment  Doing well postoperatively.    Plan  Carotid duplex in 6 months.       Electronically signed by Eliazar Vaughn MD on 3/7/2025 at 11:58 AM

## 2025-03-11 DIAGNOSIS — I65.23 CAROTID STENOSIS, BILATERAL: Primary | ICD-10-CM

## 2025-03-20 ENCOUNTER — TELEPHONE (OUTPATIENT)
Dept: CARDIOLOGY CLINIC | Age: 80
End: 2025-03-20

## 2025-04-03 ENCOUNTER — TELEPHONE (OUTPATIENT)
Dept: CARDIOLOGY CLINIC | Age: 80
End: 2025-04-03

## 2025-04-03 DIAGNOSIS — I65.23 BILATERAL CAROTID ARTERY STENOSIS: Primary | ICD-10-CM

## 2025-04-03 NOTE — TELEPHONE ENCOUNTER
Called patient regarding scheduling her carotid duplex scan for June of 2025. She is be done at 3 months per Dr Vaughn. She is scheduled at [facility] for June 23, 2025 at 2:00pm and arrival of 1:30pm. Li

## 2025-06-06 ENCOUNTER — HOSPITAL ENCOUNTER (INPATIENT)
Age: 80
LOS: 6 days | Discharge: HOME | DRG: 322 | End: 2025-06-13
Attending: EMERGENCY MEDICINE | Admitting: INTERNAL MEDICINE
Payer: MEDICARE

## 2025-06-06 ENCOUNTER — APPOINTMENT (OUTPATIENT)
Dept: GENERAL RADIOLOGY | Age: 80
DRG: 322 | End: 2025-06-06
Payer: MEDICARE

## 2025-06-06 ENCOUNTER — APPOINTMENT (OUTPATIENT)
Dept: CT IMAGING | Age: 80
DRG: 322 | End: 2025-06-06
Payer: MEDICARE

## 2025-06-06 ENCOUNTER — OFFICE VISIT (OUTPATIENT)
Dept: CARDIOLOGY CLINIC | Age: 80
End: 2025-06-06
Payer: MEDICARE

## 2025-06-06 VITALS
HEART RATE: 64 BPM | SYSTOLIC BLOOD PRESSURE: 80 MMHG | OXYGEN SATURATION: 97 % | WEIGHT: 159 LBS | DIASTOLIC BLOOD PRESSURE: 62 MMHG | BODY MASS INDEX: 30.04 KG/M2

## 2025-06-06 DIAGNOSIS — R07.9 CHEST PAIN, UNSPECIFIED TYPE: Primary | ICD-10-CM

## 2025-06-06 DIAGNOSIS — I25.83 CORONARY ARTERY DISEASE DUE TO LIPID RICH PLAQUE: ICD-10-CM

## 2025-06-06 DIAGNOSIS — R07.9 CHEST PAIN: ICD-10-CM

## 2025-06-06 DIAGNOSIS — R06.89 DIFFICULTY BREATHING: ICD-10-CM

## 2025-06-06 DIAGNOSIS — R11.2 NAUSEA AND VOMITING, UNSPECIFIED VOMITING TYPE: ICD-10-CM

## 2025-06-06 DIAGNOSIS — R10.10 UPPER ABDOMINAL PAIN: ICD-10-CM

## 2025-06-06 DIAGNOSIS — R07.89 OTHER CHEST PAIN: Primary | ICD-10-CM

## 2025-06-06 DIAGNOSIS — I10 PRIMARY HYPERTENSION: ICD-10-CM

## 2025-06-06 DIAGNOSIS — E78.2 MIXED HYPERLIPIDEMIA: ICD-10-CM

## 2025-06-06 DIAGNOSIS — I25.10 CORONARY ARTERY DISEASE DUE TO LIPID RICH PLAQUE: ICD-10-CM

## 2025-06-06 PROBLEM — K20.90 ESOPHAGITIS: Status: ACTIVE | Noted: 2025-06-06

## 2025-06-06 PROBLEM — I27.0 PRIMARY PULMONARY HTN (HCC): Status: RESOLVED | Noted: 2025-06-06 | Resolved: 2025-06-06

## 2025-06-06 PROBLEM — I27.0 PRIMARY PULMONARY HTN (HCC): Status: ACTIVE | Noted: 2025-06-06

## 2025-06-06 PROBLEM — E66.811 CLASS 1 OBESITY: Status: ACTIVE | Noted: 2025-06-06

## 2025-06-06 PROBLEM — R79.89 ELEVATED BRAIN NATRIURETIC PEPTIDE (BNP) LEVEL: Status: ACTIVE | Noted: 2025-06-06

## 2025-06-06 PROBLEM — N17.9 ACUTE KIDNEY INJURY SUPERIMPOSED ON STAGE 3B CHRONIC KIDNEY DISEASE (HCC): Status: ACTIVE | Noted: 2025-06-06

## 2025-06-06 PROBLEM — R74.8 ELEVATED ALKALINE PHOSPHATASE LEVEL: Status: ACTIVE | Noted: 2025-06-06

## 2025-06-06 PROBLEM — N18.32 ACUTE KIDNEY INJURY SUPERIMPOSED ON STAGE 3B CHRONIC KIDNEY DISEASE (HCC): Status: ACTIVE | Noted: 2025-06-06

## 2025-06-06 LAB
ALBUMIN SERPL-MCNC: 3.9 G/DL (ref 3.4–5)
ALBUMIN SERPL-MCNC: 4.1 G/DL (ref 3.4–5)
ALBUMIN/GLOB SERPL: 1.1 {RATIO} (ref 1.1–2.2)
ALP SERPL-CCNC: 195 U/L (ref 40–129)
ALP SERPL-CCNC: 220 U/L (ref 40–129)
ALT SERPL-CCNC: 37 U/L (ref 10–40)
ALT SERPL-CCNC: 39 U/L (ref 10–40)
ANION GAP SERPL CALCULATED.3IONS-SCNC: 16 MMOL/L (ref 3–16)
AST SERPL-CCNC: 39 U/L (ref 15–37)
AST SERPL-CCNC: 45 U/L (ref 15–37)
BASOPHILS # BLD: 0.1 K/UL (ref 0–0.2)
BASOPHILS NFR BLD: 0.8 %
BILIRUB DIRECT SERPL-MCNC: <0.1 MG/DL (ref 0–0.3)
BILIRUB INDIRECT SERPL-MCNC: 0.2 MG/DL (ref 0–1)
BILIRUB SERPL-MCNC: 0.3 MG/DL (ref 0–1)
BILIRUB SERPL-MCNC: 0.3 MG/DL (ref 0–1)
BUN SERPL-MCNC: 26 MG/DL (ref 7–20)
CALCIUM SERPL-MCNC: 10.3 MG/DL (ref 8.3–10.6)
CHLORIDE SERPL-SCNC: 101 MMOL/L (ref 99–110)
CO2 SERPL-SCNC: 19 MMOL/L (ref 21–32)
CREAT SERPL-MCNC: 2 MG/DL (ref 0.6–1.2)
DEPRECATED RDW RBC AUTO: 15 % (ref 12.4–15.4)
EOSINOPHIL # BLD: 0.3 K/UL (ref 0–0.6)
EOSINOPHIL NFR BLD: 2.5 %
GFR SERPLBLD CREATININE-BSD FMLA CKD-EPI: 25 ML/MIN/{1.73_M2}
GLUCOSE SERPL-MCNC: 120 MG/DL (ref 70–99)
HCT VFR BLD AUTO: 37.9 % (ref 36–48)
HGB BLD-MCNC: 12.3 G/DL (ref 12–16)
LIPASE SERPL-CCNC: 37 U/L (ref 13–60)
LYMPHOCYTES # BLD: 2.4 K/UL (ref 1–5.1)
LYMPHOCYTES NFR BLD: 20.3 %
MCH RBC QN AUTO: 30.8 PG (ref 26–34)
MCHC RBC AUTO-ENTMCNC: 32.5 G/DL (ref 31–36)
MCV RBC AUTO: 94.6 FL (ref 80–100)
MONOCYTES # BLD: 1.3 K/UL (ref 0–1.3)
MONOCYTES NFR BLD: 10.5 %
NEUTROPHILS # BLD: 7.9 K/UL (ref 1.7–7.7)
NEUTROPHILS NFR BLD: 65.9 %
NT-PROBNP SERPL-MCNC: 3024 PG/ML (ref 0–449)
PLATELET # BLD AUTO: 314 K/UL (ref 135–450)
PMV BLD AUTO: 8.3 FL (ref 5–10.5)
POTASSIUM SERPL-SCNC: 5.1 MMOL/L (ref 3.5–5.1)
PROT SERPL-MCNC: 7 G/DL (ref 6.4–8.2)
PROT SERPL-MCNC: 7.8 G/DL (ref 6.4–8.2)
RBC # BLD AUTO: 4 M/UL (ref 4–5.2)
SODIUM SERPL-SCNC: 136 MMOL/L (ref 136–145)
TROPONIN, HIGH SENSITIVITY: 24 NG/L (ref 0–14)
TROPONIN, HIGH SENSITIVITY: 24 NG/L (ref 0–14)
WBC # BLD AUTO: 12.1 K/UL (ref 4–11)

## 2025-06-06 PROCEDURE — 6370000000 HC RX 637 (ALT 250 FOR IP): Performed by: HOSPITALIST

## 2025-06-06 PROCEDURE — 96374 THER/PROPH/DIAG INJ IV PUSH: CPT

## 2025-06-06 PROCEDURE — 1123F ACP DISCUSS/DSCN MKR DOCD: CPT | Performed by: NURSE PRACTITIONER

## 2025-06-06 PROCEDURE — 3078F DIAST BP <80 MM HG: CPT | Performed by: NURSE PRACTITIONER

## 2025-06-06 PROCEDURE — 99214 OFFICE O/P EST MOD 30 MIN: CPT | Performed by: NURSE PRACTITIONER

## 2025-06-06 PROCEDURE — G8417 CALC BMI ABV UP PARAM F/U: HCPCS | Performed by: NURSE PRACTITIONER

## 2025-06-06 PROCEDURE — 3074F SYST BP LT 130 MM HG: CPT | Performed by: NURSE PRACTITIONER

## 2025-06-06 PROCEDURE — G0378 HOSPITAL OBSERVATION PER HR: HCPCS

## 2025-06-06 PROCEDURE — 6360000002 HC RX W HCPCS: Performed by: EMERGENCY MEDICINE

## 2025-06-06 PROCEDURE — 83690 ASSAY OF LIPASE: CPT

## 2025-06-06 PROCEDURE — 93000 ELECTROCARDIOGRAM COMPLETE: CPT | Performed by: NURSE PRACTITIONER

## 2025-06-06 PROCEDURE — 99285 EMERGENCY DEPT VISIT HI MDM: CPT

## 2025-06-06 PROCEDURE — 80053 COMPREHEN METABOLIC PANEL: CPT

## 2025-06-06 PROCEDURE — 1159F MED LIST DOCD IN RCRD: CPT | Performed by: NURSE PRACTITIONER

## 2025-06-06 PROCEDURE — 6360000002 HC RX W HCPCS: Performed by: PHYSICIAN ASSISTANT

## 2025-06-06 PROCEDURE — G8400 PT W/DXA NO RESULTS DOC: HCPCS | Performed by: NURSE PRACTITIONER

## 2025-06-06 PROCEDURE — 96375 TX/PRO/DX INJ NEW DRUG ADDON: CPT

## 2025-06-06 PROCEDURE — 93005 ELECTROCARDIOGRAM TRACING: CPT | Performed by: PHYSICIAN ASSISTANT

## 2025-06-06 PROCEDURE — 1090F PRES/ABSN URINE INCON ASSESS: CPT | Performed by: NURSE PRACTITIONER

## 2025-06-06 PROCEDURE — 84484 ASSAY OF TROPONIN QUANT: CPT

## 2025-06-06 PROCEDURE — 2500000003 HC RX 250 WO HCPCS: Performed by: HOSPITALIST

## 2025-06-06 PROCEDURE — G8427 DOCREV CUR MEDS BY ELIG CLIN: HCPCS | Performed by: NURSE PRACTITIONER

## 2025-06-06 PROCEDURE — 83880 ASSAY OF NATRIURETIC PEPTIDE: CPT

## 2025-06-06 PROCEDURE — 1036F TOBACCO NON-USER: CPT | Performed by: NURSE PRACTITIONER

## 2025-06-06 PROCEDURE — 93005 ELECTROCARDIOGRAM TRACING: CPT | Performed by: EMERGENCY MEDICINE

## 2025-06-06 PROCEDURE — 6370000000 HC RX 637 (ALT 250 FOR IP): Performed by: PHYSICIAN ASSISTANT

## 2025-06-06 PROCEDURE — 96376 TX/PRO/DX INJ SAME DRUG ADON: CPT

## 2025-06-06 PROCEDURE — 85025 COMPLETE CBC W/AUTO DIFF WBC: CPT

## 2025-06-06 PROCEDURE — 74176 CT ABD & PELVIS W/O CONTRAST: CPT

## 2025-06-06 PROCEDURE — 71045 X-RAY EXAM CHEST 1 VIEW: CPT

## 2025-06-06 PROCEDURE — 36415 COLL VENOUS BLD VENIPUNCTURE: CPT

## 2025-06-06 RX ORDER — PANTOPRAZOLE SODIUM 40 MG/10ML
40 INJECTION, POWDER, LYOPHILIZED, FOR SOLUTION INTRAVENOUS ONCE
Status: COMPLETED | OUTPATIENT
Start: 2025-06-06 | End: 2025-06-06

## 2025-06-06 RX ORDER — SODIUM CHLORIDE 0.9 % (FLUSH) 0.9 %
5-40 SYRINGE (ML) INJECTION EVERY 12 HOURS SCHEDULED
Status: DISCONTINUED | OUTPATIENT
Start: 2025-06-06 | End: 2025-06-12 | Stop reason: SDUPTHER

## 2025-06-06 RX ORDER — PANTOPRAZOLE SODIUM 40 MG/10ML
40 INJECTION, POWDER, LYOPHILIZED, FOR SOLUTION INTRAVENOUS 2 TIMES DAILY
Status: DISCONTINUED | OUTPATIENT
Start: 2025-06-07 | End: 2025-06-13 | Stop reason: HOSPADM

## 2025-06-06 RX ORDER — MORPHINE SULFATE 2 MG/ML
2 INJECTION, SOLUTION INTRAMUSCULAR; INTRAVENOUS
Status: DISCONTINUED | OUTPATIENT
Start: 2025-06-06 | End: 2025-06-07

## 2025-06-06 RX ORDER — CLOPIDOGREL BISULFATE 75 MG/1
75 TABLET ORAL DAILY
Status: ON HOLD | COMMUNITY

## 2025-06-06 RX ORDER — POLYETHYLENE GLYCOL 3350 17 G/17G
17 POWDER, FOR SOLUTION ORAL DAILY PRN
Status: DISCONTINUED | OUTPATIENT
Start: 2025-06-06 | End: 2025-06-13 | Stop reason: HOSPADM

## 2025-06-06 RX ORDER — ATORVASTATIN CALCIUM 40 MG/1
40 TABLET, FILM COATED ORAL NIGHTLY
Status: DISCONTINUED | OUTPATIENT
Start: 2025-06-06 | End: 2025-06-06 | Stop reason: ALTCHOICE

## 2025-06-06 RX ORDER — ASPIRIN 81 MG/1
324 TABLET, CHEWABLE ORAL ONCE
Status: COMPLETED | OUTPATIENT
Start: 2025-06-06 | End: 2025-06-06

## 2025-06-06 RX ORDER — SODIUM CHLORIDE 0.9 % (FLUSH) 0.9 %
5-40 SYRINGE (ML) INJECTION PRN
Status: DISCONTINUED | OUTPATIENT
Start: 2025-06-06 | End: 2025-06-12 | Stop reason: SDUPTHER

## 2025-06-06 RX ORDER — ENOXAPARIN SODIUM 100 MG/ML
30 INJECTION SUBCUTANEOUS DAILY
Status: DISCONTINUED | OUTPATIENT
Start: 2025-06-07 | End: 2025-06-07

## 2025-06-06 RX ORDER — MORPHINE SULFATE 4 MG/ML
4 INJECTION, SOLUTION INTRAMUSCULAR; INTRAVENOUS EVERY 30 MIN PRN
Refills: 0 | Status: DISCONTINUED | OUTPATIENT
Start: 2025-06-06 | End: 2025-06-06 | Stop reason: ALTCHOICE

## 2025-06-06 RX ORDER — ASPIRIN 81 MG/1
81 TABLET, CHEWABLE ORAL DAILY
Status: DISCONTINUED | OUTPATIENT
Start: 2025-06-07 | End: 2025-06-13 | Stop reason: HOSPADM

## 2025-06-06 RX ORDER — ACETAMINOPHEN 650 MG/1
650 SUPPOSITORY RECTAL EVERY 6 HOURS PRN
Status: DISCONTINUED | OUTPATIENT
Start: 2025-06-06 | End: 2025-06-13 | Stop reason: HOSPADM

## 2025-06-06 RX ORDER — ACETAMINOPHEN 500 MG
1000 TABLET ORAL EVERY 8 HOURS PRN
Status: DISCONTINUED | OUTPATIENT
Start: 2025-06-06 | End: 2025-06-06 | Stop reason: ALTCHOICE

## 2025-06-06 RX ORDER — SODIUM CHLORIDE 9 MG/ML
INJECTION, SOLUTION INTRAVENOUS PRN
Status: DISCONTINUED | OUTPATIENT
Start: 2025-06-06 | End: 2025-06-12 | Stop reason: SDUPTHER

## 2025-06-06 RX ORDER — ACETAMINOPHEN 325 MG/1
650 TABLET ORAL EVERY 6 HOURS PRN
Status: DISCONTINUED | OUTPATIENT
Start: 2025-06-06 | End: 2025-06-12 | Stop reason: SDUPTHER

## 2025-06-06 RX ORDER — ONDANSETRON 2 MG/ML
4 INJECTION INTRAMUSCULAR; INTRAVENOUS EVERY 6 HOURS PRN
Status: DISCONTINUED | OUTPATIENT
Start: 2025-06-06 | End: 2025-06-13 | Stop reason: HOSPADM

## 2025-06-06 RX ORDER — ONDANSETRON 4 MG/1
4 TABLET, ORALLY DISINTEGRATING ORAL EVERY 8 HOURS PRN
Status: DISCONTINUED | OUTPATIENT
Start: 2025-06-06 | End: 2025-06-13 | Stop reason: HOSPADM

## 2025-06-06 RX ORDER — ROSUVASTATIN CALCIUM 10 MG/1
10 TABLET, COATED ORAL NIGHTLY
Status: DISCONTINUED | OUTPATIENT
Start: 2025-06-06 | End: 2025-06-07

## 2025-06-06 RX ORDER — OXYCODONE HYDROCHLORIDE 5 MG/1
5 TABLET ORAL
Refills: 0 | Status: DISCONTINUED | OUTPATIENT
Start: 2025-06-06 | End: 2025-06-06 | Stop reason: HOSPADM

## 2025-06-06 RX ORDER — MORPHINE SULFATE 2 MG/ML
1 INJECTION, SOLUTION INTRAMUSCULAR; INTRAVENOUS
Status: DISCONTINUED | OUTPATIENT
Start: 2025-06-06 | End: 2025-06-07

## 2025-06-06 RX ADMIN — ASPIRIN 324 MG: 81 TABLET, CHEWABLE ORAL at 17:06

## 2025-06-06 RX ADMIN — ROSUVASTATIN 10 MG: 10 TABLET, FILM COATED ORAL at 23:40

## 2025-06-06 RX ADMIN — Medication 10 ML: at 23:41

## 2025-06-06 RX ADMIN — PANTOPRAZOLE SODIUM 40 MG: 40 INJECTION, POWDER, LYOPHILIZED, FOR SOLUTION INTRAVENOUS at 21:55

## 2025-06-06 RX ADMIN — MORPHINE SULFATE 4 MG: 4 INJECTION INTRAVENOUS at 21:55

## 2025-06-06 RX ADMIN — MORPHINE SULFATE 4 MG: 4 INJECTION INTRAVENOUS at 17:44

## 2025-06-06 ASSESSMENT — PAIN SCALES - GENERAL
PAINLEVEL_OUTOF10: 7
PAINLEVEL_OUTOF10: 7
PAINLEVEL_OUTOF10: 5
PAINLEVEL_OUTOF10: 7
PAINLEVEL_OUTOF10: 7

## 2025-06-06 ASSESSMENT — PAIN - FUNCTIONAL ASSESSMENT: PAIN_FUNCTIONAL_ASSESSMENT: 0-10

## 2025-06-06 ASSESSMENT — LIFESTYLE VARIABLES
HOW MANY STANDARD DRINKS CONTAINING ALCOHOL DO YOU HAVE ON A TYPICAL DAY: 1 OR 2
HOW OFTEN DO YOU HAVE A DRINK CONTAINING ALCOHOL: MONTHLY OR LESS

## 2025-06-06 ASSESSMENT — PAIN DESCRIPTION - LOCATION
LOCATION: BACK

## 2025-06-06 NOTE — PROGRESS NOTES
Episode of n/v prior to appt. SBP soft     ~episodes of chest pain occurring weekly  ~myoview postponed d/t high grade carotid stenosis Rt ; now s/p Rt CEA    ~DAPT / BB / statin  ~Hgb 8.7 with labs Feb '25    ~troponin elevated early Dec '24 59 (concerns for type II NSTEMI)   ~no RWMA seen with echo Dec '24, EF 60-65%  ~s/p PTCA OM-1 July '23  ~staged PTCA RCA instent stenosis Aug '23  ~normal wall motion by echo Nov '23  ~s/p PTCA RCA March '09  ~ s/p PTCA RCA Jan '12  ~ IWMI CAD s/p PTCA RCA Aug '15       2. Mixed hyperlipidemia   ~unchanged with last profile 6 months ago  ~not controlled by labs from Sept '24  ~HDL / trig suboptimal ; hx diabetes from '17, no agents reported for management   ~rosuvastatin 40 mg daily        3. Primary hypertension   ~low BP today     ~bexatolol   ~normal LVF  ~hx syncope and hypotension ; previously had taken amlodipine / hydralazine which had been discontinued       4.    SVT   ~denies palpitations ; AP controlled   ~sinus rhythm on event monitor : Jan with 3 SVT episodes. Longest at 9 beats   PVC Nemo at 4.46%;  PSVC Nemo at 0.22 %   ~remains on BB / ASA      I had the opportunity to review the clinical symptoms and presentation of Deidra Dumont.   Plan:     EKG: sinus rhythm , no acute changes  She will  proceed to the ER : continues to have CP with back discomfort ; BP 80/ LUE and hx of CAD    Overall the patient is stable from CV standpoint    I have addresed the patient's cardiac risk factors and adjusted pharmacologic treatment as needed. In addition, I have reinforced the need for patient directed risk factor modification.    Further evaluation will be based upon the patient's clinical course and testing results.    All questions and concerns were addressed to the patient. Alternatives to my treatment were discussed.      The patient is not currently smoking.     Patient is on a beta-blocker  Patient is not on an ace-i/ARB : CKD stage IIIb; baseline GFR

## 2025-06-07 LAB
25(OH)D3 SERPL-MCNC: 30.7 NG/ML
ALBUMIN SERPL-MCNC: 3.6 G/DL (ref 3.4–5)
ALBUMIN/GLOB SERPL: 1.2 {RATIO} (ref 1.1–2.2)
ALP SERPL-CCNC: 184 U/L (ref 40–129)
ALT SERPL-CCNC: 31 U/L (ref 10–40)
ANION GAP SERPL CALCULATED.3IONS-SCNC: 10 MMOL/L (ref 3–16)
AST SERPL-CCNC: 31 U/L (ref 15–37)
BACTERIA URNS QL MICRO: ABNORMAL /HPF
BILIRUB SERPL-MCNC: 0.3 MG/DL (ref 0–1)
BILIRUB UR QL STRIP.AUTO: NEGATIVE
BUN SERPL-MCNC: 35 MG/DL (ref 7–20)
CALCIUM SERPL-MCNC: 9.5 MG/DL (ref 8.3–10.6)
CHLORIDE SERPL-SCNC: 104 MMOL/L (ref 99–110)
CHOLEST SERPL-MCNC: 186 MG/DL (ref 0–199)
CLARITY UR: ABNORMAL
CO2 SERPL-SCNC: 22 MMOL/L (ref 21–32)
COLOR UR: YELLOW
CREAT SERPL-MCNC: 2.3 MG/DL (ref 0.6–1.2)
CREAT UR-MCNC: 81.7 MG/DL (ref 28–259)
DEPRECATED RDW RBC AUTO: 14.8 % (ref 12.4–15.4)
EKG ATRIAL RATE: 71 BPM
EKG ATRIAL RATE: 74 BPM
EKG DIAGNOSIS: NORMAL
EKG DIAGNOSIS: NORMAL
EKG P AXIS: 48 DEGREES
EKG P AXIS: 54 DEGREES
EKG P-R INTERVAL: 138 MS
EKG P-R INTERVAL: 140 MS
EKG Q-T INTERVAL: 398 MS
EKG Q-T INTERVAL: 412 MS
EKG QRS DURATION: 88 MS
EKG QRS DURATION: 90 MS
EKG QTC CALCULATION (BAZETT): 432 MS
EKG QTC CALCULATION (BAZETT): 457 MS
EKG R AXIS: -4 DEGREES
EKG R AXIS: -7 DEGREES
EKG T AXIS: 81 DEGREES
EKG T AXIS: 82 DEGREES
EKG VENTRICULAR RATE: 71 BPM
EKG VENTRICULAR RATE: 74 BPM
EPI CELLS #/AREA URNS AUTO: 14 /HPF (ref 0–5)
GFR SERPLBLD CREATININE-BSD FMLA CKD-EPI: 21 ML/MIN/{1.73_M2}
GLUCOSE SERPL-MCNC: 109 MG/DL (ref 70–99)
GLUCOSE UR STRIP.AUTO-MCNC: NEGATIVE MG/DL
HCT VFR BLD AUTO: 33.4 % (ref 36–48)
HDLC SERPL-MCNC: 39 MG/DL (ref 40–60)
HGB BLD-MCNC: 10.9 G/DL (ref 12–16)
HGB UR QL STRIP.AUTO: NEGATIVE
HYALINE CASTS #/AREA URNS AUTO: 5 /LPF (ref 0–8)
IRON SATN MFR SERPL: 20 % (ref 15–50)
IRON SERPL-MCNC: 61 UG/DL (ref 37–145)
KETONES UR STRIP.AUTO-MCNC: NEGATIVE MG/DL
LDLC SERPL CALC-MCNC: 93 MG/DL
LEUKOCYTE ESTERASE UR QL STRIP.AUTO: ABNORMAL
MAGNESIUM SERPL-MCNC: 1.88 MG/DL (ref 1.8–2.4)
MCH RBC QN AUTO: 30.9 PG (ref 26–34)
MCHC RBC AUTO-ENTMCNC: 32.8 G/DL (ref 31–36)
MCV RBC AUTO: 94.2 FL (ref 80–100)
NITRITE UR QL STRIP.AUTO: NEGATIVE
PH UR STRIP.AUTO: 5 [PH] (ref 5–8)
PLATELET # BLD AUTO: 256 K/UL (ref 135–450)
PMV BLD AUTO: 8.3 FL (ref 5–10.5)
POTASSIUM SERPL-SCNC: 5 MMOL/L (ref 3.5–5.1)
PROT SERPL-MCNC: 6.5 G/DL (ref 6.4–8.2)
PROT UR STRIP.AUTO-MCNC: 30 MG/DL
RBC # BLD AUTO: 3.54 M/UL (ref 4–5.2)
RBC CLUMPS #/AREA URNS AUTO: 0 /HPF (ref 0–4)
REASON FOR REJECTION: NORMAL
REJECTED TEST: NORMAL
SODIUM SERPL-SCNC: 136 MMOL/L (ref 136–145)
SODIUM UR-SCNC: <20 MMOL/L
SP GR UR STRIP.AUTO: 1.01 (ref 1–1.03)
TIBC SERPL-MCNC: 309 UG/DL (ref 260–445)
TRIGL SERPL-MCNC: 272 MG/DL (ref 0–150)
TSH SERPL DL<=0.005 MIU/L-ACNC: 2.81 UIU/ML (ref 0.27–4.2)
UA DIPSTICK W REFLEX MICRO PNL UR: YES
URN SPEC COLLECT METH UR: ABNORMAL
UROBILINOGEN UR STRIP-ACNC: 0.2 E.U./DL
VIT B12 SERPL-MCNC: 852 PG/ML (ref 211–911)
VLDLC SERPL CALC-MCNC: 54 MG/DL
WBC # BLD AUTO: 11.7 K/UL (ref 4–11)
WBC #/AREA URNS AUTO: 31 /HPF (ref 0–5)

## 2025-06-07 PROCEDURE — 2500000003 HC RX 250 WO HCPCS: Performed by: HOSPITALIST

## 2025-06-07 PROCEDURE — 2580000003 HC RX 258: Performed by: INTERNAL MEDICINE

## 2025-06-07 PROCEDURE — 83735 ASSAY OF MAGNESIUM: CPT

## 2025-06-07 PROCEDURE — 6360000002 HC RX W HCPCS: Performed by: NURSE PRACTITIONER

## 2025-06-07 PROCEDURE — 84443 ASSAY THYROID STIM HORMONE: CPT

## 2025-06-07 PROCEDURE — 6370000000 HC RX 637 (ALT 250 FOR IP): Performed by: NURSE PRACTITIONER

## 2025-06-07 PROCEDURE — 93010 ELECTROCARDIOGRAM REPORT: CPT | Performed by: INTERNAL MEDICINE

## 2025-06-07 PROCEDURE — 1200000000 HC SEMI PRIVATE

## 2025-06-07 PROCEDURE — 6360000002 HC RX W HCPCS: Performed by: HOSPITALIST

## 2025-06-07 PROCEDURE — 84300 ASSAY OF URINE SODIUM: CPT

## 2025-06-07 PROCEDURE — 6360000002 HC RX W HCPCS: Performed by: INTERNAL MEDICINE

## 2025-06-07 PROCEDURE — 2500000003 HC RX 250 WO HCPCS: Performed by: NURSE PRACTITIONER

## 2025-06-07 PROCEDURE — 81001 URINALYSIS AUTO W/SCOPE: CPT

## 2025-06-07 PROCEDURE — 83550 IRON BINDING TEST: CPT

## 2025-06-07 PROCEDURE — 82306 VITAMIN D 25 HYDROXY: CPT

## 2025-06-07 PROCEDURE — 80053 COMPREHEN METABOLIC PANEL: CPT

## 2025-06-07 PROCEDURE — 99223 1ST HOSP IP/OBS HIGH 75: CPT | Performed by: INTERNAL MEDICINE

## 2025-06-07 PROCEDURE — 6370000000 HC RX 637 (ALT 250 FOR IP): Performed by: INTERNAL MEDICINE

## 2025-06-07 PROCEDURE — 96376 TX/PRO/DX INJ SAME DRUG ADON: CPT

## 2025-06-07 PROCEDURE — 83540 ASSAY OF IRON: CPT

## 2025-06-07 PROCEDURE — 82570 ASSAY OF URINE CREATININE: CPT

## 2025-06-07 PROCEDURE — 82607 VITAMIN B-12: CPT

## 2025-06-07 PROCEDURE — 87186 SC STD MICRODIL/AGAR DIL: CPT

## 2025-06-07 PROCEDURE — 87088 URINE BACTERIA CULTURE: CPT

## 2025-06-07 PROCEDURE — 87086 URINE CULTURE/COLONY COUNT: CPT

## 2025-06-07 PROCEDURE — 36415 COLL VENOUS BLD VENIPUNCTURE: CPT

## 2025-06-07 PROCEDURE — 6370000000 HC RX 637 (ALT 250 FOR IP): Performed by: HOSPITALIST

## 2025-06-07 PROCEDURE — 80061 LIPID PANEL: CPT

## 2025-06-07 PROCEDURE — 85027 COMPLETE CBC AUTOMATED: CPT

## 2025-06-07 RX ORDER — ISOSORBIDE MONONITRATE 30 MG/1
15 TABLET, EXTENDED RELEASE ORAL DAILY
Status: DISCONTINUED | OUTPATIENT
Start: 2025-06-07 | End: 2025-06-13 | Stop reason: HOSPADM

## 2025-06-07 RX ORDER — SODIUM CHLORIDE 9 MG/ML
INJECTION, SOLUTION INTRAVENOUS CONTINUOUS
Status: DISCONTINUED | OUTPATIENT
Start: 2025-06-07 | End: 2025-06-08

## 2025-06-07 RX ORDER — HYDROCODONE BITARTRATE AND ACETAMINOPHEN 10; 325 MG/1; MG/1
1 TABLET ORAL EVERY 6 HOURS PRN
Status: DISCONTINUED | OUTPATIENT
Start: 2025-06-07 | End: 2025-06-13 | Stop reason: HOSPADM

## 2025-06-07 RX ORDER — METHOCARBAMOL 500 MG/1
500 TABLET, FILM COATED ORAL 3 TIMES DAILY
Status: DISCONTINUED | OUTPATIENT
Start: 2025-06-07 | End: 2025-06-13 | Stop reason: HOSPADM

## 2025-06-07 RX ORDER — HEPARIN SODIUM 5000 [USP'U]/ML
5000 INJECTION, SOLUTION INTRAVENOUS; SUBCUTANEOUS EVERY 8 HOURS SCHEDULED
Status: DISCONTINUED | OUTPATIENT
Start: 2025-06-07 | End: 2025-06-13 | Stop reason: HOSPADM

## 2025-06-07 RX ORDER — MAGNESIUM SULFATE IN WATER 40 MG/ML
2000 INJECTION, SOLUTION INTRAVENOUS ONCE
Status: COMPLETED | OUTPATIENT
Start: 2025-06-07 | End: 2025-06-07

## 2025-06-07 RX ORDER — DIPHENHYDRAMINE HCL 25 MG
25 TABLET ORAL EVERY 8 HOURS PRN
Status: DISCONTINUED | OUTPATIENT
Start: 2025-06-07 | End: 2025-06-13 | Stop reason: HOSPADM

## 2025-06-07 RX ORDER — ENOXAPARIN SODIUM 100 MG/ML
40 INJECTION SUBCUTANEOUS DAILY
Status: DISCONTINUED | OUTPATIENT
Start: 2025-06-08 | End: 2025-06-07

## 2025-06-07 RX ORDER — ROSUVASTATIN CALCIUM 20 MG/1
20 TABLET, COATED ORAL NIGHTLY
Status: DISCONTINUED | OUTPATIENT
Start: 2025-06-07 | End: 2025-06-13 | Stop reason: HOSPADM

## 2025-06-07 RX ORDER — VENLAFAXINE 37.5 MG/1
75 TABLET ORAL 2 TIMES DAILY
Status: DISCONTINUED | OUTPATIENT
Start: 2025-06-07 | End: 2025-06-13 | Stop reason: HOSPADM

## 2025-06-07 RX ADMIN — PANTOPRAZOLE SODIUM 40 MG: 40 INJECTION, POWDER, FOR SOLUTION INTRAVENOUS at 19:22

## 2025-06-07 RX ADMIN — DIPHENHYDRAMINE HYDROCHLORIDE 25 MG: 25 TABLET ORAL at 22:55

## 2025-06-07 RX ADMIN — VENLAFAXINE HYDROCHLORIDE 75 MG: 37.5 TABLET ORAL at 19:21

## 2025-06-07 RX ADMIN — Medication 10 ML: at 09:49

## 2025-06-07 RX ADMIN — PANTOPRAZOLE SODIUM 40 MG: 40 INJECTION, POWDER, FOR SOLUTION INTRAVENOUS at 09:49

## 2025-06-07 RX ADMIN — ROSUVASTATIN CALCIUM 20 MG: 20 TABLET, FILM COATED ORAL at 19:21

## 2025-06-07 RX ADMIN — HEPARIN SODIUM 5000 UNITS: 5000 INJECTION INTRAVENOUS; SUBCUTANEOUS at 14:47

## 2025-06-07 RX ADMIN — HEPARIN SODIUM 5000 UNITS: 5000 INJECTION INTRAVENOUS; SUBCUTANEOUS at 21:44

## 2025-06-07 RX ADMIN — ASPIRIN 81 MG: 81 TABLET, CHEWABLE ORAL at 09:49

## 2025-06-07 RX ADMIN — METHOCARBAMOL 500 MG: 500 TABLET ORAL at 19:35

## 2025-06-07 RX ADMIN — WATER 1000 MG: 1 INJECTION INTRAMUSCULAR; INTRAVENOUS; SUBCUTANEOUS at 19:21

## 2025-06-07 RX ADMIN — MORPHINE SULFATE 2 MG: 2 INJECTION, SOLUTION INTRAMUSCULAR; INTRAVENOUS at 00:54

## 2025-06-07 RX ADMIN — Medication 10 ML: at 19:22

## 2025-06-07 RX ADMIN — SODIUM CHLORIDE: 0.9 INJECTION, SOLUTION INTRAVENOUS at 15:01

## 2025-06-07 RX ADMIN — MAGNESIUM SULFATE HEPTAHYDRATE 2000 MG: 2 INJECTION, SOLUTION INTRAVENOUS at 15:01

## 2025-06-07 ASSESSMENT — PAIN SCALES - GENERAL
PAINLEVEL_OUTOF10: 7
PAINLEVEL_OUTOF10: 0
PAINLEVEL_OUTOF10: 0
PAINLEVEL_OUTOF10: 6
PAINLEVEL_OUTOF10: 8
PAINLEVEL_OUTOF10: 0
PAINLEVEL_OUTOF10: 5

## 2025-06-07 ASSESSMENT — PAIN DESCRIPTION - LOCATION: LOCATION: ABDOMEN

## 2025-06-07 ASSESSMENT — PAIN DESCRIPTION - ORIENTATION: ORIENTATION: MID

## 2025-06-07 ASSESSMENT — PAIN DESCRIPTION - PAIN TYPE: TYPE: ACUTE PAIN

## 2025-06-07 ASSESSMENT — PAIN DESCRIPTION - DESCRIPTORS: DESCRIPTORS: DISCOMFORT

## 2025-06-07 NOTE — ED PROVIDER NOTES
[facility] EMERGENCY DEPARTMENT  EMERGENCY DEPARTMENT ENCOUNTER        Pt Name: Deidra Dumont  MRN: [id number]  Birthdate 1945  Date of evaluation: 6/6/2025  Provider: Juan Yeh PA-C  PCP: Ilya Lomax MD  Note Started: 8:11 PM EDT 6/6/25       I have seen and evaluated this patient with my supervising physician Raphael Garza MD.      CHIEF COMPLAINT       Chief Complaint   Patient presents with    Chest Pain     Advised to come to emergency room by cardiology. N/v today, chest pain today. History of prior coronary artery diease + stents.       HISTORY OF PRESENT ILLNESS: 1 or more Elements     History From: patient  Limitations to history : None    Deidra Dumont is a 79 y.o. female who presents to the emergency department about 1.5 hours after she began having some chest pain.  Patient states she was driving when she began to have some nausea, vomiting and then shortly after that began getting chest pain that goes into her back associate with some shortness of breath.  She has history of hypertension, hyperlipidemia and coronary artery disease with stents with her last angiogram in 2023.  She denies any leg swelling, dysuria, hematuria, diarrhea, bloody stool or any other symptoms.    Nursing Notes were all reviewed and agreed with or any disagreements were addressed in the HPI.    REVIEW OF SYSTEMS :      Review of Systems    Positives and Pertinent negatives as per HPI.     SURGICAL HISTORY     Past Surgical History:   Procedure Laterality Date    ABLATION OF DYSRHYTHMIC FOCUS  07/06/2015    Dr Burgess [facility], sinus node modification     APPENDECTOMY      CAROTID ARTERY SURGERY Right 2/14/2025    CAROTID ARTERY EXPLORATION REPAIR, EVACUATION HEMATOMA performed by Eliazar Vaughn MD at [facility] OR    CAROTID ENDARTERECTOMY Left 07/08/2014    LEFT CAROTID ENDARTERECTOMY               CAROTID ENDARTERECTOMY Right 2/13/2025    RIGHT CAROTID ENDARTERECTOMY performed by  unremarkable.  Chest x-ray imaging is unremarkable.  She is noticing that the pain is more in her upper abdomen and back.  CT imaging without contrast of the chest on the pelvis will be obtained.  Please see attending note for final disposition.    CC/HPI Summary, DDx, ED Course, and Reassessment:     Disposition Considerations (tests considered but not done, Admit vs D/C, Shared Decision Making, Pt Expectation of Test or Tx.):        I am the Primary Clinician of Record.  FINAL IMPRESSION    Chest pain  CHAPO    DISPOSITION/PLAN     DISPOSITION                 PATIENT REFERRED TO:  No follow-up provider specified.    DISCHARGE MEDICATIONS:  New Prescriptions    No medications on file       DISCONTINUED MEDICATIONS:  Discontinued Medications    No medications on file              (Please note that portions of this note were completed with a voice recognition program.  Efforts were made to edit the dictations but occasionally words are mis-transcribed.)    Juan Yeh PA-C (electronically signed)       Juan Yeh PA-C  06/06/25 2014

## 2025-06-07 NOTE — ED NOTES
Patient Name: Deidra Dumont  : 1945 79 y.o.  MRN: [id number]  ED Room #: ED-0016/16     Chief complaint:   Chief Complaint   Patient presents with    Chest Pain     Advised to come to emergency room by cardiology. N/v today, chest pain today. History of prior coronary artery diease + stents.     Hospital Problem/Diagnosis:   Hospital Problems           Last Modified POA    * (Principal) Chest pain 2025 Yes    Essential hypertension 2025 Yes    Class 1 obesity 2025 Yes    Elevated brain natriuretic peptide (BNP) level 2025 Yes    Esophagitis 2025 Yes    Acute kidney injury superimposed on stage 3b chronic kidney disease (HCC) 2025 Yes         O2 Flow Rate:O2 Device: None (Room air)   (if applicable)  Cardiac Rhythm:   (if applicable)  Active LDA's:   Peripheral IV 25 Right Forearm (Active)   Site Assessment Clean, dry & intact 25 1069            How does patient ambulate? Unknown, did not assess in the Emergency Department    2. How does patient take pills? Unknown, no oral medications were given in the Emergency Department    3. Is patient alert? Alert    4. Is patient oriented? To Person, To Place, To Time, and To Situation    5.   Patient arrived from:  home  Facility Name: ___________________________________________    6. If patient is disoriented or from a Skill Nursing Facility has family been notified of admission? No    7. Patient belongings? Belongings: Cell Phone and Clothing    Disposition of belongings? Kept with Patient     8. Any specific patient or family belongings/needs/dynamics?   a. N/a    9. Miscellaneous comments/pending orders?  a. N/a      If there are any additional questions please reach out to the Emergency Department.

## 2025-06-07 NOTE — ED PROVIDER NOTES
EMERGENCY DEPARTMENT SUPERVISING PHYSICIAN NOTE    I have seen this patient & have reviewed history and findings with the PA, NP, or resident physician and provided direct supervision. I saw the patient and performed a substantive portion of the visit. I was present for key portions of any procedures performed. I've participated in medical management, monitoring, and treatment of this patient with the provider. I have reviewed currently available documentation, test results, and laboratory results in the interim. Care plan has been developed collaboratively. I take responsibility for the patient's management from when I was asked to get involved in this patient's care. BJW and I are the primary clinicians of record.    Brief HPI:  79F states that this afternoon she was at rest when she began to experience vague, moderate to severe pain to the middle and lower portions of her left chest  Occasionally the pain will radiate into her left shoulder  Along with this pain she is noticing difficulty breathing  Later she became nauseous and vomited a couple of times  More recently she has developed vague, moderately intense pain to the center of her upper abdomen    Pertinent Exam Findings:  Awake, alert, appears somewhat chronically ill  CTAB, RRR, 2+ radial pulses  RR and WOB are normal  Abdomen soft, NT, ND    EKG My Reading:  Rate: 71  Rhythm: sinus  ST Segments: T inversion lead AVL  STEMI: no  QTc: 432 (normal)  Comparison to prior: new T wave inversion AVL when compared to 02/2025    EKG My Reading:  Rate: 74  Rhythm: sinus  ST Segments: T inversion lead AVL  STEMI: no  QTc: 457 (normal for female)  Comparison to prior: not substantially changed    Plan:  Has new T wave inversion on EKG stable on repeat EKG  Serial troponins modestly elevated above the upper limits of normal, unchanged x 2  CT imaging suspicious for esophagitis  Received aspirin, PPI, symptomatic therapies  She is in agreement with proceeding with

## 2025-06-07 NOTE — CONSULTS
[facility]  GI Consult Note    Patient: Deidra Dumont  : 1945  Acct#:      Date:  2025    Subjective:       History of Present Illness  Patient is a 79 y.o.  female whom we are consulted for esophagitis on ct scan.  Pt admitted for left chest pain, found bnp >3,000 per hospitalist note, cardio consulted. CT scan performed showing esophagitis. Pt states followed by gi ? Name in [place]. Had prior egd within past 2-3 years showing \"inflammation\" was prescribed protonix, she states didn't really help with her reflux so stopped taking it. States reflux intermittent but no dyshagia, no hematemesis. Had emesis yesterday with chest pain but nonbloody. No coffee, no etoh, has cut back cola, drinks significant tea.   Also states has had rlq discomfort intermittently \"for many years\", not new.     Past Medical History:   Diagnosis Date    Arthritis     bilateral hands, bilateral knees, neck    Awareness under anesthesia     Blood transfusion     CAD (coronary artery disease)     Coronary stent 2015    RCA stents on 3 occasions , ,     CVA (cerebral vascular accident) ([facility]) 2016    pt states determined she did not have a stroke    Febrile seizure ([facility])     Gastroesophageal reflux disease     History of blood transfusion     Hyperlipidemia     Hypertension     Septic shock ([facility]) 2023    Dec 2023 to March      Past Surgical History:   Procedure Laterality Date    ABLATION OF DYSRHYTHMIC FOCUS  2015    Dr Burgess, [facility], sinus node modification     APPENDECTOMY      CAROTID ARTERY SURGERY Right 2025    CAROTID ARTERY EXPLORATION REPAIR, EVACUATION HEMATOMA performed by Eliazar Vaughn MD at [facility] OR    CAROTID ENDARTERECTOMY Left 2014    LEFT CAROTID ENDARTERECTOMY               CAROTID ENDARTERECTOMY Right 2025    RIGHT CAROTID ENDARTERECTOMY performed by Eliazar Vaughn MD at [facility] OR    CHOLECYSTECTOMY      COLONOSCOPY      CORONARY  clear to auscultation bilaterally  Heart: regular rate and rhythm  Abdomen: soft, mild sore rlq but no rebound, no guarding.    Extremities: no edema  Skin: warm and dry  Neuro: alert and oriented      Data Review:    Recent Labs     06/06/25 1658 06/07/25  0432   WBC 12.1* 11.7*   HGB 12.3 10.9*   HCT 37.9 33.4*   MCV 94.6 94.2    256     Recent Labs     06/06/25 1658 06/07/25 0432    136   K 5.1 5.0    104   CO2 19* 22   BUN 26* 35*   CREATININE 2.0* 2.3*     Recent Labs     06/06/25 1658 06/06/25  1832 06/07/25 0432   AST 45* 39* 31   ALT 39 37 31   BILIDIR  --  <0.1  --    BILITOT 0.3 0.3 0.3   ALKPHOS 220* 195* 184*     Recent Labs     06/06/25 1832   LIPASE 37.0     No results for input(s): \"PROTIME\", \"INR\" in the last 72 hours.  Invalid input(s): \"PTT\"  No results for input(s): \"OCCULTBLD\" in the last 72 hours.               Assessment / Plan :    Chest pain: with hx cardiac disease and bnp >3,000.  Cardio evaluating.    2.  Esophagitis on ct scan: and reflux not taking ppi.  Rec  Protonix 40 mg po bid since prior qd no relief  Check serum mg and replete per primary team if low  Anti reflux diet at home, stop tea  Future egd to eval ct finding esophagitis when cardio evaluation complete either inpatient or outpatient    3. Intermittent rlq discomfort: over many years per pt. CT scan unrevealing.  Rec  F/u primary gi [facility] outpt.     4. Mild anemia: hbg similar to baseline feb 2024. No hematemesis. If develops sign gi bleed please recall.     5. Renal insuff: nephrology consulted.     Otherwise will sign off for now while undergoing cardio eval.  if pt cleared by cardiology and remains inpt can make npo after midnight Sunday and recall us Monday or tues (whenever cardio eval done) to add on egd; otherwise pt can f/u primary gi outpt egd.    Hiren Eldridge MD , MD  [facility]

## 2025-06-07 NOTE — CONSULTS
[facility]  Consultation Note    Reason for Consult: CHAPO, possible CKD 3, low serum bicarbonate  Requesting Physician:  Dr. CHRISTINA Ellington    CHIEF COMPLAINT: Chest pain    History obtained from records and patient.    HISTORY OF PRESENT ILLNESS:                Deidra Dumont  is 79 y.o. y.o. female with significant past medical history of CHAPO in setting of sepsis, requiring temporary HD, CKD 3a, baseline creatinine 1.4-1.5 anemia, CAD, CVA, GERD, hyperlipidemia, hypertension who presents with chest pain.  Radiates to the back.  Associated with nausea.  Troponin at 24.  NT proBNP at 3024.  I am asked to see the patient since creatinine is up to 2.3.  Episodes of vomiting.  No regular NSAID use.  Lowest SBP in the 90s range.  On statin as an outpatient.    Past Medical History:     has a past medical history of Arthritis, Awareness under anesthesia, Blood transfusion, CAD (coronary artery disease), Coronary stent, CVA (cerebral vascular accident) (HCC), Febrile seizure (HCC), Gastroesophageal reflux disease, History of blood transfusion, Hyperlipidemia, Hypertension, and Septic shock (HCC).   Past Surgical History:     has a past surgical history that includes Hysterectomy; Cholecystectomy; Coronary angioplasty with stent (03/2009); Appendectomy; Hand surgery; Intracapsular cataract extraction (Bilateral, 12/31/2012); fracture surgery (01/2013); eye surgery (12/19/2012); other surgical history (01/17/2014); Carotid endarterectomy (Left, 07/08/2014); ablation of dysrhythmic focus (07/06/2015); Insertable Cardiac Monitor (10/15/2014); skin biopsy; Coronary angioplasty with stent (01/24/2012); Coronary angioplasty with stent (08/27/2015); Colonoscopy (2007); Upper gastrointestinal endoscopy (06/20/2016); Hip fracture surgery (Right, 2024); Carotid endarterectomy (Right, 2/13/2025); and Carotid artery surgery (Right, 2/14/2025).   Current Medications:    Current Facility-Administered

## 2025-06-07 NOTE — H&P
without the administration of intravenous contrast. Multiplanar reformatted images are provided for review. Automated exposure control, iterative reconstruction, and/or weight based adjustment of the mA/kV was utilized to reduce the radiation dose to as low as reasonably achievable. COMPARISON: 09/08/2015, 12/02/2013 HISTORY: ORDERING SYSTEM PROVIDED HISTORY: Pain to epigastrium, lower chest, middle back preceded by repeated vomiting onset this PM TECHNOLOGIST PROVIDED HISTORY: Reason for exam:->Pain to epigastrium, lower chest, middle back preceded by repeated vomiting onset this PM Additional Contrast?->None Decision Support Exception - unselect if not a suspected or confirmed emergency medical condition->Emergency Medical Condition (MA) Reason for Exam: Pain to epigastrium, lower chest, middle back preceded by repeated vomiting onset this PM FINDINGS: Chest: Mediastinum: Three-vessel coronary artery calcification.  The heart size is normal.  No pericardial effusion.  No lymphadenopathy.  Mild wall thickening of the lower esophagus. Lungs/pleura: Stable benign solid nodules in the right lower lobe.  The lungs are otherwise the clear.  No pleural effusion or pneumothorax. Soft Tissues/Bones: No acute bone or soft tissue abnormality. Abdomen/Pelvis: Organs: Liver is normal in contour and attenuation.  The gallbladder is not seen.  No biliary dilatation.  The pancreas, adrenals, kidneys, spleen are unremarkable.  Moderate calcific atherosclerosis. GI/Bowel: Bowel is nondilated without wall thickening. Pelvis: Unremarkable. Peritoneum/Retroperitoneum: No free air, free fluid, organized fluid collection, lymphadenopathy. Bones/Soft Tissues: No acute bone or soft tissue abnormality.     1. No acute process. 2.  Mild wall thickening of the esophagus.  Recommend clinical correlation for esophagitis and consider EGD.     XR CHEST PORTABLE  Result Date: 6/6/2025  EXAMINATION: ONE XRAY VIEW OF THE CHEST 6/6/2025 4:55 pm  COMPARISON: 05/06/2016. HISTORY: ORDERING SYSTEM PROVIDED HISTORY: Chest Pain TECHNOLOGIST PROVIDED HISTORY: Reason for exam:->Chest Pain FINDINGS: A calcified granuloma in the right upper lobe is again noted.  There is no airspace consolidation or pleural effusion.  The cardiomediastinal silhouette, pulmonary vessels and interstitium appear normal.     No radiographic evidence of an acute cardiopulmonary process.         Electronically signed by Mikey Chatterjee MD on 6/6/2025 at 10:26 PM

## 2025-06-07 NOTE — PROGRESS NOTES
Hospitalist Progress Note      Name:  Deidra Dumont /Age/Sex: 1945  (79 y.o. female)   MRN & CSN:  [id number] & [id number] Encounter Date/Time: 2025 8:41 AM EDT   Location:  [id number] PCP: Ilya Lomax MD     Attending:Richie Ellington MD       Hospital Day: 2    Subjective:   Chief Complaint:   Chief Complaint   Patient presents with    Chest Pain     Advised to come to emergency room by cardiology. N/v today, chest pain today. History of prior coronary artery diease + stents.     Deidra Dumont is a 79 y.o. female with a past medical history of hypertension, hyperlipidemia, CVA, CAD, arthritis, febrile seizure, CKDIIIb, GERD who presented with chest pain. Seen in cardiology office  with complaints of CP and also with hypotension and she was sent to the ED. Hx of complex CAD with multiple PCI.  CT showed possible esphagitis, started on protonix and GI consutled.  Also noted to have CHAPO, nephrology consulted.      Interval History:  Today, she is resting in bed, reports that she had several episodes of vomiting yesterday and had more intense chest pain that radiated through her back.  Denies diarrhea.  Admits to dark stools with oral iron use.     Independently reviewed interval ancillary notes from emergency medicine.     Assessment and Recommendations   Problem List  Principal Problem:    Chest pain  Active Problems:    Essential hypertension    Class 1 obesity    Elevated brain natriuretic peptide (BNP) level    Esophagitis    Acute kidney injury superimposed on stage 3b chronic kidney disease (HCC)    Elevated alkaline phosphatase level  Resolved Problems:    * No resolved hospital problems. *     Assessment and Plan:    Chest Pain  CAD   - Complains of chronic angina, however she had episode of more intense CP yesterday   - PTCA RCA , ,    - PTCA OM    - RCA intent stenosis    - ECG with no acute ST changes    - Add TSH   - BNP 3,024, admits to  Cultures: No results found for: \"LABURIN\"  Blood Cultures: No results found for: \"BC\"  No results found for: \"BLOODCULT2\"  Organism: No results found for: \"ORG\"    Personally reviewed labs, diagnostic, device, and imaging results reviewed as a part of this visit    Electronically signed by MARIELENA Machado CNP on 6/7/2025 at 8:41 AM

## 2025-06-07 NOTE — CONSULTS
[facility]   CONSULTATION  [phone]      Chief Complaint   Patient presents with    Chest Pain     Advised to come to emergency room by cardiology. N/v today, chest pain today. History of prior coronary artery diease + stents.            History of Present Illness:  Deidra Dumont is a 79 y.o. patient who presented to the hospital with complaints of chest pain. I have been asked to provide consultation regarding further management and testing. The patient reports that she has been having shortness of breath. She reports that she came to the hospital for exertional chest pain and vomiting. She states that it lasted approximately 2 hours. She states that it feels different from her prior cardiac symptoms. She isnt sure if it is related to a chicken salad. She denies any radiation to shoulder, jaw, arm. No associated diaphoresis.       Past Medical History:   has a past medical history of Arthritis, Awareness under anesthesia, Blood transfusion, CAD (coronary artery disease), Coronary stent, CVA (cerebral vascular accident) (HCC), Febrile seizure (HCC), Gastroesophageal reflux disease, History of blood transfusion, Hyperlipidemia, Hypertension, and Septic shock ([facility]).    Surgical History:   has a past surgical history that includes Hysterectomy; Cholecystectomy; Coronary angioplasty with stent (03/2009); Appendectomy; Hand surgery; Intracapsular cataract extraction (Bilateral, 12/31/2012); fracture surgery (01/2013); eye surgery (12/19/2012); other surgical history (01/17/2014); Carotid endarterectomy (Left, 07/08/2014); ablation of dysrhythmic focus (07/06/2015); Insertable Cardiac Monitor (10/15/2014); skin biopsy; Coronary angioplasty with stent (01/24/2012); Coronary angioplasty with stent (08/27/2015); Colonoscopy (2007); Upper gastrointestinal endoscopy (06/20/2016); Hip fracture surgery (Right, 2024); Carotid endarterectomy (Right, 2/13/2025); and Carotid artery surgery (Right,

## 2025-06-08 LAB
ANION GAP SERPL CALCULATED.3IONS-SCNC: 14 MMOL/L (ref 3–16)
BASE EXCESS BLDV CALC-SCNC: -7.1 MMOL/L (ref -3–3)
BASOPHILS # BLD: 0.1 K/UL (ref 0–0.2)
BASOPHILS NFR BLD: 0.8 %
BUN SERPL-MCNC: 32 MG/DL (ref 7–20)
CALCIUM SERPL-MCNC: 8.9 MG/DL (ref 8.3–10.6)
CHLORIDE SERPL-SCNC: 108 MMOL/L (ref 99–110)
CK SERPL-CCNC: 84 U/L (ref 26–192)
CO2 BLDV-SCNC: 38 MMOL/L
CO2 SERPL-SCNC: 16 MMOL/L (ref 21–32)
COHGB MFR BLDV: 6.1 % (ref 0–1.5)
CREAT SERPL-MCNC: 1.8 MG/DL (ref 0.6–1.2)
DEPRECATED RDW RBC AUTO: 15.1 % (ref 12.4–15.4)
EKG ATRIAL RATE: 101 BPM
EKG ATRIAL RATE: 102 BPM
EKG DIAGNOSIS: NORMAL
EKG DIAGNOSIS: NORMAL
EKG P AXIS: 62 DEGREES
EKG P AXIS: 71 DEGREES
EKG P-R INTERVAL: 150 MS
EKG P-R INTERVAL: 160 MS
EKG Q-T INTERVAL: 338 MS
EKG Q-T INTERVAL: 342 MS
EKG QRS DURATION: 90 MS
EKG QRS DURATION: 92 MS
EKG QTC CALCULATION (BAZETT): 440 MS
EKG QTC CALCULATION (BAZETT): 443 MS
EKG R AXIS: 16 DEGREES
EKG R AXIS: 40 DEGREES
EKG T AXIS: 66 DEGREES
EKG T AXIS: 68 DEGREES
EKG VENTRICULAR RATE: 101 BPM
EKG VENTRICULAR RATE: 102 BPM
EOSINOPHIL # BLD: 0.4 K/UL (ref 0–0.6)
EOSINOPHIL NFR BLD: 4.1 %
GFR SERPLBLD CREATININE-BSD FMLA CKD-EPI: 28 ML/MIN/{1.73_M2}
GLUCOSE SERPL-MCNC: 119 MG/DL (ref 70–99)
HCO3 BLDV-SCNC: 16 MMOL/L (ref 23–29)
HCT VFR BLD AUTO: 32.2 % (ref 36–48)
HGB BLD-MCNC: 10.5 G/DL (ref 12–16)
LYMPHOCYTES # BLD: 2.3 K/UL (ref 1–5.1)
LYMPHOCYTES NFR BLD: 27.2 %
MAGNESIUM SERPL-MCNC: 2.3 MG/DL (ref 1.8–2.4)
MCH RBC QN AUTO: 30.5 PG (ref 26–34)
MCHC RBC AUTO-ENTMCNC: 32.7 G/DL (ref 31–36)
MCV RBC AUTO: 93.2 FL (ref 80–100)
METHGB MFR BLDV: 0.5 %
MONOCYTES # BLD: 0.9 K/UL (ref 0–1.3)
MONOCYTES NFR BLD: 10.9 %
NEUTROPHILS # BLD: 4.9 K/UL (ref 1.7–7.7)
NEUTROPHILS NFR BLD: 57 %
O2 CT VFR BLDV CALC: 14 VOL %
O2 THERAPY: ABNORMAL
PCO2 BLDV: 24.5 MMHG (ref 40–50)
PH BLDV: 7.42 [PH] (ref 7.35–7.45)
PLATELET # BLD AUTO: 224 K/UL (ref 135–450)
PMV BLD AUTO: 8.7 FL (ref 5–10.5)
PO2 BLDV: 199 MMHG (ref 25–40)
POTASSIUM SERPL-SCNC: 5.1 MMOL/L (ref 3.5–5.1)
POTASSIUM SERPL-SCNC: 5.1 MMOL/L (ref 3.5–5.1)
RBC # BLD AUTO: 3.45 M/UL (ref 4–5.2)
SAO2 % BLDV: 100 %
SODIUM SERPL-SCNC: 138 MMOL/L (ref 136–145)
TROPONIN, HIGH SENSITIVITY: 22 NG/L (ref 0–14)
WBC # BLD AUTO: 8.5 K/UL (ref 4–11)

## 2025-06-08 PROCEDURE — 80048 BASIC METABOLIC PNL TOTAL CA: CPT

## 2025-06-08 PROCEDURE — 6370000000 HC RX 637 (ALT 250 FOR IP): Performed by: HOSPITALIST

## 2025-06-08 PROCEDURE — 92610 EVALUATE SWALLOWING FUNCTION: CPT

## 2025-06-08 PROCEDURE — 83735 ASSAY OF MAGNESIUM: CPT

## 2025-06-08 PROCEDURE — 6370000000 HC RX 637 (ALT 250 FOR IP): Performed by: INTERNAL MEDICINE

## 2025-06-08 PROCEDURE — 6360000002 HC RX W HCPCS

## 2025-06-08 PROCEDURE — 84132 ASSAY OF SERUM POTASSIUM: CPT

## 2025-06-08 PROCEDURE — 1200000000 HC SEMI PRIVATE

## 2025-06-08 PROCEDURE — 36415 COLL VENOUS BLD VENIPUNCTURE: CPT

## 2025-06-08 PROCEDURE — 2500000003 HC RX 250 WO HCPCS: Performed by: HOSPITALIST

## 2025-06-08 PROCEDURE — 99232 SBSQ HOSP IP/OBS MODERATE 35: CPT | Performed by: NURSE PRACTITIONER

## 2025-06-08 PROCEDURE — 85025 COMPLETE CBC W/AUTO DIFF WBC: CPT

## 2025-06-08 PROCEDURE — 82550 ASSAY OF CK (CPK): CPT

## 2025-06-08 PROCEDURE — 2500000003 HC RX 250 WO HCPCS: Performed by: NURSE PRACTITIONER

## 2025-06-08 PROCEDURE — 93005 ELECTROCARDIOGRAM TRACING: CPT | Performed by: INTERNAL MEDICINE

## 2025-06-08 PROCEDURE — 6360000002 HC RX W HCPCS: Performed by: NURSE PRACTITIONER

## 2025-06-08 PROCEDURE — 82803 BLOOD GASES ANY COMBINATION: CPT

## 2025-06-08 PROCEDURE — 93010 ELECTROCARDIOGRAM REPORT: CPT | Performed by: STUDENT IN AN ORGANIZED HEALTH CARE EDUCATION/TRAINING PROGRAM

## 2025-06-08 PROCEDURE — 84484 ASSAY OF TROPONIN QUANT: CPT

## 2025-06-08 PROCEDURE — 6370000000 HC RX 637 (ALT 250 FOR IP): Performed by: NURSE PRACTITIONER

## 2025-06-08 PROCEDURE — 6360000002 HC RX W HCPCS: Performed by: HOSPITALIST

## 2025-06-08 RX ORDER — NITROGLYCERIN 0.4 MG/1
0.4 TABLET SUBLINGUAL EVERY 5 MIN PRN
Status: DISCONTINUED | OUTPATIENT
Start: 2025-06-08 | End: 2025-06-08

## 2025-06-08 RX ORDER — HYDROMORPHONE HYDROCHLORIDE 1 MG/ML
0.5 INJECTION, SOLUTION INTRAMUSCULAR; INTRAVENOUS; SUBCUTANEOUS
Status: COMPLETED | OUTPATIENT
Start: 2025-06-08 | End: 2025-06-08

## 2025-06-08 RX ADMIN — METHOCARBAMOL 500 MG: 500 TABLET ORAL at 14:23

## 2025-06-08 RX ADMIN — HYDROCODONE BITARTRATE AND ACETAMINOPHEN 1 TABLET: 10; 325 TABLET ORAL at 11:18

## 2025-06-08 RX ADMIN — Medication 10 ML: at 20:56

## 2025-06-08 RX ADMIN — ROSUVASTATIN CALCIUM 20 MG: 20 TABLET, FILM COATED ORAL at 20:55

## 2025-06-08 RX ADMIN — METHOCARBAMOL 500 MG: 500 TABLET ORAL at 08:51

## 2025-06-08 RX ADMIN — PANTOPRAZOLE SODIUM 40 MG: 40 INJECTION, POWDER, FOR SOLUTION INTRAVENOUS at 20:55

## 2025-06-08 RX ADMIN — HYDROCODONE BITARTRATE AND ACETAMINOPHEN 1 TABLET: 10; 325 TABLET ORAL at 22:24

## 2025-06-08 RX ADMIN — DIPHENHYDRAMINE HYDROCHLORIDE 25 MG: 25 TABLET ORAL at 19:24

## 2025-06-08 RX ADMIN — HEPARIN SODIUM 5000 UNITS: 5000 INJECTION INTRAVENOUS; SUBCUTANEOUS at 14:24

## 2025-06-08 RX ADMIN — WATER 1000 MG: 1 INJECTION INTRAMUSCULAR; INTRAVENOUS; SUBCUTANEOUS at 20:55

## 2025-06-08 RX ADMIN — HYDROCODONE BITARTRATE AND ACETAMINOPHEN 1 TABLET: 10; 325 TABLET ORAL at 00:09

## 2025-06-08 RX ADMIN — PANTOPRAZOLE SODIUM 40 MG: 40 INJECTION, POWDER, FOR SOLUTION INTRAVENOUS at 08:52

## 2025-06-08 RX ADMIN — VENLAFAXINE HYDROCHLORIDE 75 MG: 37.5 TABLET ORAL at 08:51

## 2025-06-08 RX ADMIN — Medication 10 ML: at 08:52

## 2025-06-08 RX ADMIN — METHOCARBAMOL 500 MG: 500 TABLET ORAL at 18:00

## 2025-06-08 RX ADMIN — ASPIRIN 81 MG: 81 TABLET, CHEWABLE ORAL at 08:52

## 2025-06-08 RX ADMIN — HYDROMORPHONE HYDROCHLORIDE 0.5 MG: 1 INJECTION, SOLUTION INTRAMUSCULAR; INTRAVENOUS; SUBCUTANEOUS at 02:05

## 2025-06-08 RX ADMIN — VENLAFAXINE HYDROCHLORIDE 75 MG: 37.5 TABLET ORAL at 20:55

## 2025-06-08 RX ADMIN — ISOSORBIDE MONONITRATE 15 MG: 30 TABLET, EXTENDED RELEASE ORAL at 08:51

## 2025-06-08 RX ADMIN — HEPARIN SODIUM 5000 UNITS: 5000 INJECTION INTRAVENOUS; SUBCUTANEOUS at 20:56

## 2025-06-08 ASSESSMENT — PAIN DESCRIPTION - DESCRIPTORS
DESCRIPTORS: DISCOMFORT
DESCRIPTORS: BURNING

## 2025-06-08 ASSESSMENT — PAIN SCALES - GENERAL
PAINLEVEL_OUTOF10: 0
PAINLEVEL_OUTOF10: 7
PAINLEVEL_OUTOF10: 7
PAINLEVEL_OUTOF10: 3
PAINLEVEL_OUTOF10: 0
PAINLEVEL_OUTOF10: 5
PAINLEVEL_OUTOF10: 1
PAINLEVEL_OUTOF10: 6
PAINLEVEL_OUTOF10: 8
PAINLEVEL_OUTOF10: 7

## 2025-06-08 ASSESSMENT — PAIN DESCRIPTION - LOCATION
LOCATION: ABDOMEN
LOCATION: CHEST
LOCATION: ABDOMEN

## 2025-06-08 ASSESSMENT — PAIN DESCRIPTION - ORIENTATION: ORIENTATION: RIGHT

## 2025-06-08 ASSESSMENT — PAIN SCALES - WONG BAKER
WONGBAKER_NUMERICALRESPONSE: NO HURT
WONGBAKER_NUMERICALRESPONSE: NO HURT

## 2025-06-08 NOTE — PROGRESS NOTES
years ago. Her smoking use included cigarettes. She started smoking about 61 years ago. She has a 0.5 pack-year smoking history. She has never used smokeless tobacco. She reports current alcohol use. She reports that she does not use drugs.    Subjective:    Nausea and vomiting better    Review of Systems   Constitutional:  Negative for activity change, appetite change, chills, fatigue, fever and unexpected weight change.   HENT:  Negative for congestion and facial swelling.    Eyes:  Negative for photophobia, discharge and redness.   Respiratory:  Negative for cough, chest tightness and shortness of breath.    Cardiovascular:  Negative for chest pain, palpitations and leg swelling.   Gastrointestinal:  Negative for abdominal distention, abdominal pain, blood in stool, constipation, diarrhea, nausea and vomiting.   Endocrine: Negative for cold intolerance, heat intolerance and polyuria.   Genitourinary:  Negative for decreased urine volume, difficulty urinating, flank pain and hematuria.   Musculoskeletal:  Negative for joint swelling and neck pain.   Neurological:  Negative for dizziness, seizures, syncope, speech difficulty, light-headedness and headaches.   Hematological:  Does not bruise/bleed easily.   Psychiatric/Behavioral:  Negative for agitation, confusion and hallucinations.        Objective:      /72   Pulse 87   Temp 98.1 °F (36.7 °C) (Temporal)   Resp 16   Ht 1.549 m (5' 1\")   Wt 74 kg (163 lb 3.2 oz)   SpO2 98%   BMI 30.84 kg/m²     Wt Readings from Last 3 Encounters:   06/08/25 74 kg (163 lb 3.2 oz)   06/06/25 72.1 kg (159 lb)   03/07/25 72.1 kg (159 lb)       BP Readings from Last 3 Encounters:   06/08/25 131/72   06/06/25 (!) 80/62   03/07/25 120/72         Chest- clear  Heart-regular  Abd-soft  Ext- no edema    Labs  Hemoglobin   Date Value Ref Range Status   06/08/2025 10.5 (L) 12.0 - 16.0 g/dL Final     Hematocrit   Date Value Ref Range Status   06/08/2025 32.2 (L) 36.0 - 48.0 %  Final     WBC   Date Value Ref Range Status   06/08/2025 8.5 4.0 - 11.0 K/uL Final     Platelets   Date Value Ref Range Status   06/08/2025 224 135 - 450 K/uL Final     Lab Results   Component Value Date    CREATININE 1.8 (H) 06/08/2025    BUN 32 (H) 06/08/2025     06/08/2025    K 5.1 06/08/2025    K 5.1 06/08/2025     06/08/2025    CO2 16 (L) 06/08/2025     Urinalysis specific gravity of 1.010, pH 5, protein 30, blood negative, WBC 31    VBG 7.42/24.5      Assessment/Plan:  1.  CHAPO on CKD 3a- baseline creatinine 1.4-1.5.   Probably from prerenal azotemia.  History of CHAPO requiring HD with recovery.  Better  2.  Low serum bicarbonate-VBG showing respiratory alkalosis.  Stop NSS for now.  No need for bicarbonate replacement  3.  Hypotension-better.  Follow echocardiogram.  Does not appear hypervolemic at this time.  proBNP could be elevated in the setting of CHAPO.  4.  Anemia-follow hemoglobin closely  5.  History of CAD-on statin.  Check CK level  6.  Chest pain-continue PPI.  Cardiology following.  Planning for stress test.    Garth Casas MD

## 2025-06-08 NOTE — PROGRESS NOTES
Pt A&Ox 4 on RA. AM assessment and vitals completed and put into flowsheets. AM medications given with no s/s of aspiration. Patient takes pills whole with water. Pt with no questions or concerns voiced to RN at this time. Fall precautions in place and call light within reach.   Vitals:    06/08/25 0849   BP: (!) 145/69   Pulse: 97   Resp: 18   Temp: 98.1 °F (36.7 °C)   SpO2: 98%

## 2025-06-08 NOTE — PROGRESS NOTES
Hospitalist Progress Note      Name:  Deidra Dumont /Age/Sex: 1945  (79 y.o. female)   MRN & CSN:  [id number] & [id number] Encounter Date/Time: 2025 8:41 AM EDT   Location:  [id number] PCP: Ilya Lomax MD     Attending:Richie Ellington MD       Hospital Day: 3    Subjective:   Chief Complaint:   Chief Complaint   Patient presents with    Chest Pain     Advised to come to emergency room by cardiology. N/v today, chest pain today. History of prior coronary artery diease + stents.     Deidra Dumont is a 79 y.o. female with a past medical history of hypertension, hyperlipidemia, CVA, CAD, arthritis, febrile seizure, CKDIIIb, GERD who presented with chest pain. Seen in cardiology office  with complaints of CP and also with hypotension and she was sent to the ED. Hx of complex CAD with multiple PCI.  CT showed possible esphagitis, started on protonix and GI consutled.  Also noted to have CHAPO, nephrology consulted.      Interval History:  Today, she is resting in bed.  Concerned about the RLQ pain, feels she needs an MRI.  Denies CP or SOB today. No swelling.      Independently reviewed interval ancillary notes from cardiology and gastroenterology.     Assessment and Recommendations   Problem List  Principal Problem:    Chest pain  Active Problems:    Essential hypertension    Class 1 obesity    Elevated brain natriuretic peptide (BNP) level    Esophagitis    Acute kidney injury superimposed on stage 3b chronic kidney disease (HCC)    Elevated alkaline phosphatase level  Resolved Problems:    * No resolved hospital problems. *     Assessment and Plan:    Chest Pain  CAD   - Complains of chronic angina, however she had episode of more intense CP yesterday   - PTCA RCA , ,    - PTCA OM    - RCA intent stenosis    - ECG with no acute ST changes    - TSH 2.81   - BNP elevated, appears euvolemic   - Cardiology following- planning stress test in am  - Advance diet

## 2025-06-08 NOTE — PROGRESS NOTES
Pt with radha at bedside. This RN notified patient of no caffeine order due to stress test. Pt states she will finish this one then not have any more.

## 2025-06-08 NOTE — PROGRESS NOTES
Speech Language Pathology  [facility]  [phone]  SLP Clinical Swallow Evaluation       Patient: Deidra Dumont   : 1945   MRN: [id number]      Evaluation Date: 2025      Admitting Dx: Chest pain [R07.9]  Difficulty breathing [R06.89]  Upper abdominal pain [R10.10]  Chest pain, unspecified type [R07.9]  Nausea and vomiting, unspecified vomiting type [R11.2]  Treatment Diagnosis: Oropharyngeal Dysphagia   Pain: Did not state                                  Recommendations      Recommended Diet and Intervention 2025:  Diet Solids Recommendation:  Regular texture diet  Liquid Consistency Recommendation:  Thin liquids  Recommended form of Meds: Meds whole with water or Meds in puree          Compensatory strategies: Aspiration Precautions , Eat or Feed Slowly, Reflux Precautions , Remain Upright 30-45 min , Small Bites and Sips , Upright as possible with all PO intake     Discharge Recommendations:  Discharge recommendations to be determined pending ongoing follow-up during acute care stay    History/Course of Treatment     H&P: 2025  \"Deidra Dumont is a 79 y.o. female  with a pmh of class I obesity, essential hypertension, CKD stage IIIb, and CAD with coronary stents who presents with excruciating left-sided chest pain that radiates to her back.  Associated with her symptoms is nausea.  Patient went to her PCPs office for routine appointment and was instructed to come to the emergency department.  Patient reports that outpatient stress test was being considered.\"    Imaging:  Chest X-ray: 2025  IMPRESSION:  No radiographic evidence of an acute cardiopulmonary process.    CT Chest: 2025  IMPRESSION:  1. No acute process.  2.  Mild wall thickening of the esophagus.  Recommend clinical correlation  for esophagitis and consider EGD.    Prior Fiberoptic Endoscopic Evaluation of Swallowing (FEES) 2024  Patient presents with

## 2025-06-08 NOTE — PROGRESS NOTES
[facility]  DAILY PROGRESS NOTE    Admit Date: 6/6/2025       Chief Complaint: chest pain     Interval History:   Patient seen and examined and notes reviewed. Patient is being followed for CAD. Today she c/o CP last night about 3am, radiation to shoulder, resolved this am, c/o DELGADO but denies palpitations or dizziness. Nausea is improved    In: -   Out: 1175    Wt Readings from Last 2 Encounters:   06/08/25 74 kg (163 lb 3.2 oz)   06/06/25 72.1 kg (159 lb)       Data:   Scheduled Meds:   Scheduled Meds:   heparin (porcine)  5,000 Units SubCUTAneous 3 times per day    isosorbide mononitrate  15 mg Oral Daily    rosuvastatin  20 mg Oral Nightly    methocarbamol  500 mg Oral TID    venlafaxine  75 mg Oral BID    cefTRIAXone (ROCEPHIN) IV  1,000 mg IntraVENous Q24H    sodium chloride flush  5-40 mL IntraVENous 2 times per day    aspirin  81 mg Oral Daily    pantoprazole  40 mg IntraVENous BID     Continuous Infusions:   sodium chloride 80 mL/hr at 06/07/25 1501    sodium chloride       PRN Meds:.diphenhydrAMINE, HYDROcodone-acetaminophen, sodium chloride flush, sodium chloride, ondansetron **OR** ondansetron, acetaminophen **OR** acetaminophen, polyethylene glycol, sulfur hexafluoride microspheres  Continuous Infusions:   sodium chloride 80 mL/hr at 06/07/25 1501    sodium chloride         Intake/Output Summary (Last 24 hours) at 6/8/2025 0854  Last data filed at 6/8/2025 0837  Gross per 24 hour   Intake --   Output 1175 ml   Net -1175 ml     Lab Data:  CBC:   Lab Results   Component Value Date/Time    WBC 8.5 06/08/2025 01:00 AM    HGB 10.5 06/08/2025 01:00 AM     06/08/2025 01:00 AM     BMP:  Lab Results   Component Value Date/Time     06/08/2025 01:00 AM    K 5.1 06/08/2025 01:00 AM    K 5.1 06/08/2025 01:00 AM    K 5.0 06/07/2025 04:32 AM     06/08/2025 01:00 AM    CO2 16 06/08/2025 01:00 AM    BUN 32 06/08/2025 01:00 AM    CREATININE 1.8 06/08/2025 01:00 AM    GLUCOSE 119 06/08/2025  01:00 AM     INR:   Lab Results   Component Value Date/Time    INR 1.01 02/14/2025 06:54 PM    INR 0.93 01/18/2016 09:44 PM    INR 1.01 09/27/2015 07:40 AM        CARDIAC LABS  ENZYMES:No results for input(s): \"CKMB\", \"CKMBINDEX\", \"TROPONINI\" in the last 72 hours.    Invalid input(s): \"CKTOTAL;3\"  FASTING LIPID PANEL:  Lab Results   Component Value Date/Time    HDL 39 06/07/2025 04:32 AM    HDL 42 01/25/2012 04:18 AM    LDLDIRECT 88 05/02/2023 03:54 PM    TRIG 272 06/07/2025 04:32 AM    TSH 1.06 09/21/2015 05:23 AM    TSH 3.85 09/22/2014 06:47 PM     LIVER PROFILE:  Lab Results   Component Value Date/Time    AST 31 06/07/2025 04:32 AM    AST 39 06/06/2025 06:32 PM    ALT 31 06/07/2025 04:32 AM    ALT 37 06/06/2025 06:32 PM     BNP:   Lab Results   Component Value Date/Time    PROBNP 3,024 06/06/2025 04:58 PM    PROBNP 1,307 02/14/2025 06:54 PM    PROBNP 294 05/02/2023 03:54 PM    PROBNP 712 09/14/2015 11:34 AM    PROBNP 46 08/27/2015 12:35 AM     Iron Studies:    Lab Results   Component Value Date/Time    TIBC 309 06/07/2025 04:32 AM    TIBC 485 12/13/2014 02:57 PM    TIBC 464 06/06/2014 04:23 PM    FERRITIN 92 12/13/2014 02:57 PM    FERRITIN 32 06/06/2014 04:23 PM    FERRITIN 33 12/19/2013 01:37 PM         1. WEIGHT: Admit Weight - Scale: 72.1 kg (159 lb)      Today  Weight - Scale: 74 kg (163 lb 3.2 oz)   2. I/O   Intake/Output Summary (Last 24 hours) at 6/8/2025 0854  Last data filed at 6/8/2025 0837  Gross per 24 hour   Intake --   Output 1175 ml   Net -1175 ml       Cardiac Testing:   Echo: Dec '24  SUMMARY:   1. Left ventricle: The cavity size is normal. Wall thickness is normal.      Systolic function is normal. The estimated ejection fraction is 60-65%.      Wall motion is normal; there are no regional wall motion abnormalities.      Unable to assess diastolic function.   2. Aortic valve: The annulus is mildly calcified. There is mild stenosis.      There is mild regurgitation. The peak systolic velocity is

## 2025-06-08 NOTE — PLAN OF CARE
Problem: Chronic Conditions and Co-morbidities  Goal: Patient's chronic conditions and co-morbidity symptoms are monitored and maintained or improved  6/8/2025 1030 by Gely Tenorio RN  Outcome: Progressing  Flowsheets (Taken 6/8/2025 0849)  Care Plan - Patient's Chronic Conditions and Co-Morbidity Symptoms are Monitored and Maintained or Improved:   Monitor and assess patient's chronic conditions and comorbid symptoms for stability, deterioration, or improvement   Collaborate with multidisciplinary team to address chronic and comorbid conditions and prevent exacerbation or deterioration   Update acute care plan with appropriate goals if chronic or comorbid symptoms are exacerbated and prevent overall improvement and discharge  6/8/2025 0533 by Fran Richards RN  Outcome: Progressing     Problem: Discharge Planning  Goal: Discharge to home or other facility with appropriate resources  Outcome: Progressing  Flowsheets (Taken 6/8/2025 0849)  Discharge to home or other facility with appropriate resources: Refer to discharge planning if patient needs post-hospital services based on physician order or complex needs related to functional status, cognitive ability or social support system     Problem: Pain  Goal: Verbalizes/displays adequate comfort level or baseline comfort level  Outcome: Progressing     Problem: ABCDS Injury Assessment  Goal: Absence of physical injury  6/8/2025 1030 by Gely Tenorio RN  Outcome: Progressing  6/8/2025 0533 by Fran Richards RN  Outcome: Progressing     Problem: Safety - Adult  Goal: Free from fall injury  6/8/2025 1030 by Gely Tenorio RN  Outcome: Progressing  6/8/2025 0533 by Fran Richards, RN  Outcome: Progressing

## 2025-06-09 ENCOUNTER — APPOINTMENT (OUTPATIENT)
Dept: NUCLEAR MEDICINE | Age: 80
DRG: 322 | End: 2025-06-09
Payer: MEDICARE

## 2025-06-09 ENCOUNTER — TELEPHONE (OUTPATIENT)
Dept: CARDIOLOGY CLINIC | Age: 80
End: 2025-06-09

## 2025-06-09 ENCOUNTER — APPOINTMENT (OUTPATIENT)
Age: 80
DRG: 322 | End: 2025-06-09
Attending: HOSPITALIST
Payer: MEDICARE

## 2025-06-09 LAB
ANION GAP SERPL CALCULATED.3IONS-SCNC: 11 MMOL/L (ref 3–16)
ANION GAP SERPL CALCULATED.3IONS-SCNC: 13 MMOL/L (ref 3–16)
BACTERIA UR CULT: ABNORMAL
BASOPHILS # BLD: 0.1 K/UL (ref 0–0.2)
BASOPHILS NFR BLD: 1 %
BUN SERPL-MCNC: 20 MG/DL (ref 7–20)
BUN SERPL-MCNC: 20 MG/DL (ref 7–20)
CALCIUM SERPL-MCNC: 9.8 MG/DL (ref 8.3–10.6)
CALCIUM SERPL-MCNC: 9.8 MG/DL (ref 8.3–10.6)
CHLORIDE SERPL-SCNC: 109 MMOL/L (ref 99–110)
CHLORIDE SERPL-SCNC: 110 MMOL/L (ref 99–110)
CO2 SERPL-SCNC: 18 MMOL/L (ref 21–32)
CO2 SERPL-SCNC: 19 MMOL/L (ref 21–32)
CREAT SERPL-MCNC: 1.3 MG/DL (ref 0.6–1.2)
CREAT SERPL-MCNC: 1.3 MG/DL (ref 0.6–1.2)
D-DIMER QUANTITATIVE: 0.93 UG/ML FEU (ref 0–0.6)
DEPRECATED RDW RBC AUTO: 14.6 % (ref 12.4–15.4)
ECHO AO ASC DIAM: 3.1 CM
ECHO AO ASCENDING AORTA INDEX: 1.8 CM/M2
ECHO AO ROOT DIAM: 2.5 CM
ECHO AO ROOT INDEX: 1.45 CM/M2
ECHO AR MAX VEL PISA: 3 M/S
ECHO AV AREA PEAK VELOCITY: 1.7 CM2
ECHO AV AREA VTI: 1.7 CM2
ECHO AV AREA/BSA PEAK VELOCITY: 1 CM2/M2
ECHO AV AREA/BSA VTI: 1 CM2/M2
ECHO AV MEAN GRADIENT: 7 MMHG
ECHO AV MEAN GRADIENT: 7 MMHG
ECHO AV MEAN VELOCITY: 1.3 M/S
ECHO AV PEAK GRADIENT: 13 MMHG
ECHO AV PEAK VELOCITY: 1.8 M/S
ECHO AV REGURGITANT PHT: 462 MS
ECHO AV VELOCITY RATIO: 0.72
ECHO AV VTI: 36.1 CM
ECHO BSA: 1.77 M2
ECHO EST RA PRESSURE: 3 MMHG
ECHO LA AREA 2C: 16.9 CM2
ECHO LA AREA 4C: 17.4 CM2
ECHO LA DIAMETER INDEX: 2.27 CM/M2
ECHO LA DIAMETER: 3.9 CM
ECHO LA MAJOR AXIS: 4.7 CM
ECHO LA MINOR AXIS: 4.6 CM
ECHO LA TO AORTIC ROOT RATIO: 1.56
ECHO LA VOL BP: 51 ML (ref 22–52)
ECHO LA VOL MOD A2C: 49 ML (ref 22–52)
ECHO LA VOL MOD A4C: 52 ML (ref 22–52)
ECHO LA VOL/BSA BIPLANE: 30 ML/M2 (ref 16–34)
ECHO LA VOLUME INDEX MOD A2C: 28 ML/M2 (ref 16–34)
ECHO LA VOLUME INDEX MOD A4C: 30 ML/M2 (ref 16–34)
ECHO LV E' LATERAL VELOCITY: 10.7 CM/S
ECHO LV E' SEPTAL VELOCITY: 5.44 CM/S
ECHO LV EDV A2C: 103 ML
ECHO LV EDV A4C: 112 ML
ECHO LV EDV INDEX A4C: 65 ML/M2
ECHO LV EDV NDEX A2C: 60 ML/M2
ECHO LV EF PHYSICIAN: 65 %
ECHO LV EJECTION FRACTION A2C: 53 %
ECHO LV EJECTION FRACTION A4C: 57 %
ECHO LV EJECTION FRACTION BIPLANE: 55 % (ref 55–100)
ECHO LV ESV A2C: 49 ML
ECHO LV ESV A4C: 49 ML
ECHO LV ESV INDEX A2C: 28 ML/M2
ECHO LV ESV INDEX A4C: 28 ML/M2
ECHO LV FRACTIONAL SHORTENING: 29 % (ref 28–44)
ECHO LV INTERNAL DIMENSION DIASTOLE INDEX: 2.03 CM/M2
ECHO LV INTERNAL DIMENSION DIASTOLIC: 3.5 CM (ref 3.9–5.3)
ECHO LV INTERNAL DIMENSION SYSTOLIC INDEX: 1.45 CM/M2
ECHO LV INTERNAL DIMENSION SYSTOLIC: 2.5 CM
ECHO LV IVSD: 1.5 CM (ref 0.6–0.9)
ECHO LV MASS 2D: 183 G (ref 67–162)
ECHO LV MASS INDEX 2D: 106.4 G/M2 (ref 43–95)
ECHO LV POSTERIOR WALL DIASTOLIC: 1.4 CM (ref 0.6–0.9)
ECHO LV RELATIVE WALL THICKNESS RATIO: 0.8
ECHO LVOT AREA: 2.3 CM2
ECHO LVOT AV VTI INDEX: 0.76
ECHO LVOT DIAM: 1.7 CM
ECHO LVOT MEAN GRADIENT: 4 MMHG
ECHO LVOT PEAK GRADIENT: 7 MMHG
ECHO LVOT PEAK VELOCITY: 1.3 M/S
ECHO LVOT STROKE VOLUME INDEX: 36 ML/M2
ECHO LVOT SV: 61.9 ML
ECHO LVOT VTI: 27.3 CM
ECHO MV A VELOCITY: 1.38 M/S
ECHO MV E VELOCITY: 0.95 M/S
ECHO MV E/A RATIO: 0.69
ECHO MV E/E' LATERAL: 8.88
ECHO MV E/E' RATIO (AVERAGED): 13.17
ECHO MV E/E' SEPTAL: 17.46
ECHO PV MAX VELOCITY: 1.4 M/S
ECHO PV PEAK GRADIENT: 8 MMHG
ECHO RA AREA 4C: 10.7 CM2
ECHO RA END SYSTOLIC VOLUME APICAL 4 CHAMBER INDEX BSA: 12 ML/M2
ECHO RA VOLUME: 21 ML
ECHO RIGHT VENTRICULAR SYSTOLIC PRESSURE (RVSP): 35 MMHG
ECHO RV BASAL DIMENSION: 3 CM
ECHO RV FREE WALL PEAK S': 12.2 CM/S
ECHO RV LONGITUDINAL DIMENSION: 7.2 CM
ECHO RV MID DIMENSION: 1.9 CM
ECHO RV TAPSE: 2.1 CM (ref 1.7–?)
ECHO TV REGURGITANT MAX VELOCITY: 2.84 M/S
ECHO TV REGURGITANT PEAK GRADIENT: 32 MMHG
EOSINOPHIL # BLD: 0.4 K/UL (ref 0–0.6)
EOSINOPHIL NFR BLD: 6.2 %
GFR SERPLBLD CREATININE-BSD FMLA CKD-EPI: 42 ML/MIN/{1.73_M2}
GFR SERPLBLD CREATININE-BSD FMLA CKD-EPI: 42 ML/MIN/{1.73_M2}
GLUCOSE SERPL-MCNC: 106 MG/DL (ref 70–99)
GLUCOSE SERPL-MCNC: 106 MG/DL (ref 70–99)
HCT VFR BLD AUTO: 31.6 % (ref 36–48)
HGB BLD-MCNC: 10.5 G/DL (ref 12–16)
LYMPHOCYTES # BLD: 2.4 K/UL (ref 1–5.1)
LYMPHOCYTES NFR BLD: 37.7 %
MCH RBC QN AUTO: 31 PG (ref 26–34)
MCHC RBC AUTO-ENTMCNC: 33.3 G/DL (ref 31–36)
MCV RBC AUTO: 93.3 FL (ref 80–100)
MONOCYTES # BLD: 0.6 K/UL (ref 0–1.3)
MONOCYTES NFR BLD: 9.8 %
NEUTROPHILS # BLD: 2.9 K/UL (ref 1.7–7.7)
NEUTROPHILS NFR BLD: 45.3 %
ORGANISM: ABNORMAL
PLATELET # BLD AUTO: 222 K/UL (ref 135–450)
PMV BLD AUTO: 8.2 FL (ref 5–10.5)
POTASSIUM SERPL-SCNC: 4.7 MMOL/L (ref 3.5–5.1)
POTASSIUM SERPL-SCNC: 4.7 MMOL/L (ref 3.5–5.1)
RBC # BLD AUTO: 3.38 M/UL (ref 4–5.2)
SODIUM SERPL-SCNC: 140 MMOL/L (ref 136–145)
SODIUM SERPL-SCNC: 140 MMOL/L (ref 136–145)
WBC # BLD AUTO: 6.4 K/UL (ref 4–11)

## 2025-06-09 PROCEDURE — 93306 TTE W/DOPPLER COMPLETE: CPT | Performed by: INTERNAL MEDICINE

## 2025-06-09 PROCEDURE — 6360000002 HC RX W HCPCS: Performed by: HOSPITALIST

## 2025-06-09 PROCEDURE — 85379 FIBRIN DEGRADATION QUANT: CPT

## 2025-06-09 PROCEDURE — 6370000000 HC RX 637 (ALT 250 FOR IP): Performed by: NURSE PRACTITIONER

## 2025-06-09 PROCEDURE — 80048 BASIC METABOLIC PNL TOTAL CA: CPT

## 2025-06-09 PROCEDURE — A9540 TC99M MAA: HCPCS | Performed by: NURSE PRACTITIONER

## 2025-06-09 PROCEDURE — 3430000000 HC RX DIAGNOSTIC RADIOPHARMACEUTICAL: Performed by: NURSE PRACTITIONER

## 2025-06-09 PROCEDURE — 1200000000 HC SEMI PRIVATE

## 2025-06-09 PROCEDURE — 6360000002 HC RX W HCPCS: Performed by: INTERNAL MEDICINE

## 2025-06-09 PROCEDURE — 36415 COLL VENOUS BLD VENIPUNCTURE: CPT

## 2025-06-09 PROCEDURE — A9558 XE133 XENON 10MCI: HCPCS | Performed by: NURSE PRACTITIONER

## 2025-06-09 PROCEDURE — 2500000003 HC RX 250 WO HCPCS: Performed by: INTERNAL MEDICINE

## 2025-06-09 PROCEDURE — 99233 SBSQ HOSP IP/OBS HIGH 50: CPT | Performed by: NURSE PRACTITIONER

## 2025-06-09 PROCEDURE — 2500000003 HC RX 250 WO HCPCS: Performed by: NURSE PRACTITIONER

## 2025-06-09 PROCEDURE — 2500000003 HC RX 250 WO HCPCS: Performed by: HOSPITALIST

## 2025-06-09 PROCEDURE — 6370000000 HC RX 637 (ALT 250 FOR IP): Performed by: HOSPITALIST

## 2025-06-09 PROCEDURE — 93306 TTE W/DOPPLER COMPLETE: CPT

## 2025-06-09 PROCEDURE — 6370000000 HC RX 637 (ALT 250 FOR IP): Performed by: INTERNAL MEDICINE

## 2025-06-09 PROCEDURE — 6360000002 HC RX W HCPCS: Performed by: NURSE PRACTITIONER

## 2025-06-09 PROCEDURE — 85025 COMPLETE CBC W/AUTO DIFF WBC: CPT

## 2025-06-09 PROCEDURE — 78582 LUNG VENTILAT&PERFUS IMAGING: CPT

## 2025-06-09 RX ORDER — XENON XE-133 10 MCI/1
5.1 GAS RESPIRATORY (INHALATION)
Status: COMPLETED | OUTPATIENT
Start: 2025-06-09 | End: 2025-06-09

## 2025-06-09 RX ORDER — SODIUM CHLORIDE 0.9 % (FLUSH) 0.9 %
10 SYRINGE (ML) INJECTION PRN
Status: DISCONTINUED | OUTPATIENT
Start: 2025-06-09 | End: 2025-06-12 | Stop reason: SDUPTHER

## 2025-06-09 RX ADMIN — Medication 10 ML: at 08:23

## 2025-06-09 RX ADMIN — ISOSORBIDE MONONITRATE 15 MG: 30 TABLET, EXTENDED RELEASE ORAL at 08:22

## 2025-06-09 RX ADMIN — Medication 5.32 MILLICURIE: at 11:00

## 2025-06-09 RX ADMIN — HYDROCODONE BITARTRATE AND ACETAMINOPHEN 1 TABLET: 10; 325 TABLET ORAL at 21:27

## 2025-06-09 RX ADMIN — METHOCARBAMOL 500 MG: 500 TABLET ORAL at 13:10

## 2025-06-09 RX ADMIN — PANTOPRAZOLE SODIUM 40 MG: 40 INJECTION, POWDER, FOR SOLUTION INTRAVENOUS at 08:22

## 2025-06-09 RX ADMIN — XENON XE-133 5.1 MILLICURIE: 10 GAS RESPIRATORY (INHALATION) at 11:00

## 2025-06-09 RX ADMIN — HEPARIN SODIUM 5000 UNITS: 5000 INJECTION INTRAVENOUS; SUBCUTANEOUS at 13:10

## 2025-06-09 RX ADMIN — PANTOPRAZOLE SODIUM 40 MG: 40 INJECTION, POWDER, FOR SOLUTION INTRAVENOUS at 19:40

## 2025-06-09 RX ADMIN — METHOCARBAMOL 500 MG: 500 TABLET ORAL at 08:23

## 2025-06-09 RX ADMIN — METHOCARBAMOL 500 MG: 500 TABLET ORAL at 17:11

## 2025-06-09 RX ADMIN — HEPARIN SODIUM 5000 UNITS: 5000 INJECTION INTRAVENOUS; SUBCUTANEOUS at 04:52

## 2025-06-09 RX ADMIN — ROSUVASTATIN CALCIUM 20 MG: 20 TABLET, FILM COATED ORAL at 19:40

## 2025-06-09 RX ADMIN — VENLAFAXINE HYDROCHLORIDE 75 MG: 37.5 TABLET ORAL at 08:23

## 2025-06-09 RX ADMIN — Medication 10 ML: at 19:40

## 2025-06-09 RX ADMIN — HYDROCODONE BITARTRATE AND ACETAMINOPHEN 1 TABLET: 10; 325 TABLET ORAL at 13:10

## 2025-06-09 RX ADMIN — HYDROCODONE BITARTRATE AND ACETAMINOPHEN 1 TABLET: 10; 325 TABLET ORAL at 04:52

## 2025-06-09 RX ADMIN — DIPHENHYDRAMINE HYDROCHLORIDE 25 MG: 25 TABLET ORAL at 10:05

## 2025-06-09 RX ADMIN — VENLAFAXINE HYDROCHLORIDE 75 MG: 37.5 TABLET ORAL at 19:39

## 2025-06-09 RX ADMIN — SODIUM CHLORIDE, PRESERVATIVE FREE 10 ML: 5 INJECTION INTRAVENOUS at 11:00

## 2025-06-09 RX ADMIN — ASPIRIN 81 MG: 81 TABLET, CHEWABLE ORAL at 08:23

## 2025-06-09 RX ADMIN — WATER 1000 MG: 1 INJECTION INTRAMUSCULAR; INTRAVENOUS; SUBCUTANEOUS at 18:31

## 2025-06-09 ASSESSMENT — ENCOUNTER SYMPTOMS
CONSTIPATION: 0
FACIAL SWELLING: 0
VOMITING: 0
CHEST TIGHTNESS: 0
DIARRHEA: 0
ABDOMINAL DISTENTION: 0
ABDOMINAL PAIN: 0
PHOTOPHOBIA: 0
COUGH: 0
BLOOD IN STOOL: 0
EYE REDNESS: 0
NAUSEA: 0
SHORTNESS OF BREATH: 0
EYE DISCHARGE: 0

## 2025-06-09 ASSESSMENT — PAIN DESCRIPTION - LOCATION
LOCATION: ABDOMEN;CHEST
LOCATION: ABDOMEN
LOCATION: ABDOMEN;CHEST
LOCATION: ABDOMEN;CHEST
LOCATION: ABDOMEN

## 2025-06-09 ASSESSMENT — PAIN DESCRIPTION - ORIENTATION
ORIENTATION: RIGHT

## 2025-06-09 ASSESSMENT — PAIN SCALES - GENERAL
PAINLEVEL_OUTOF10: 9
PAINLEVEL_OUTOF10: 5
PAINLEVEL_OUTOF10: 5
PAINLEVEL_OUTOF10: 7
PAINLEVEL_OUTOF10: 7
PAINLEVEL_OUTOF10: 9

## 2025-06-09 ASSESSMENT — PAIN SCALES - WONG BAKER: WONGBAKER_NUMERICALRESPONSE: NO HURT

## 2025-06-09 ASSESSMENT — PAIN DESCRIPTION - DESCRIPTORS
DESCRIPTORS: DISCOMFORT
DESCRIPTORS: DISCOMFORT
DESCRIPTORS: ACHING
DESCRIPTORS: DISCOMFORT

## 2025-06-09 ASSESSMENT — PAIN DESCRIPTION - PAIN TYPE
TYPE: ACUTE PAIN
TYPE: ACUTE PAIN

## 2025-06-09 NOTE — PROGRESS NOTES
Pt has pop and 6 pack of sprite at bedside. Pt educated on diet orders in place saying she needed to be caffeine free. Pt told this RN that day shift RN told her she had until midnight. This RN explained to pt that was not the case and that she was not supposed to drink any caffeine. Pt agreeable to getting rid of dark pop at bedside. Sprite placed in bottom draw of side dresser. Pt denies any other needs at this time. Call light within reach.

## 2025-06-09 NOTE — FLOWSHEET NOTE
MD notified of /85. No new orders at this time   06/09/25 1141   Vital Signs   Temp 98.2 °F (36.8 °C)   Temp Source Temporal   Pulse 97   Heart Rate Source Telemetry   Respirations 16   BP (!) 179/85   MAP (Calculated) 116   MAP (mmHg) 110   BP Location Right upper arm   BP Method Automatic   Patient Position Semi fowlers     1340- /83

## 2025-06-09 NOTE — PROGRESS NOTES
[facility]    6/10/2025 3:03 PM        Name: Deidra Dumont .              Admitted: 6/6/2025  Primary Care Provider: Ilya Lomax MD (Tel: [phone])      Chief complaint: 80 yo female with hx CAD/stents. Presented from cardiology office with chest pain associated with nausea. Admitted with chest pain and CHAPO.     Subjective:  Resting in bed,  at bedside. Continues to note RUQ and chest discomfort. Denies shortness of breath, nausea. Expressing frustration that source of pain has not been found and she does not want to be DCd to only have to come back.     Reviewed interval ancillary notes    Current Medications  amLODIPine (NORVASC) tablet 5 mg, Daily  sodium chloride flush 0.9 % injection 10 mL, PRN  heparin (porcine) injection 5,000 Units, 3 times per day  isosorbide mononitrate (IMDUR) extended release tablet 15 mg, Daily  rosuvastatin (CRESTOR) tablet 20 mg, Nightly  diphenhydrAMINE (BENADRYL) tablet 25 mg, Q8H PRN  HYDROcodone-acetaminophen (NORCO)  MG per tablet 1 tablet, Q6H PRN  methocarbamol (ROBAXIN) tablet 500 mg, TID  venlafaxine (EFFEXOR) tablet 75 mg, BID  sodium chloride flush 0.9 % injection 5-40 mL, 2 times per day  sodium chloride flush 0.9 % injection 5-40 mL, PRN  0.9 % sodium chloride infusion, PRN  ondansetron (ZOFRAN-ODT) disintegrating tablet 4 mg, Q8H PRN   Or  ondansetron (ZOFRAN) injection 4 mg, Q6H PRN  acetaminophen (TYLENOL) tablet 650 mg, Q6H PRN   Or  acetaminophen (TYLENOL) suppository 650 mg, Q6H PRN  polyethylene glycol (GLYCOLAX) packet 17 g, Daily PRN  aspirin chewable tablet 81 mg, Daily  sulfur hexafluoride microspheres (LUMASON) 60.7-25 MG injection 2 mL, ONCE PRN  pantoprazole (PROTONIX) injection 40 mg, BID        Objective:  BP (!) 139/91   Pulse (!) 106   Temp 98 °F (36.7 °C) (Temporal)   Resp 16   Ht 1.549 m (5' 1\")   Wt 72.6 kg (160 lb)   Thickened and calcified. Mild regurgitation.    Mitral Valve: Mildly thickened. Mild regurgitation.    Tricuspid Valve: Mild regurgitation. RVSP is 35 mmHg.    Image quality is adequate.    MPI 6/10/2025:    Non-diagnostic study due to subdiaphragmatic artifact    Stress Function: Left ventricular function post-stress is normal. Post-stress ejection fraction is 55%. The stress end diastolic cavity size is normal. Stress end diastolic volume: 73 mL. The stress end systolic cavity size is normal. Stress end systolic volume: 33 mL. LV mass: 118.0 g.    ECG: Resting ECG demonstrates normal sinus rhythm.      Problem List  Principal Problem:    Chest pain  Active Problems:    Essential hypertension    Coronary artery disease of native artery of native heart with stable angina pectoris    Class 1 obesity    Elevated brain natriuretic peptide (BNP) level    Esophagitis    Acute kidney injury superimposed on stage 3b chronic kidney disease (HCC)    Elevated alkaline phosphatase level  Resolved Problems:    * No resolved hospital problems. *       Assessment & Plan:     Chest pain,  CAD  - Hx multiple stents with chronic angina  - Presented with episode of more intense chest pain  - Troponin 24->24->22  - ECG with no acute ST changes  - D-dimer 0.93, also was noted to be tachycardic  - VQ scan with no evidence of PE  - Echo with EF 65%, normal wall motion  - Stress test non-diagnostic due to subdiaphragmatic artifact  - Cardiology suspects symptoms secondary to GI. Will consider dobutamine stress if no further GI findings    Esophagitis,  RUQ pain  - Reported several episodes of vomiting day of admission  - CT scan abd showed esophagitis  - Was started on IV Protonix BID  - Ordered MRI abd to further assess persistent RUQ pain, unable to use contrast due to recent CHAPO  - GI evaluated, considering EGD after cardiac workup completed, will reconsult  - NPO after MN in case EGD planned    CHAPO on CKD stage III  - Creatinine 2.0

## 2025-06-09 NOTE — TELEPHONE ENCOUNTER
states pt is still in the hospital and pt was told that her pain could be coming from her abdomin, was checked for pancreatitis, had a CT, is getting echo now.  asking if an order for a MRI can be ordered, but no on one is ordering the MRI and wants to know if npts and help. Please advise .

## 2025-06-09 NOTE — PROGRESS NOTES
[facility]   Cardiology Progress Note     Date: 6/9/2025  Admit Date: 6/6/2025     Reason for consultation:     Chief Complaint   Patient presents with    Chest Pain     Advised to come to emergency room by cardiology. N/v today, chest pain today. History of prior coronary artery diease + stents.       History of Present Illness: History obtained from patient and medical record.     Deidra Dumont is a 79 y.o. female who presented to the hospital with complaints of chest pain. I have been asked to provide consultation regarding further management and testing. The patient reports that she has been having shortness of breath. She reports that she came to the hospital for exertional chest pain and vomiting. She states that it lasted approximately 2 hours. She states that it feels different from her prior cardiac symptoms. She isnt sure if it is related to a chicken salad. She denies any radiation to shoulder, jaw, arm. No associated diaphoresis.  (per consult note)    Interval Hx: Today, she is feeling ok. No further chest pain since Saturday. Generalized DELGADO. Denies orthopnea or swelling. Tele stable.     Patient seen and examined. Clinical notes reviewed. Telemetry reviewed.  No new complaints today. No major events overnight.   Denies having chest pain, palpitations, shortness of breath, orthopnea/PND, cough, or dizziness at the time of this visit.      Past Medical History:  Past Medical History:   Diagnosis Date    Arthritis     bilateral hands, bilateral knees, neck    Awareness under anesthesia     Blood transfusion     CAD (coronary artery disease)     Coronary stent 08/27/2015    RCA stents on 3 occasions 2009, 2012, 2015    CVA (cerebral vascular accident) ([facility]) 01/19/2016    pt states determined she did not have a stroke    Febrile seizure ([facility]) 1953    Gastroesophageal reflux disease     History of blood transfusion     Hyperlipidemia     Hypertension     Septic shock ([facility]) 2023    Dec       Wall motion is normal; there are no regional wall motion abnormalities.      Unable to assess diastolic function.   2. Aortic valve: The annulus is mildly calcified. There is mild stenosis.      There is mild regurgitation. The peak systolic velocity is 1.94m/sec. The      mean systolic gradient is 7mm Hg. The LVOT to aortic valve VTI ratio is      0.56. The valve area (VTI) is 1.5cm^2.   3. Mitral valve: The annulus is mildly calcified. There is mild      regurgitation. The valve area by pressure half-time is 4.9cm^2.   4. Right ventricle: The cavity size is normal. Wall thickness is normal.      Systolic function is normal.   5. Pulmonary arteries: Estimated PA peak pressure is 34mm Hg (S). Systolic      pressure is mildly increased.   6. Pericardium, extracardiac: There is no pericardial effusion.   Impressions:  The previous study was not available, so comparison is made to   the report of 11/02/2023. Previous EF 58%       LH (July '23):   99 % stenosis of the proximal OM 1 with a ruptured plaque status post PCI and DWAYNE  80% proximal RCA in-stent stenosis, long 70% lesion in proximal large diagonal.      Findings:  Coronary Angiography  Dominance: Right    LM: Patent  LAD: Patent  D1: Very large vessel, with 70% long lesion proximal  LCx: Patent  OM1: 99% focal proximal, hazy, appears to be after plaque  RCA: 80-90% in-stent restenosis proximal.  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  Long 70%  lesion in proximal

## 2025-06-09 NOTE — PROGRESS NOTES
Hospitalist Progress Note      Name:  Deidra Dumont /Age/Sex: 1945  (79 y.o. female)   MRN & CSN:  [id number] & [id number] Encounter Date/Time: 2025 8:41 AM EDT   Location:  [id number] PCP: Ilya Lomax MD     Attending:Richie Ellington MD       Hospital Day: 4    Subjective:   Chief Complaint:   Chief Complaint   Patient presents with    Chest Pain     Advised to come to emergency room by cardiology. N/v today, chest pain today. History of prior coronary artery diease + stents.     Deidra Dumont is a 79 y.o. female with a past medical history of hypertension, hyperlipidemia, CVA, CAD, arthritis, febrile seizure, CKDIIIb, GERD who presented with chest pain. Seen in cardiology office  with complaints of CP and also with hypotension and she was sent to the ED. Hx of complex CAD with multiple PCI.  CT showed possible esphagitis, started on protonix and GI consutled.  Also noted to have CHAPO, nephrology consulted.      Interval History:  Today, she is resting in bed.  Reports gradually worsening SOB x 2 months.  No further CP today.  Denies swelling.  She has been tachycardic the last couple days.no hx of blood clots.      Independently reviewed interval ancillary notes from cardiology and gastroenterology.     Assessment and Recommendations   Problem List  Principal Problem:    Chest pain  Active Problems:    Essential hypertension    Coronary artery disease of native artery of native heart with stable angina pectoris    Class 1 obesity    Elevated brain natriuretic peptide (BNP) level    Esophagitis    Acute kidney injury superimposed on stage 3b chronic kidney disease (HCC)    Elevated alkaline phosphatase level  Resolved Problems:    * No resolved hospital problems. *     Assessment and Plan:    Chest Pain  CAD   - Complains of chronic angina, however she had episode of more intense CP yesterday   - PTCA RCA , ,    - PTCA OM    - RCA intent stenosis    -

## 2025-06-09 NOTE — PROGRESS NOTES
[facility] Note    Patient Active Problem List   Diagnosis    Coronary artery disease due to lipid rich plaque    Palpitations    Chest pain    Mixed hyperlipidemia    SOB (shortness of breath)    Dizziness    Post PTCA    Hypertensive urgency    Insomnia    Encounter for electronic analysis of reveal event recorder    H/O carotid stenosis    Inappropriate sinus tachycardia    Obesity    Acute inferior myocardial infarction (HCC)    H/O percutaneous transluminal coronary angioplasty    Hypotension    Pericarditis    Essential hypertension    Recent myocardial infarction    Uncontrolled hypertension    GI bleed    Antiplatelet or antithrombotic long-term use    Tachycardia    Gastrointestinal hemorrhage    Syncope and collapse    Hx of myocardial infarction    CVA (cerebral vascular accident) (HCC)    Facial droop    Hemiparesis (HCC)    Coronary artery disease of native artery of native heart with stable angina pectoris    Stenosis of right carotid artery    Dyslipidemia    New onset seizure (HCC)    Status post insertion of drug-eluting stent into right coronary artery for coronary artery disease    Bilateral leg edema    Nonrheumatic aortic valve insufficiency    Carotid atherosclerosis    Pain due to cardiac device    Chest discomfort    Carotid stenosis, bilateral    Carotid stenosis, right    Hematoma of neck    Hematoma    Class 1 obesity    Elevated brain natriuretic peptide (BNP) level    Esophagitis    Acute kidney injury superimposed on stage 3b chronic kidney disease (HCC)    Elevated alkaline phosphatase level       Past Medical History:   has a past medical history of Arthritis, Awareness under anesthesia, Blood transfusion, CAD (coronary artery disease), Coronary stent, CVA (cerebral vascular accident) (HCC), Febrile seizure (HCC), Gastroesophageal reflux disease, History of blood transfusion, Hyperlipidemia, Hypertension, and Septic shock (HCC).    Past Social History:      Hematocrit   Date Value Ref Range Status   06/08/2025 32.2 (L) 36.0 - 48.0 % Final     WBC   Date Value Ref Range Status   06/08/2025 8.5 4.0 - 11.0 K/uL Final     Platelets   Date Value Ref Range Status   06/08/2025 224 135 - 450 K/uL Final     Lab Results   Component Value Date    CREATININE 1.8 (H) 06/08/2025    BUN 32 (H) 06/08/2025     06/08/2025    K 5.1 06/08/2025    K 5.1 06/08/2025     06/08/2025    CO2 16 (L) 06/08/2025     Urinalysis specific gravity of 1.010, pH 5, protein 30, blood negative, WBC 31    VBG 7.42/24.5    CKD 4      Assessment/Plan:  1.  CHAPO on CKD 3a- baseline creatinine 1.4-1.5.   Probably from prerenal azotemia.  History of CHAPO requiring HD with recovery.  Better.  Follow creatinine today.  2.  Low serum bicarbonate-VBG showing respiratory alkalosis.  Stop NSS for now.  No need for bicarbonate replacement  3.  Hypotension-BP currently higher.  Follow for now.  Follow echocardiogram.  Does not appear hypervolemic at this time.  proBNP could be elevated in the setting of CHAPO.  4.  Anemia-follow hemoglobin closely  5.  History of CAD-on statin.   6.  Chest pain-continue PPI.  Cardiology following.  Planning for stress test.  7.  E. coli UTI-on ceftriaxone  8.  For stress test today    Garth Casas MD

## 2025-06-10 ENCOUNTER — APPOINTMENT (OUTPATIENT)
Age: 80
DRG: 322 | End: 2025-06-10
Attending: INTERNAL MEDICINE
Payer: MEDICARE

## 2025-06-10 ENCOUNTER — APPOINTMENT (OUTPATIENT)
Dept: MRI IMAGING | Age: 80
DRG: 322 | End: 2025-06-10
Payer: MEDICARE

## 2025-06-10 LAB
ANION GAP SERPL CALCULATED.3IONS-SCNC: 15 MMOL/L (ref 3–16)
BASOPHILS # BLD: 0.1 K/UL (ref 0–0.2)
BASOPHILS NFR BLD: 0.8 %
BUN SERPL-MCNC: 18 MG/DL (ref 7–20)
CALCIUM SERPL-MCNC: 9.8 MG/DL (ref 8.3–10.6)
CHLORIDE SERPL-SCNC: 106 MMOL/L (ref 99–110)
CO2 SERPL-SCNC: 17 MMOL/L (ref 21–32)
CREAT SERPL-MCNC: 1.4 MG/DL (ref 0.6–1.2)
DEPRECATED RDW RBC AUTO: 15 % (ref 12.4–15.4)
ECHO BSA: 1.77 M2
EOSINOPHIL # BLD: 0.5 K/UL (ref 0–0.6)
EOSINOPHIL NFR BLD: 7.5 %
GFR SERPLBLD CREATININE-BSD FMLA CKD-EPI: 38 ML/MIN/{1.73_M2}
GLUCOSE SERPL-MCNC: 111 MG/DL (ref 70–99)
HCT VFR BLD AUTO: 35.7 % (ref 36–48)
HGB BLD-MCNC: 11.6 G/DL (ref 12–16)
LYMPHOCYTES # BLD: 2.6 K/UL (ref 1–5.1)
LYMPHOCYTES NFR BLD: 38.4 %
MCH RBC QN AUTO: 31.2 PG (ref 26–34)
MCHC RBC AUTO-ENTMCNC: 32.5 G/DL (ref 31–36)
MCV RBC AUTO: 96 FL (ref 80–100)
MONOCYTES # BLD: 0.6 K/UL (ref 0–1.3)
MONOCYTES NFR BLD: 9.2 %
NEUTROPHILS # BLD: 3 K/UL (ref 1.7–7.7)
NEUTROPHILS NFR BLD: 44.1 %
NUC STRESS EJECTION FRACTION: 55 %
NUC STRESS LV EDV: 73 ML (ref 56–104)
NUC STRESS LV ESV: 33 ML (ref 19–49)
NUC STRESS LV MASS: 118 G
PLATELET # BLD AUTO: 247 K/UL (ref 135–450)
PMV BLD AUTO: 8.1 FL (ref 5–10.5)
POTASSIUM SERPL-SCNC: 5.1 MMOL/L (ref 3.5–5.1)
RBC # BLD AUTO: 3.72 M/UL (ref 4–5.2)
SODIUM SERPL-SCNC: 138 MMOL/L (ref 136–145)
STRESS BASELINE DIAS BP: 91 MMHG
STRESS BASELINE HR: 115 BPM
STRESS BASELINE SYS BP: 156 MMHG
STRESS ESTIMATED WORKLOAD: 1 METS
STRESS EXERCISE DUR MIN: 4 MIN
STRESS EXERCISE DUR SEC: 0 SEC
STRESS O2 SAT PEAK: 93 %
STRESS O2 SAT REST: 90 %
STRESS PEAK DIAS BP: 91 MMHG
STRESS PEAK SYS BP: 156 MMHG
STRESS PERCENT HR ACHIEVED: 89 %
STRESS POST PEAK HR: 126 BPM
STRESS RATE PRESSURE PRODUCT: NORMAL BPM*MMHG
STRESS TARGET HR: 142 BPM
TID: 0.88
TROPONIN, HIGH SENSITIVITY: 22 NG/L (ref 0–14)
WBC # BLD AUTO: 6.9 K/UL (ref 4–11)

## 2025-06-10 PROCEDURE — 1200000000 HC SEMI PRIVATE

## 2025-06-10 PROCEDURE — 6360000002 HC RX W HCPCS: Performed by: HOSPITALIST

## 2025-06-10 PROCEDURE — 93016 CV STRESS TEST SUPVJ ONLY: CPT | Performed by: INTERNAL MEDICINE

## 2025-06-10 PROCEDURE — 6370000000 HC RX 637 (ALT 250 FOR IP): Performed by: HOSPITALIST

## 2025-06-10 PROCEDURE — 6370000000 HC RX 637 (ALT 250 FOR IP): Performed by: NURSE PRACTITIONER

## 2025-06-10 PROCEDURE — 74181 MRI ABDOMEN W/O CONTRAST: CPT

## 2025-06-10 PROCEDURE — 6370000000 HC RX 637 (ALT 250 FOR IP): Performed by: INTERNAL MEDICINE

## 2025-06-10 PROCEDURE — A9502 TC99M TETROFOSMIN: HCPCS | Performed by: INTERNAL MEDICINE

## 2025-06-10 PROCEDURE — 6360000002 HC RX W HCPCS: Performed by: NURSE PRACTITIONER

## 2025-06-10 PROCEDURE — 93018 CV STRESS TEST I&R ONLY: CPT | Performed by: INTERNAL MEDICINE

## 2025-06-10 PROCEDURE — 6360000002 HC RX W HCPCS: Performed by: INTERNAL MEDICINE

## 2025-06-10 PROCEDURE — 78452 HT MUSCLE IMAGE SPECT MULT: CPT | Performed by: INTERNAL MEDICINE

## 2025-06-10 PROCEDURE — 85025 COMPLETE CBC W/AUTO DIFF WBC: CPT

## 2025-06-10 PROCEDURE — 99233 SBSQ HOSP IP/OBS HIGH 50: CPT | Performed by: NURSE PRACTITIONER

## 2025-06-10 PROCEDURE — 84484 ASSAY OF TROPONIN QUANT: CPT

## 2025-06-10 PROCEDURE — 80048 BASIC METABOLIC PNL TOTAL CA: CPT

## 2025-06-10 PROCEDURE — 3430000000 HC RX DIAGNOSTIC RADIOPHARMACEUTICAL: Performed by: INTERNAL MEDICINE

## 2025-06-10 PROCEDURE — 2500000003 HC RX 250 WO HCPCS: Performed by: HOSPITALIST

## 2025-06-10 PROCEDURE — 93017 CV STRESS TEST TRACING ONLY: CPT

## 2025-06-10 PROCEDURE — 78452 HT MUSCLE IMAGE SPECT MULT: CPT

## 2025-06-10 PROCEDURE — 36415 COLL VENOUS BLD VENIPUNCTURE: CPT

## 2025-06-10 PROCEDURE — 94760 N-INVAS EAR/PLS OXIMETRY 1: CPT

## 2025-06-10 RX ORDER — HYDROMORPHONE HYDROCHLORIDE 1 MG/ML
0.5 INJECTION, SOLUTION INTRAMUSCULAR; INTRAVENOUS; SUBCUTANEOUS
Status: COMPLETED | OUTPATIENT
Start: 2025-06-10 | End: 2025-06-10

## 2025-06-10 RX ORDER — AMINOPHYLLINE 25 MG/ML
100 INJECTION, SOLUTION INTRAVENOUS ONCE
Status: COMPLETED | OUTPATIENT
Start: 2025-06-10 | End: 2025-06-10

## 2025-06-10 RX ORDER — HYDROXYZINE HYDROCHLORIDE 10 MG/1
10 TABLET, FILM COATED ORAL
Status: DISCONTINUED | OUTPATIENT
Start: 2025-06-10 | End: 2025-06-13 | Stop reason: HOSPADM

## 2025-06-10 RX ORDER — REGADENOSON 0.08 MG/ML
0.4 INJECTION, SOLUTION INTRAVENOUS
Status: COMPLETED | OUTPATIENT
Start: 2025-06-10 | End: 2025-06-10

## 2025-06-10 RX ORDER — AMLODIPINE BESYLATE 5 MG/1
5 TABLET ORAL DAILY
Status: DISCONTINUED | OUTPATIENT
Start: 2025-06-10 | End: 2025-06-13 | Stop reason: HOSPADM

## 2025-06-10 RX ADMIN — REGADENOSON 0.4 MG: 0.08 INJECTION, SOLUTION INTRAVENOUS at 11:49

## 2025-06-10 RX ADMIN — TETROFOSMIN 11.2 MILLICURIE: 1.38 INJECTION, POWDER, LYOPHILIZED, FOR SOLUTION INTRAVENOUS at 11:01

## 2025-06-10 RX ADMIN — Medication 10 ML: at 09:07

## 2025-06-10 RX ADMIN — VENLAFAXINE HYDROCHLORIDE 75 MG: 37.5 TABLET ORAL at 09:06

## 2025-06-10 RX ADMIN — ASPIRIN 81 MG: 81 TABLET, CHEWABLE ORAL at 09:06

## 2025-06-10 RX ADMIN — DIPHENHYDRAMINE HYDROCHLORIDE 25 MG: 25 TABLET ORAL at 20:02

## 2025-06-10 RX ADMIN — HYDROXYZINE HYDROCHLORIDE 10 MG: 10 TABLET ORAL at 21:12

## 2025-06-10 RX ADMIN — VENLAFAXINE HYDROCHLORIDE 75 MG: 37.5 TABLET ORAL at 20:01

## 2025-06-10 RX ADMIN — PANTOPRAZOLE SODIUM 40 MG: 40 INJECTION, POWDER, FOR SOLUTION INTRAVENOUS at 20:02

## 2025-06-10 RX ADMIN — Medication 10 ML: at 20:02

## 2025-06-10 RX ADMIN — AMLODIPINE BESYLATE 5 MG: 5 TABLET ORAL at 13:34

## 2025-06-10 RX ADMIN — TETROFOSMIN 33 MILLICURIE: 1.38 INJECTION, POWDER, LYOPHILIZED, FOR SOLUTION INTRAVENOUS at 11:50

## 2025-06-10 RX ADMIN — HYDROMORPHONE HYDROCHLORIDE 0.5 MG: 1 INJECTION, SOLUTION INTRAMUSCULAR; INTRAVENOUS; SUBCUTANEOUS at 14:13

## 2025-06-10 RX ADMIN — AMINOPHYLLINE 100 MG: 25 INJECTION, SOLUTION INTRAVENOUS at 11:54

## 2025-06-10 RX ADMIN — METHOCARBAMOL 500 MG: 500 TABLET ORAL at 09:06

## 2025-06-10 RX ADMIN — ISOSORBIDE MONONITRATE 15 MG: 30 TABLET, EXTENDED RELEASE ORAL at 09:06

## 2025-06-10 RX ADMIN — HYDROMORPHONE HYDROCHLORIDE 0.5 MG: 1 INJECTION, SOLUTION INTRAMUSCULAR; INTRAVENOUS; SUBCUTANEOUS at 01:17

## 2025-06-10 RX ADMIN — METHOCARBAMOL 500 MG: 500 TABLET ORAL at 17:19

## 2025-06-10 RX ADMIN — ROSUVASTATIN CALCIUM 20 MG: 20 TABLET, FILM COATED ORAL at 20:01

## 2025-06-10 RX ADMIN — METHOCARBAMOL 500 MG: 500 TABLET ORAL at 13:34

## 2025-06-10 RX ADMIN — PANTOPRAZOLE SODIUM 40 MG: 40 INJECTION, POWDER, FOR SOLUTION INTRAVENOUS at 09:06

## 2025-06-10 RX ADMIN — HYDROCODONE BITARTRATE AND ACETAMINOPHEN 1 TABLET: 10; 325 TABLET ORAL at 20:02

## 2025-06-10 ASSESSMENT — PAIN SCALES - GENERAL
PAINLEVEL_OUTOF10: 7
PAINLEVEL_OUTOF10: 8
PAINLEVEL_OUTOF10: 6
PAINLEVEL_OUTOF10: 6
PAINLEVEL_OUTOF10: 0
PAINLEVEL_OUTOF10: 4

## 2025-06-10 ASSESSMENT — ENCOUNTER SYMPTOMS
ABDOMINAL PAIN: 0
CHEST TIGHTNESS: 0
EYE REDNESS: 0
NAUSEA: 0
DIARRHEA: 0
COUGH: 0
VOMITING: 0
CONSTIPATION: 0
SHORTNESS OF BREATH: 0
EYE DISCHARGE: 0
FACIAL SWELLING: 0
BLOOD IN STOOL: 0
PHOTOPHOBIA: 0
ABDOMINAL DISTENTION: 0

## 2025-06-10 ASSESSMENT — PAIN DESCRIPTION - DESCRIPTORS
DESCRIPTORS: PATIENT UNABLE TO DESCRIBE
DESCRIPTORS: PATIENT UNABLE TO DESCRIBE
DESCRIPTORS: DISCOMFORT

## 2025-06-10 ASSESSMENT — PAIN DESCRIPTION - LOCATION
LOCATION: CHEST;ABDOMEN
LOCATION: CHEST
LOCATION: ABDOMEN;CHEST
LOCATION: CHEST;ABDOMEN

## 2025-06-10 ASSESSMENT — PAIN DESCRIPTION - PAIN TYPE
TYPE: ACUTE PAIN
TYPE: ACUTE PAIN

## 2025-06-10 ASSESSMENT — PAIN DESCRIPTION - ORIENTATION
ORIENTATION: MID;LEFT
ORIENTATION: RIGHT;LEFT

## 2025-06-10 NOTE — PROGRESS NOTES
[facility]   Cardiology Progress Note     Date: 6/10/2025  Admit Date: 6/6/2025     Reason for consultation:     Chief Complaint   Patient presents with    Chest Pain     Advised to come to emergency room by cardiology. N/v today, chest pain today. History of prior coronary artery diease + stents.       History of Present Illness: History obtained from patient and medical record.     Deidra Dumont is a 79 y.o. female who presented to the hospital with complaints of chest pain. I have been asked to provide consultation regarding further management and testing. The patient reports that she has been having shortness of breath. She reports that she came to the hospital for exertional chest pain and vomiting. She states that it lasted approximately 2 hours. She states that it feels different from her prior cardiac symptoms. She isnt sure if it is related to a chicken salad. She denies any radiation to shoulder, jaw, arm. No associated diaphoresis.  (per consult note)    Interval Hx: Today, she has persistent atypical chest discomfort. Current discomfort associated with RUQ pain. Tele stable.     Patient seen and examined. Clinical notes reviewed. Telemetry reviewed.  No new complaints today. No major events overnight.   Denies having chest pain, palpitations, shortness of breath, orthopnea/PND, cough, or dizziness at the time of this visit.      Past Medical History:  Past Medical History:   Diagnosis Date    Arthritis     bilateral hands, bilateral knees, neck    Awareness under anesthesia     Blood transfusion     CAD (coronary artery disease)     Coronary stent 08/27/2015    RCA stents on 3 occasions 2009, 2012, 2015    CVA (cerebral vascular accident) (HCC) 01/19/2016    pt states determined she did not have a stroke    Febrile seizure ([facility]) 1953    Gastroesophageal reflux disease     History of blood transfusion     Hyperlipidemia     Hypertension     Septic shock ([facility]) 2023    Dec 2023 to March

## 2025-06-10 NOTE — PLAN OF CARE
Problem: Chronic Conditions and Co-morbidities  Goal: Patient's chronic conditions and co-morbidity symptoms are monitored and maintained or improved  6/9/2025 2147 by Zuri Holder RN  Outcome: Progressing     Problem: Discharge Planning  Goal: Discharge to home or other facility with appropriate resources  6/9/2025 2147 by Zuri Holder RN  Outcome: Progressing     Problem: Pain  Goal: Verbalizes/displays adequate comfort level or baseline comfort level  6/9/2025 2147 by Zuri Holder RN  Outcome: Progressing     Problem: ABCDS Injury Assessment  Goal: Absence of physical injury  6/9/2025 2147 by Zuri Holder RN  Outcome: Progressing

## 2025-06-10 NOTE — PLAN OF CARE
Problem: Chronic Conditions and Co-morbidities  Goal: Patient's chronic conditions and co-morbidity symptoms are monitored and maintained or improved  6/10/2025 0919 by Michael Stark RN  Outcome: Progressing  6/9/2025 2147 by Zuri Holder RN  Outcome: Progressing     Problem: Discharge Planning  Goal: Discharge to home or other facility with appropriate resources  6/10/2025 0919 by Michael Stark RN  Outcome: Progressing  6/9/2025 2147 by Zuri Holder RN  Outcome: Progressing     Problem: Pain  Goal: Verbalizes/displays adequate comfort level or baseline comfort level  6/10/2025 0919 by Michael Stark RN  Outcome: Progressing  6/9/2025 2147 by Zuri Holder RN  Outcome: Progressing     Problem: ABCDS Injury Assessment  Goal: Absence of physical injury  6/10/2025 0919 by Michael Stark RN  Outcome: Progressing  6/9/2025 2147 by Zuri Holder RN  Outcome: Progressing     Problem: Safety - Adult  Goal: Free from fall injury  Outcome: Progressing

## 2025-06-10 NOTE — PROGRESS NOTES
Speech Language Pathology  Attempt/Hold    Deidra Dumont  1945    SLP attempted to see pt this date for dysphagia intervention. Chart reviewed, spoke with RN who reported pt NPO for procedure this date. Will hold and attempt again as pt is available and appropriate.    Lara Osei MA CCC-SLP  Speech-Language Pathologist  SP. [id number]

## 2025-06-10 NOTE — PROGRESS NOTES
[facility] Note    Patient Active Problem List   Diagnosis    Coronary artery disease due to lipid rich plaque    Palpitations    Chest pain    Mixed hyperlipidemia    SOB (shortness of breath)    Dizziness    Post PTCA    Hypertensive urgency    Insomnia    Encounter for electronic analysis of reveal event recorder    H/O carotid stenosis    Inappropriate sinus tachycardia    Obesity    Acute inferior myocardial infarction (HCC)    H/O percutaneous transluminal coronary angioplasty    Hypotension    Pericarditis    Essential hypertension    Recent myocardial infarction    Uncontrolled hypertension    GI bleed    Antiplatelet or antithrombotic long-term use    Tachycardia    Gastrointestinal hemorrhage    Syncope and collapse    Hx of myocardial infarction    CVA (cerebral vascular accident) (HCC)    Facial droop    Hemiparesis (HCC)    Coronary artery disease of native artery of native heart with stable angina pectoris    Stenosis of right carotid artery    Dyslipidemia    New onset seizure (HCC)    Status post insertion of drug-eluting stent into right coronary artery for coronary artery disease    Bilateral leg edema    Nonrheumatic aortic valve insufficiency    Carotid atherosclerosis    Pain due to cardiac device    Chest discomfort    Carotid stenosis, bilateral    Carotid stenosis, right    Hematoma of neck    Hematoma    Class 1 obesity    Elevated brain natriuretic peptide (BNP) level    Esophagitis    Acute kidney injury superimposed on stage 3b chronic kidney disease (HCC)    Elevated alkaline phosphatase level       Past Medical History:   has a past medical history of Arthritis, Awareness under anesthesia, Blood transfusion, CAD (coronary artery disease), Cardiomyopathy (HCC), Coronary stent, CVA (cerebral vascular accident) (HCC), Febrile seizure (HCC), Gastroesophageal reflux disease, History of blood transfusion, Hyperlipidemia, Hypertension, and Septic shock  16.0 g/dL Final     Hematocrit   Date Value Ref Range Status   06/10/2025 35.7 (L) 36.0 - 48.0 % Final     WBC   Date Value Ref Range Status   06/10/2025 6.9 4.0 - 11.0 K/uL Final     Platelets   Date Value Ref Range Status   06/10/2025 247 135 - 450 K/uL Final     Lab Results   Component Value Date    CREATININE 1.4 (H) 06/10/2025    BUN 18 06/10/2025     06/10/2025    K 5.1 06/10/2025     06/10/2025    CO2 17 (L) 06/10/2025     Urinalysis specific gravity of 1.010, pH 5, protein 30, blood negative, WBC 31    VBG 7.42/24.5    EF 65%      Assessment/Plan:  1.  CHAPO on CKD 3a- baseline creatinine 1.4-1.5.   Probably from prerenal azotemia.  History of CHAPO requiring HD with recovery.  Stable today  2.  Low serum bicarbonate-VBG showing respiratory alkalosis.  No need for bicarbonate replacement  3.  Hypotension-BP currently higher.  Does not appear hypervolemic at this time.  Start amlodipine 5 mg daily.  4.  Anemia-follow hemoglobin closely  5.  History of CAD-on statin.   6.  Chest pain-continue PPI.  Cardiology following.  Planning for stress test.  7.  E. coli UTI-s/p ceftriaxone      Garth Casas MD

## 2025-06-10 NOTE — CARE COORDINATION
Discharge Planning Note:  Chart reviewed and it appears that patient has minimal needs for discharge at this time.     Risk Score 12 %     Primary Care Physician is Ilya Lomax MD    Primary insurance is [facility]    Please notify case management if any discharge needs are identified.      Case management will continue to follow progress and update discharge plan as needed.

## 2025-06-10 NOTE — PROGRESS NOTES
Spiritual Health History and Assessment/Progress Note  [facility]    (P) Spiritual/Emotional Needs,  ,  ,      Name: Deidra Dumont MRN: [id number]    Age: 79 y.o.     Sex: female   Language: English   Samaritan: Jehovah's witness   Chest pain     Date: 6/10/2025            Total Time Calculated: (P) 20 min              Spiritual Assessment began in [facility]        Referral/Consult From: (P) Rounding   Encounter Overview/Reason: (P) Spiritual/Emotional Needs  Service Provided For: (P) Patient and family together (spouse at bedside)    Sara, Belief, Meaning:   Patient identifies as spiritual, is connected with a sara tradition or spiritual practice, and has beliefs or practices that help with coping during difficult times  Family/Friends identify as spiritual, are connected with a sara tradition or spiritual practice, and have beliefs or practices that help with coping during difficult times      Importance and Influence:  Patient has spiritual/personal beliefs that influence decisions regarding their health  Family/Friends have spiritual/personal beliefs that influence decisions regarding the patient's health    Community:  Patient is connected with a spiritual community and feels well-supported. Support system includes: Spouse/Partner, Children, and Extended family  Family/Friends are connected with a spiritual community: and feel well-supported. Support system includes: Spouse/Partner, Children, and Extended family    Assessment and Plan of Care:     Patient Interventions include: Facilitated expression of thoughts and feelings, Explored spiritual coping/struggle/distress, and Affirmed coping skills/support systems  Family/Friends Interventions include: Facilitated expression of thoughts and feelings, Explored spiritual coping/struggle/distress, and Affirmed coping skills/support systems    Patient Plan of Care: No spiritual needs identified for follow-up and Spiritual Care available upon  further referral  Family/Friends Plan of Care: No spiritual needs identified for follow-up and Spiritual Care available upon further referral    Electronically signed by Chaplain John on 6/10/2025 at 2:43 PM

## 2025-06-11 LAB
ALBUMIN SERPL-MCNC: 3.6 G/DL (ref 3.4–5)
ALBUMIN/GLOB SERPL: 1.2 {RATIO} (ref 1.1–2.2)
ALP SERPL-CCNC: 194 U/L (ref 40–129)
ALT SERPL-CCNC: 24 U/L (ref 10–40)
ANION GAP SERPL CALCULATED.3IONS-SCNC: 11 MMOL/L (ref 3–16)
AST SERPL-CCNC: 26 U/L (ref 15–37)
BILIRUB SERPL-MCNC: <0.2 MG/DL (ref 0–1)
BUN SERPL-MCNC: 17 MG/DL (ref 7–20)
CALCIUM SERPL-MCNC: 9.5 MG/DL (ref 8.3–10.6)
CHLORIDE SERPL-SCNC: 104 MMOL/L (ref 99–110)
CO2 SERPL-SCNC: 21 MMOL/L (ref 21–32)
CREAT SERPL-MCNC: 1.5 MG/DL (ref 0.6–1.2)
GFR SERPLBLD CREATININE-BSD FMLA CKD-EPI: 35 ML/MIN/{1.73_M2}
GLUCOSE SERPL-MCNC: 108 MG/DL (ref 70–99)
LIPASE SERPL-CCNC: 26 U/L (ref 13–60)
POTASSIUM SERPL-SCNC: 4.5 MMOL/L (ref 3.5–5.1)
PROT SERPL-MCNC: 6.5 G/DL (ref 6.4–8.2)
SODIUM SERPL-SCNC: 136 MMOL/L (ref 136–145)

## 2025-06-11 PROCEDURE — 2580000003 HC RX 258: Performed by: INTERNAL MEDICINE

## 2025-06-11 PROCEDURE — 6370000000 HC RX 637 (ALT 250 FOR IP): Performed by: HOSPITALIST

## 2025-06-11 PROCEDURE — 6360000002 HC RX W HCPCS: Performed by: NURSE PRACTITIONER

## 2025-06-11 PROCEDURE — 36415 COLL VENOUS BLD VENIPUNCTURE: CPT

## 2025-06-11 PROCEDURE — 1200000000 HC SEMI PRIVATE

## 2025-06-11 PROCEDURE — 2500000003 HC RX 250 WO HCPCS: Performed by: HOSPITALIST

## 2025-06-11 PROCEDURE — 6370000000 HC RX 637 (ALT 250 FOR IP): Performed by: INTERNAL MEDICINE

## 2025-06-11 PROCEDURE — 80053 COMPREHEN METABOLIC PANEL: CPT

## 2025-06-11 PROCEDURE — 99291 CRITICAL CARE FIRST HOUR: CPT | Performed by: INTERNAL MEDICINE

## 2025-06-11 PROCEDURE — 6360000002 HC RX W HCPCS: Performed by: HOSPITALIST

## 2025-06-11 PROCEDURE — 6370000000 HC RX 637 (ALT 250 FOR IP): Performed by: NURSE PRACTITIONER

## 2025-06-11 PROCEDURE — 83690 ASSAY OF LIPASE: CPT

## 2025-06-11 RX ORDER — BETAXOLOL 10 MG/1
10 TABLET, FILM COATED ORAL 2 TIMES DAILY
Status: DISCONTINUED | OUTPATIENT
Start: 2025-06-11 | End: 2025-06-11

## 2025-06-11 RX ORDER — ASCORBIC ACID 500 MG
500 TABLET ORAL DAILY
Status: COMPLETED | OUTPATIENT
Start: 2025-06-11 | End: 2025-06-13

## 2025-06-11 RX ORDER — METOPROLOL TARTRATE 50 MG
100 TABLET ORAL 2 TIMES DAILY
Status: DISCONTINUED | OUTPATIENT
Start: 2025-06-11 | End: 2025-06-13 | Stop reason: HOSPADM

## 2025-06-11 RX ORDER — SODIUM CHLORIDE 9 MG/ML
INJECTION, SOLUTION INTRAVENOUS CONTINUOUS
Status: DISCONTINUED | OUTPATIENT
Start: 2025-06-11 | End: 2025-06-12

## 2025-06-11 RX ADMIN — PANTOPRAZOLE SODIUM 40 MG: 40 INJECTION, POWDER, FOR SOLUTION INTRAVENOUS at 09:17

## 2025-06-11 RX ADMIN — DIPHENHYDRAMINE HYDROCHLORIDE 25 MG: 25 TABLET ORAL at 04:17

## 2025-06-11 RX ADMIN — ISOSORBIDE MONONITRATE 15 MG: 30 TABLET, EXTENDED RELEASE ORAL at 09:18

## 2025-06-11 RX ADMIN — HEPARIN SODIUM 5000 UNITS: 5000 INJECTION INTRAVENOUS; SUBCUTANEOUS at 21:37

## 2025-06-11 RX ADMIN — DIPHENHYDRAMINE HYDROCHLORIDE 25 MG: 25 TABLET ORAL at 21:36

## 2025-06-11 RX ADMIN — HEPARIN SODIUM 5000 UNITS: 5000 INJECTION INTRAVENOUS; SUBCUTANEOUS at 16:12

## 2025-06-11 RX ADMIN — METHOCARBAMOL 500 MG: 500 TABLET ORAL at 23:37

## 2025-06-11 RX ADMIN — AMLODIPINE BESYLATE 5 MG: 5 TABLET ORAL at 09:17

## 2025-06-11 RX ADMIN — HYDROCODONE BITARTRATE AND ACETAMINOPHEN 1 TABLET: 10; 325 TABLET ORAL at 02:04

## 2025-06-11 RX ADMIN — HEPARIN SODIUM 5000 UNITS: 5000 INJECTION INTRAVENOUS; SUBCUTANEOUS at 05:30

## 2025-06-11 RX ADMIN — METHOCARBAMOL 500 MG: 500 TABLET ORAL at 09:17

## 2025-06-11 RX ADMIN — ASPIRIN 81 MG: 81 TABLET, CHEWABLE ORAL at 09:17

## 2025-06-11 RX ADMIN — ONDANSETRON 4 MG: 2 INJECTION, SOLUTION INTRAMUSCULAR; INTRAVENOUS at 23:37

## 2025-06-11 RX ADMIN — Medication 10 ML: at 09:18

## 2025-06-11 RX ADMIN — SODIUM CHLORIDE: 0.9 INJECTION, SOLUTION INTRAVENOUS at 17:04

## 2025-06-11 RX ADMIN — HYDROCODONE BITARTRATE AND ACETAMINOPHEN 1 TABLET: 10; 325 TABLET ORAL at 17:13

## 2025-06-11 RX ADMIN — METOPROLOL TARTRATE 50 MG: 50 TABLET, FILM COATED ORAL at 12:01

## 2025-06-11 RX ADMIN — PANTOPRAZOLE SODIUM 40 MG: 40 INJECTION, POWDER, FOR SOLUTION INTRAVENOUS at 21:36

## 2025-06-11 RX ADMIN — METOPROLOL TARTRATE 100 MG: 50 TABLET, FILM COATED ORAL at 21:36

## 2025-06-11 RX ADMIN — METHOCARBAMOL 500 MG: 500 TABLET ORAL at 17:14

## 2025-06-11 RX ADMIN — VENLAFAXINE HYDROCHLORIDE 75 MG: 37.5 TABLET ORAL at 09:17

## 2025-06-11 RX ADMIN — ROSUVASTATIN CALCIUM 20 MG: 20 TABLET, FILM COATED ORAL at 21:36

## 2025-06-11 RX ADMIN — VENLAFAXINE HYDROCHLORIDE 75 MG: 37.5 TABLET ORAL at 21:36

## 2025-06-11 RX ADMIN — OXYCODONE HYDROCHLORIDE AND ACETAMINOPHEN 500 MG: 500 TABLET ORAL at 16:12

## 2025-06-11 RX ADMIN — Medication 10 ML: at 21:37

## 2025-06-11 ASSESSMENT — PAIN DESCRIPTION - DESCRIPTORS
DESCRIPTORS: DISCOMFORT
DESCRIPTORS: ACHING
DESCRIPTORS: DISCOMFORT

## 2025-06-11 ASSESSMENT — ENCOUNTER SYMPTOMS
VOMITING: 0
EYE REDNESS: 0
PHOTOPHOBIA: 0
EYE DISCHARGE: 0
CONSTIPATION: 0
SHORTNESS OF BREATH: 0
ABDOMINAL PAIN: 0
ABDOMINAL DISTENTION: 0
FACIAL SWELLING: 0
CHEST TIGHTNESS: 0
NAUSEA: 0
DIARRHEA: 0
COUGH: 0
BLOOD IN STOOL: 0

## 2025-06-11 ASSESSMENT — PAIN SCALES - GENERAL
PAINLEVEL_OUTOF10: 5
PAINLEVEL_OUTOF10: 0
PAINLEVEL_OUTOF10: 4
PAINLEVEL_OUTOF10: 0
PAINLEVEL_OUTOF10: 7
PAINLEVEL_OUTOF10: 0
PAINLEVEL_OUTOF10: 7
PAINLEVEL_OUTOF10: 0

## 2025-06-11 ASSESSMENT — PAIN DESCRIPTION - LOCATION
LOCATION: ABDOMEN
LOCATION: ABDOMEN
LOCATION: CHEST;ABDOMEN
LOCATION: BACK;HEAD

## 2025-06-11 ASSESSMENT — PAIN DESCRIPTION - ORIENTATION
ORIENTATION: RIGHT;LEFT
ORIENTATION: RIGHT;UPPER;MID

## 2025-06-11 NOTE — PROGRESS NOTES
[facility]    6/11/2025 12:52 PM        Name: Deidra Dumont .              Admitted: 6/6/2025  Primary Care Provider: Ilya Lomax MD (Tel: [phone])      Chief complaint: 80 yo female with hx CAD/stents. Presented from cardiology office with chest pain associated with nausea. Admitted with chest pain and CHAPO.     Subjective:  Sitting on side of bed eating lunch. States she is feeling better today. Continues to report RUQ discomfort, notes intermittent left sided chest pain and LHC planned for tomorrow. Tolerating diet, denies nausea, constipation.    Reviewed interval ancillary notes    Current Medications  metoprolol tartrate (LOPRESSOR) tablet 100 mg, BID  0.9 % sodium chloride infusion, Continuous  ascorbic acid (VITAMIN C) tablet 500 mg, Daily  amLODIPine (NORVASC) tablet 5 mg, Daily  hydrOXYzine HCl (ATARAX) tablet 10 mg, Once PRN  sodium chloride flush 0.9 % injection 10 mL, PRN  heparin (porcine) injection 5,000 Units, 3 times per day  isosorbide mononitrate (IMDUR) extended release tablet 15 mg, Daily  rosuvastatin (CRESTOR) tablet 20 mg, Nightly  diphenhydrAMINE (BENADRYL) tablet 25 mg, Q8H PRN  HYDROcodone-acetaminophen (NORCO)  MG per tablet 1 tablet, Q6H PRN  methocarbamol (ROBAXIN) tablet 500 mg, TID  venlafaxine (EFFEXOR) tablet 75 mg, BID  sodium chloride flush 0.9 % injection 5-40 mL, 2 times per day  sodium chloride flush 0.9 % injection 5-40 mL, PRN  0.9 % sodium chloride infusion, PRN  ondansetron (ZOFRAN-ODT) disintegrating tablet 4 mg, Q8H PRN   Or  ondansetron (ZOFRAN) injection 4 mg, Q6H PRN  acetaminophen (TYLENOL) tablet 650 mg, Q6H PRN   Or  acetaminophen (TYLENOL) suppository 650 mg, Q6H PRN  polyethylene glycol (GLYCOLAX) packet 17 g, Daily PRN  aspirin chewable tablet 81 mg, Daily  sulfur hexafluoride microspheres (LUMASON) 60.7-25 MG injection 2 mL, ONCE

## 2025-06-11 NOTE — PROGRESS NOTES
CARDIOLOGY PROGRESS NOTE      Patient Name: Deidra Dumont  Date of admission: 6/6/2025  4:32 PM  Admission Dx: Chest pain [R07.9]  Difficulty breathing [R06.89]  Upper abdominal pain [R10.10]  Chest pain, unspecified type [R07.9]  Nausea and vomiting, unspecified vomiting type [R11.2]  Reason for Consult: Chest pain, shortness of breath, elevated troponin  Requesting Physician: Richie Ellington MD  Primary Care physician: Ilya Lomax MD    Subjective:     Deidra Dumont is a 79 y.o. patient with pmh of CAD s/p PCI with stent placement, carotid endarterectomy, cardiac ablation, CKD.  Patient presented to the hospital with multiple complaints including chest discomfort, shortness of breath and abdominal discomfort.  Patient is known to me from prior cardiology care.    Interval history: Patient had undergone 2D echo with Doppler, this admission, which was unremarkable.  She also underwent stress testing which appears nondiagnostic due to diaphragmatic attenuation.  She does have a history of a hiatal hernia as well.  Her biggest complaint continues to be right upper quadrant discomfort.  On presentation she did have some left-sided intermittent chest discomfort as well.      Home Medications:  Were reviewed and are listed in nursing record and/or below  Prior to Admission medications    Medication Sig Start Date End Date Taking? Authorizing Provider   clopidogrel (PLAVIX) 75 MG tablet Take 1 tablet by mouth daily   Yes Geraldine Peraza MD   vitamin B-6 (PYRIDOXINE) 50 MG tablet Take 1 tablet by mouth daily   Yes Geraldine Peraza MD   ferrous sulfate (FE TABS 325) 325 (65 Fe) MG EC tablet Take 1 tablet by mouth in the morning and at bedtime 2/15/25  Yes Eliazar Vaughn MD   Omega-3 Fatty Acids (FISH OIL PO) Take by mouth daily   Yes ProviderGeraldine MD   betaxolol (KERLONE) 10 MG tablet Take 1 tablet by mouth 2 times daily 1/15/25  Yes Letty Woo, MARIELENA - CNP

## 2025-06-11 NOTE — CONSULTS
Gastrointestinal Consult Note    Patient: Deidra Dumont CSN: [id number]     YOB: 1945  Age: 79 y.o.  Sex: female    Unit: [address] MED/SURG Room/Bed: P4F-4332/5901-01 Location: [facility]     Admitting Physician: TRUNG KELLEY    Date of  Admission: 6/6/2025   Admission type: Emergency  Primary Care Physician: Ilya Lomax MD          Referring Physician: [unfilled]    Chief Complaint: Chest pain  Consult Date: 6/10/2025     Subjective:     History of Present Illness: Deidra Dumont is a 79 y.o. female who is seen at the request of TRUNG KELLEY for chest pain    This very pleasant 79-year-old female who has been admitted to the hospital for a few days now    The patient has been having problems with chest pain she has had a cardiology evaluation    But the patient states to me today she feels like this chest pain is just like her gallbladder pain    Several years ago she had gallbladder pain it took her physicians an extended period of time to find out that it was her gallbladder she underwent gallbladder removal surgery and she states that she continues to have the same pain    This pain is leading her for her to have chest pain    She has had a CT scan during this admission.  That CT scan revealed thickening of the esophagus but no dilation of the intra or extrahepatic biliary system the patient had an MRI earlier today but the results are currently pending we have been asked to see and evaluate the patient to try to find out the reason for the chest pain    Past Medical History:   Diagnosis Date    Arthritis     bilateral hands, bilateral knees, neck    Awareness under anesthesia     Blood transfusion     CAD (coronary artery disease)     Cardiomyopathy ([facility])     Coronary stent 08/27/2015    RCA stents on 3 occasions 2009, 2012, 2015    CVA (cerebral vascular accident) ([facility]) 01/19/2016    pt states determined she did not have a stroke    Febrile  pancreas, adrenals, kidneys, spleen are unremarkable.  Moderate calcific atherosclerosis. GI/Bowel: Bowel is nondilated without wall thickening. Pelvis: Unremarkable. Peritoneum/Retroperitoneum: No free air, free fluid, organized fluid collection, lymphadenopathy. Bones/Soft Tissues: No acute bone or soft tissue abnormality.     1. No acute process. 2.  Mild wall thickening of the esophagus.  Recommend clinical correlation for esophagitis and consider EGD.     XR CHEST PORTABLE  Result Date: 6/6/2025  EXAMINATION: ONE XRAY VIEW OF THE CHEST 6/6/2025 4:55 pm COMPARISON: 05/06/2016. HISTORY: ORDERING SYSTEM PROVIDED HISTORY: Chest Pain TECHNOLOGIST PROVIDED HISTORY: Reason for exam:->Chest Pain FINDINGS: A calcified granuloma in the right upper lobe is again noted.  There is no airspace consolidation or pleural effusion.  The cardiomediastinal silhouette, pulmonary vessels and interstitium appear normal.     No radiographic evidence of an acute cardiopulmonary process.       Labs Reviewed:     MRI pending  CT scan reviewed  Patient does have an elevation of her alk phos at 184 and her alkaline phosphatase has been persistently elevated all the way back to February 2025  Current liver enzymes including AST ALT are all normal patient had a mild bump of her AST    Assessment/Plan:     Principal Problem:    Chest pain  Active Problems:    Essential hypertension    Coronary artery disease of native artery of native heart with stable angina pectoris    Class 1 obesity    Elevated brain natriuretic peptide (BNP) level    Esophagitis    Acute kidney injury superimposed on stage 3b chronic kidney disease (HCC)    Elevated alkaline phosphatase level  Resolved Problems:    * No resolved hospital problems. 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

## 2025-06-11 NOTE — PROGRESS NOTES
[facility] Note    Patient Active Problem List   Diagnosis    Coronary artery disease due to lipid rich plaque    Palpitations    Chest pain    Mixed hyperlipidemia    SOB (shortness of breath)    Dizziness    Post PTCA    Hypertensive urgency    Insomnia    Encounter for electronic analysis of reveal event recorder    H/O carotid stenosis    Inappropriate sinus tachycardia    Obesity    Acute inferior myocardial infarction (HCC)    H/O percutaneous transluminal coronary angioplasty    Hypotension    Pericarditis    Essential hypertension    Recent myocardial infarction    Uncontrolled hypertension    GI bleed    Antiplatelet or antithrombotic long-term use    Tachycardia    Gastrointestinal hemorrhage    Syncope and collapse    Hx of myocardial infarction    CVA (cerebral vascular accident) (HCC)    Facial droop    Hemiparesis (HCC)    Coronary artery disease of native artery of native heart with stable angina pectoris    Stenosis of right carotid artery    Dyslipidemia    New onset seizure (HCC)    Status post insertion of drug-eluting stent into right coronary artery for coronary artery disease    Bilateral leg edema    Nonrheumatic aortic valve insufficiency    Carotid atherosclerosis    Pain due to cardiac device    Chest discomfort    Carotid stenosis, bilateral    Carotid stenosis, right    Hematoma of neck    Hematoma    Class 1 obesity    Elevated brain natriuretic peptide (BNP) level    Esophagitis    Acute kidney injury superimposed on stage 3b chronic kidney disease (HCC)    Elevated alkaline phosphatase level       Past Medical History:   has a past medical history of Arthritis, Awareness under anesthesia, Blood transfusion, CAD (coronary artery disease), Cardiomyopathy (HCC), Coronary stent, CVA (cerebral vascular accident) (HCC), Febrile seizure (HCC), Gastroesophageal reflux disease, History of blood transfusion, Hyperlipidemia, Hypertension, and Septic shock

## 2025-06-11 NOTE — PROGRESS NOTES
Gastroenterology Progress Note      Deidra Dumont is a 79 y.o. female patient.  Principal Problem:    Chest pain  Active Problems:    Essential hypertension    Coronary artery disease of native artery of native heart with stable angina pectoris    Class 1 obesity    Elevated brain natriuretic peptide (BNP) level    Esophagitis    Acute kidney injury superimposed on stage 3b chronic kidney disease (HCC)    Elevated alkaline phosphatase level  Resolved Problems:    * No resolved hospital problems. *      SUBJECTIVE: Patient was seen today her MRI was reviewed with her she does have a dilated bile duct    But with her normal liver enzymes it is hard for us to move on to do an ERCP    I was considering doing an endoscopic ultrasound to evaluate however endoscopic ultrasound equipment has issues and is not available of the rest of this week    Patient is going for heart cath tomorrow    ROS:  No fever, chills  No chest pain, palpitations  No SOB, cough  Gastrointestinal ROS: no abdominal pain, nausea, vomiting     Physical    VITALS:  /69   Pulse 95   Temp 97.5 °F (36.4 °C) (Temporal)   Resp 16   Ht 1.549 m (5' 1\")   Wt 72.7 kg (160 lb 3.2 oz)   SpO2 96%   BMI 30.27 kg/m²   TEMPERATURE:  Current - Temp: 97.5 °F (36.4 °C); Max - Temp  Av.9 °F (36.6 °C)  Min: 97 °F (36.1 °C)  Max: 99 °F (37.2 °C)    NAD  RRR, Nl s1s2  Lungs CTA Bilaterally, normal effort  Abdomen soft, ND, NT, no HSM, Bowel sounds normal.    Data    Data Review:    Recent Labs     25  0854 06/10/25  0425   WBC 6.4 6.9   HGB 10.5* 11.6*   HCT 31.6* 35.7*   MCV 93.3 96.0    247     Recent Labs     25  0854 06/10/25  0425 25  0826     140 138 136   K 4.7  4.7 5.1 4.5     109 106 104   CO2 19*  18* 17* 21   BUN 20  20 18 17   CREATININE 1.3*  1.3* 1.4* 1.5*     Recent Labs     25  0826   AST 26   ALT 24   BILITOT <0.2   ALKPHOS 194*     Recent Labs     25  0826   LIPASE 26.0

## 2025-06-12 DIAGNOSIS — I25.10 CORONARY ARTERY DISEASE DUE TO LIPID RICH PLAQUE: Primary | ICD-10-CM

## 2025-06-12 DIAGNOSIS — I25.83 CORONARY ARTERY DISEASE DUE TO LIPID RICH PLAQUE: Primary | ICD-10-CM

## 2025-06-12 LAB
ANION GAP SERPL CALCULATED.3IONS-SCNC: 12 MMOL/L (ref 3–16)
BUN SERPL-MCNC: 16 MG/DL (ref 7–20)
CALCIUM SERPL-MCNC: 9.2 MG/DL (ref 8.3–10.6)
CHLORIDE SERPL-SCNC: 108 MMOL/L (ref 99–110)
CO2 SERPL-SCNC: 18 MMOL/L (ref 21–32)
CREAT SERPL-MCNC: 1.2 MG/DL (ref 0.6–1.2)
DEPRECATED RDW RBC AUTO: 14.9 % (ref 12.4–15.4)
DEPRECATED RDW RBC AUTO: 15 % (ref 12.4–15.4)
ECHO BSA: 1.77 M2
GFR SERPLBLD CREATININE-BSD FMLA CKD-EPI: 46 ML/MIN/{1.73_M2}
GLUCOSE SERPL-MCNC: 110 MG/DL (ref 70–99)
HCT VFR BLD AUTO: 31.4 % (ref 36–48)
HCT VFR BLD AUTO: 32.2 % (ref 36–48)
HGB BLD-MCNC: 10.4 G/DL (ref 12–16)
HGB BLD-MCNC: 10.4 G/DL (ref 12–16)
MCH RBC QN AUTO: 30.3 PG (ref 26–34)
MCH RBC QN AUTO: 30.9 PG (ref 26–34)
MCHC RBC AUTO-ENTMCNC: 32.4 G/DL (ref 31–36)
MCHC RBC AUTO-ENTMCNC: 33.2 G/DL (ref 31–36)
MCV RBC AUTO: 93 FL (ref 80–100)
MCV RBC AUTO: 93.6 FL (ref 80–100)
PLATELET # BLD AUTO: 241 K/UL (ref 135–450)
PLATELET # BLD AUTO: 247 K/UL (ref 135–450)
PMV BLD AUTO: 8.4 FL (ref 5–10.5)
PMV BLD AUTO: 8.5 FL (ref 5–10.5)
POC ACT LR: 272 SEC
POC ACT LR: 308 SEC
POTASSIUM SERPL-SCNC: 4 MMOL/L (ref 3.5–5.1)
RBC # BLD AUTO: 3.38 M/UL (ref 4–5.2)
RBC # BLD AUTO: 3.44 M/UL (ref 4–5.2)
SODIUM SERPL-SCNC: 138 MMOL/L (ref 136–145)
WBC # BLD AUTO: 7.7 K/UL (ref 4–11)
WBC # BLD AUTO: 7.7 K/UL (ref 4–11)

## 2025-06-12 PROCEDURE — 6370000000 HC RX 637 (ALT 250 FOR IP): Performed by: STUDENT IN AN ORGANIZED HEALTH CARE EDUCATION/TRAINING PROGRAM

## 2025-06-12 PROCEDURE — 80048 BASIC METABOLIC PNL TOTAL CA: CPT

## 2025-06-12 PROCEDURE — 99232 SBSQ HOSP IP/OBS MODERATE 35: CPT | Performed by: STUDENT IN AN ORGANIZED HEALTH CARE EDUCATION/TRAINING PROGRAM

## 2025-06-12 PROCEDURE — C1725 CATH, TRANSLUMIN NON-LASER: HCPCS | Performed by: STUDENT IN AN ORGANIZED HEALTH CARE EDUCATION/TRAINING PROGRAM

## 2025-06-12 PROCEDURE — 2709999900 HC NON-CHARGEABLE SUPPLY: Performed by: STUDENT IN AN ORGANIZED HEALTH CARE EDUCATION/TRAINING PROGRAM

## 2025-06-12 PROCEDURE — 6370000000 HC RX 637 (ALT 250 FOR IP): Performed by: INTERNAL MEDICINE

## 2025-06-12 PROCEDURE — 92978 ENDOLUMINL IVUS OCT C 1ST: CPT | Performed by: STUDENT IN AN ORGANIZED HEALTH CARE EDUCATION/TRAINING PROGRAM

## 2025-06-12 PROCEDURE — 6360000002 HC RX W HCPCS: Performed by: NURSE PRACTITIONER

## 2025-06-12 PROCEDURE — 6370000000 HC RX 637 (ALT 250 FOR IP): Performed by: HOSPITALIST

## 2025-06-12 PROCEDURE — 2580000003 HC RX 258: Performed by: STUDENT IN AN ORGANIZED HEALTH CARE EDUCATION/TRAINING PROGRAM

## 2025-06-12 PROCEDURE — 36415 COLL VENOUS BLD VENIPUNCTURE: CPT

## 2025-06-12 PROCEDURE — 99153 MOD SED SAME PHYS/QHP EA: CPT | Performed by: STUDENT IN AN ORGANIZED HEALTH CARE EDUCATION/TRAINING PROGRAM

## 2025-06-12 PROCEDURE — 92928 PRQ TCAT PLMT NTRAC ST 1 LES: CPT | Performed by: STUDENT IN AN ORGANIZED HEALTH CARE EDUCATION/TRAINING PROGRAM

## 2025-06-12 PROCEDURE — C1760 CLOSURE DEV, VASC: HCPCS | Performed by: STUDENT IN AN ORGANIZED HEALTH CARE EDUCATION/TRAINING PROGRAM

## 2025-06-12 PROCEDURE — 93458 L HRT ARTERY/VENTRICLE ANGIO: CPT | Performed by: STUDENT IN AN ORGANIZED HEALTH CARE EDUCATION/TRAINING PROGRAM

## 2025-06-12 PROCEDURE — 99152 MOD SED SAME PHYS/QHP 5/>YRS: CPT | Performed by: STUDENT IN AN ORGANIZED HEALTH CARE EDUCATION/TRAINING PROGRAM

## 2025-06-12 PROCEDURE — 2580000003 HC RX 258: Performed by: INTERNAL MEDICINE

## 2025-06-12 PROCEDURE — C1874 STENT, COATED/COV W/DEL SYS: HCPCS | Performed by: STUDENT IN AN ORGANIZED HEALTH CARE EDUCATION/TRAINING PROGRAM

## 2025-06-12 PROCEDURE — C1894 INTRO/SHEATH, NON-LASER: HCPCS | Performed by: STUDENT IN AN ORGANIZED HEALTH CARE EDUCATION/TRAINING PROGRAM

## 2025-06-12 PROCEDURE — 2500000003 HC RX 250 WO HCPCS: Performed by: STUDENT IN AN ORGANIZED HEALTH CARE EDUCATION/TRAINING PROGRAM

## 2025-06-12 PROCEDURE — B2111ZZ FLUOROSCOPY OF MULTIPLE CORONARY ARTERIES USING LOW OSMOLAR CONTRAST: ICD-10-PCS | Performed by: STUDENT IN AN ORGANIZED HEALTH CARE EDUCATION/TRAINING PROGRAM

## 2025-06-12 PROCEDURE — 6360000002 HC RX W HCPCS: Performed by: STUDENT IN AN ORGANIZED HEALTH CARE EDUCATION/TRAINING PROGRAM

## 2025-06-12 PROCEDURE — 1200000000 HC SEMI PRIVATE

## 2025-06-12 PROCEDURE — 93571 IV DOP VEL&/PRESS C FLO 1ST: CPT | Performed by: STUDENT IN AN ORGANIZED HEALTH CARE EDUCATION/TRAINING PROGRAM

## 2025-06-12 PROCEDURE — C1769 GUIDE WIRE: HCPCS | Performed by: STUDENT IN AN ORGANIZED HEALTH CARE EDUCATION/TRAINING PROGRAM

## 2025-06-12 PROCEDURE — 4A033BC MEASUREMENT OF ARTERIAL PRESSURE, CORONARY, PERCUTANEOUS APPROACH: ICD-10-PCS | Performed by: STUDENT IN AN ORGANIZED HEALTH CARE EDUCATION/TRAINING PROGRAM

## 2025-06-12 PROCEDURE — 6370000000 HC RX 637 (ALT 250 FOR IP): Performed by: NURSE PRACTITIONER

## 2025-06-12 PROCEDURE — 6360000004 HC RX CONTRAST MEDICATION: Performed by: STUDENT IN AN ORGANIZED HEALTH CARE EDUCATION/TRAINING PROGRAM

## 2025-06-12 PROCEDURE — 4A023N7 MEASUREMENT OF CARDIAC SAMPLING AND PRESSURE, LEFT HEART, PERCUTANEOUS APPROACH: ICD-10-PCS | Performed by: STUDENT IN AN ORGANIZED HEALTH CARE EDUCATION/TRAINING PROGRAM

## 2025-06-12 PROCEDURE — C1753 CATH, INTRAVAS ULTRASOUND: HCPCS | Performed by: STUDENT IN AN ORGANIZED HEALTH CARE EDUCATION/TRAINING PROGRAM

## 2025-06-12 PROCEDURE — 6360000002 HC RX W HCPCS: Performed by: HOSPITALIST

## 2025-06-12 PROCEDURE — 027034Z DILATION OF CORONARY ARTERY, ONE ARTERY WITH DRUG-ELUTING INTRALUMINAL DEVICE, PERCUTANEOUS APPROACH: ICD-10-PCS | Performed by: STUDENT IN AN ORGANIZED HEALTH CARE EDUCATION/TRAINING PROGRAM

## 2025-06-12 PROCEDURE — 85347 COAGULATION TIME ACTIVATED: CPT

## 2025-06-12 PROCEDURE — 85027 COMPLETE CBC AUTOMATED: CPT

## 2025-06-12 PROCEDURE — C1887 CATHETER, GUIDING: HCPCS | Performed by: STUDENT IN AN ORGANIZED HEALTH CARE EDUCATION/TRAINING PROGRAM

## 2025-06-12 DEVICE — ANGIO-SEAL VIP VASCULAR CLOSURE DEVICE
Type: IMPLANTABLE DEVICE | Status: FUNCTIONAL
Brand: ANGIO-SEAL

## 2025-06-12 DEVICE — EVEROLIMUS-ELUTING PLATINUM CHROMIUM CORONARY STENT SYSTEM
Type: IMPLANTABLE DEVICE | Status: FUNCTIONAL
Brand: SYNERGY™ XD

## 2025-06-12 RX ORDER — NITROGLYCERIN 20 MG/100ML
INJECTION INTRAVENOUS CONTINUOUS PRN
Status: COMPLETED | OUTPATIENT
Start: 2025-06-12 | End: 2025-06-12

## 2025-06-12 RX ORDER — LIDOCAINE HYDROCHLORIDE 10 MG/ML
INJECTION, SOLUTION INFILTRATION; PERINEURAL PRN
Status: DISCONTINUED | OUTPATIENT
Start: 2025-06-12 | End: 2025-06-12 | Stop reason: HOSPADM

## 2025-06-12 RX ORDER — FENTANYL CITRATE 50 UG/ML
INJECTION, SOLUTION INTRAMUSCULAR; INTRAVENOUS PRN
Status: DISCONTINUED | OUTPATIENT
Start: 2025-06-12 | End: 2025-06-12 | Stop reason: HOSPADM

## 2025-06-12 RX ORDER — SODIUM CHLORIDE 0.9 % (FLUSH) 0.9 %
5-40 SYRINGE (ML) INJECTION PRN
Status: DISCONTINUED | OUTPATIENT
Start: 2025-06-12 | End: 2025-06-12 | Stop reason: HOSPADM

## 2025-06-12 RX ORDER — SODIUM CHLORIDE 0.9 % (FLUSH) 0.9 %
5-40 SYRINGE (ML) INJECTION EVERY 12 HOURS SCHEDULED
Status: DISCONTINUED | OUTPATIENT
Start: 2025-06-12 | End: 2025-06-12 | Stop reason: HOSPADM

## 2025-06-12 RX ORDER — CLOPIDOGREL 300 MG/1
600 TABLET, FILM COATED ORAL ONCE
Status: COMPLETED | OUTPATIENT
Start: 2025-06-12 | End: 2025-06-12

## 2025-06-12 RX ORDER — MIDAZOLAM HYDROCHLORIDE 1 MG/ML
INJECTION, SOLUTION INTRAMUSCULAR; INTRAVENOUS PRN
Status: DISCONTINUED | OUTPATIENT
Start: 2025-06-12 | End: 2025-06-12 | Stop reason: HOSPADM

## 2025-06-12 RX ORDER — SODIUM CHLORIDE 0.9 % (FLUSH) 0.9 %
5-40 SYRINGE (ML) INJECTION PRN
Status: DISCONTINUED | OUTPATIENT
Start: 2025-06-12 | End: 2025-06-13 | Stop reason: HOSPADM

## 2025-06-12 RX ORDER — TICAGRELOR 90 MG/1
TABLET, FILM COATED ORAL PRN
Status: DISCONTINUED | OUTPATIENT
Start: 2025-06-12 | End: 2025-06-12 | Stop reason: HOSPADM

## 2025-06-12 RX ORDER — SODIUM CHLORIDE 9 MG/ML
INJECTION, SOLUTION INTRAVENOUS PRN
Status: DISCONTINUED | OUTPATIENT
Start: 2025-06-12 | End: 2025-06-13 | Stop reason: HOSPADM

## 2025-06-12 RX ORDER — HYDRALAZINE HYDROCHLORIDE 20 MG/ML
INJECTION INTRAMUSCULAR; INTRAVENOUS PRN
Status: DISCONTINUED | OUTPATIENT
Start: 2025-06-12 | End: 2025-06-12 | Stop reason: HOSPADM

## 2025-06-12 RX ORDER — SODIUM CHLORIDE 9 MG/ML
INJECTION, SOLUTION INTRAVENOUS CONTINUOUS
Status: ACTIVE | OUTPATIENT
Start: 2025-06-12 | End: 2025-06-12

## 2025-06-12 RX ORDER — SODIUM CHLORIDE 0.9 % (FLUSH) 0.9 %
5-40 SYRINGE (ML) INJECTION EVERY 12 HOURS SCHEDULED
Status: DISCONTINUED | OUTPATIENT
Start: 2025-06-12 | End: 2025-06-13 | Stop reason: HOSPADM

## 2025-06-12 RX ORDER — CLOPIDOGREL BISULFATE 75 MG/1
75 TABLET ORAL DAILY
Status: DISCONTINUED | OUTPATIENT
Start: 2025-06-13 | End: 2025-06-13 | Stop reason: HOSPADM

## 2025-06-12 RX ORDER — SODIUM CHLORIDE 9 MG/ML
INJECTION, SOLUTION INTRAVENOUS PRN
Status: DISCONTINUED | OUTPATIENT
Start: 2025-06-12 | End: 2025-06-12 | Stop reason: HOSPADM

## 2025-06-12 RX ORDER — SODIUM CHLORIDE 9 MG/ML
INJECTION, SOLUTION INTRAVENOUS CONTINUOUS
Status: ACTIVE | OUTPATIENT
Start: 2025-06-12 | End: 2025-06-13

## 2025-06-12 RX ORDER — IOPAMIDOL 755 MG/ML
INJECTION, SOLUTION INTRAVASCULAR PRN
Status: DISCONTINUED | OUTPATIENT
Start: 2025-06-12 | End: 2025-06-12 | Stop reason: HOSPADM

## 2025-06-12 RX ORDER — ACETAMINOPHEN 325 MG/1
650 TABLET ORAL EVERY 4 HOURS PRN
Status: DISCONTINUED | OUTPATIENT
Start: 2025-06-12 | End: 2025-06-13 | Stop reason: HOSPADM

## 2025-06-12 RX ORDER — HEPARIN SODIUM 1000 [USP'U]/ML
INJECTION, SOLUTION INTRAVENOUS; SUBCUTANEOUS PRN
Status: DISCONTINUED | OUTPATIENT
Start: 2025-06-12 | End: 2025-06-12 | Stop reason: HOSPADM

## 2025-06-12 RX ORDER — DIPHENHYDRAMINE HYDROCHLORIDE 50 MG/ML
INJECTION, SOLUTION INTRAMUSCULAR; INTRAVENOUS PRN
Status: DISCONTINUED | OUTPATIENT
Start: 2025-06-12 | End: 2025-06-12 | Stop reason: HOSPADM

## 2025-06-12 RX ADMIN — HEPARIN SODIUM 5000 UNITS: 5000 INJECTION INTRAVENOUS; SUBCUTANEOUS at 14:02

## 2025-06-12 RX ADMIN — METHOCARBAMOL 500 MG: 500 TABLET ORAL at 18:12

## 2025-06-12 RX ADMIN — CLOPIDOGREL BISULFATE 600 MG: 300 TABLET, FILM COATED ORAL at 18:12

## 2025-06-12 RX ADMIN — METOPROLOL TARTRATE 100 MG: 50 TABLET, FILM COATED ORAL at 20:42

## 2025-06-12 RX ADMIN — ROSUVASTATIN CALCIUM 20 MG: 20 TABLET, FILM COATED ORAL at 20:42

## 2025-06-12 RX ADMIN — METHOCARBAMOL 500 MG: 500 TABLET ORAL at 14:02

## 2025-06-12 RX ADMIN — SODIUM CHLORIDE: 0.9 INJECTION, SOLUTION INTRAVENOUS at 06:26

## 2025-06-12 RX ADMIN — PANTOPRAZOLE SODIUM 40 MG: 40 INJECTION, POWDER, FOR SOLUTION INTRAVENOUS at 20:42

## 2025-06-12 RX ADMIN — POLYETHYLENE GLYCOL 3350 17 G: 17 POWDER, FOR SOLUTION ORAL at 18:12

## 2025-06-12 RX ADMIN — OXYCODONE HYDROCHLORIDE AND ACETAMINOPHEN 500 MG: 500 TABLET ORAL at 11:52

## 2025-06-12 RX ADMIN — VENLAFAXINE HYDROCHLORIDE 75 MG: 37.5 TABLET ORAL at 11:52

## 2025-06-12 RX ADMIN — SODIUM CHLORIDE: 9 INJECTION, SOLUTION INTRAVENOUS at 09:49

## 2025-06-12 RX ADMIN — METOPROLOL TARTRATE 100 MG: 50 TABLET, FILM COATED ORAL at 11:52

## 2025-06-12 RX ADMIN — VENLAFAXINE HYDROCHLORIDE 75 MG: 37.5 TABLET ORAL at 20:42

## 2025-06-12 RX ADMIN — SODIUM CHLORIDE: 0.9 INJECTION, SOLUTION INTRAVENOUS at 14:59

## 2025-06-12 RX ADMIN — ISOSORBIDE MONONITRATE 15 MG: 30 TABLET, EXTENDED RELEASE ORAL at 11:52

## 2025-06-12 RX ADMIN — Medication 10 ML: at 20:42

## 2025-06-12 RX ADMIN — ASPIRIN 81 MG: 81 TABLET, CHEWABLE ORAL at 08:08

## 2025-06-12 RX ADMIN — PANTOPRAZOLE SODIUM 40 MG: 40 INJECTION, POWDER, FOR SOLUTION INTRAVENOUS at 11:52

## 2025-06-12 RX ADMIN — Medication 10 ML: at 09:55

## 2025-06-12 RX ADMIN — AMLODIPINE BESYLATE 5 MG: 5 TABLET ORAL at 11:52

## 2025-06-12 RX ADMIN — HYDROCODONE BITARTRATE AND ACETAMINOPHEN 1 TABLET: 10; 325 TABLET ORAL at 18:25

## 2025-06-12 RX ADMIN — DIPHENHYDRAMINE HYDROCHLORIDE 25 MG: 25 TABLET ORAL at 20:42

## 2025-06-12 ASSESSMENT — ENCOUNTER SYMPTOMS
VOMITING: 0
EYE REDNESS: 0
ABDOMINAL DISTENTION: 0
DIARRHEA: 0
PHOTOPHOBIA: 0
CHEST TIGHTNESS: 0
EYE DISCHARGE: 0
FACIAL SWELLING: 0
ABDOMINAL PAIN: 0
SHORTNESS OF BREATH: 0
COUGH: 0
CONSTIPATION: 0
BLOOD IN STOOL: 0
NAUSEA: 0

## 2025-06-12 ASSESSMENT — PAIN SCALES - GENERAL
PAINLEVEL_OUTOF10: 0
PAINLEVEL_OUTOF10: 7
PAINLEVEL_OUTOF10: 0

## 2025-06-12 NOTE — PROGRESS NOTES
[facility] Note    Patient Active Problem List   Diagnosis    Coronary artery disease due to lipid rich plaque    Palpitations    Chest pain    Mixed hyperlipidemia    SOB (shortness of breath)    Dizziness    Post PTCA    Hypertensive urgency    Insomnia    Encounter for electronic analysis of reveal event recorder    H/O carotid stenosis    Inappropriate sinus tachycardia    Obesity    Acute inferior myocardial infarction (HCC)    H/O percutaneous transluminal coronary angioplasty    Hypotension    Pericarditis    Essential hypertension    Recent myocardial infarction    Uncontrolled hypertension    GI bleed    Antiplatelet or antithrombotic long-term use    Tachycardia    Gastrointestinal hemorrhage    Syncope and collapse    Hx of myocardial infarction    CVA (cerebral vascular accident) (HCC)    Facial droop    Hemiparesis (HCC)    Coronary artery disease of native artery of native heart with stable angina pectoris    Stenosis of right carotid artery    Dyslipidemia    New onset seizure (HCC)    Status post insertion of drug-eluting stent into right coronary artery for coronary artery disease    Bilateral leg edema    Nonrheumatic aortic valve insufficiency    Carotid atherosclerosis    Pain due to cardiac device    Chest discomfort    Carotid stenosis, bilateral    Carotid stenosis, right    Hematoma of neck    Hematoma    Class 1 obesity    Elevated brain natriuretic peptide (BNP) level    Esophagitis    Acute kidney injury superimposed on stage 3b chronic kidney disease (HCC)    Elevated alkaline phosphatase level       Past Medical History:   has a past medical history of Arthritis, Awareness under anesthesia, Blood transfusion, CAD (coronary artery disease), Cardiomyopathy (HCC), Coronary stent, CVA (cerebral vascular accident) (HCC), Febrile seizure (HCC), Gastroesophageal reflux disease, History of blood transfusion, Hyperlipidemia, Hypertension, and Septic shock  10.4 (L) 12.0 - 16.0 g/dL Final   06/12/2025 10.4 (L) 12.0 - 16.0 g/dL Final     Hematocrit   Date Value Ref Range Status   06/12/2025 32.2 (L) 36.0 - 48.0 % Final   06/12/2025 31.4 (L) 36.0 - 48.0 % Final     WBC   Date Value Ref Range Status   06/12/2025 7.7 4.0 - 11.0 K/uL Final   06/12/2025 7.7 4.0 - 11.0 K/uL Final     Platelets   Date Value Ref Range Status   06/12/2025 247 135 - 450 K/uL Final   06/12/2025 241 135 - 450 K/uL Final     Lab Results   Component Value Date    CREATININE 1.2 06/12/2025    BUN 16 06/12/2025     06/12/2025    K 4.0 06/12/2025     06/12/2025    CO2 18 (L) 06/12/2025     Urinalysis specific gravity of 1.010, pH 5, protein 30, blood negative, WBC 31    VBG 7.42/24.5    EF 65%      Assessment/Plan:  1.  CHAPO on CKD 3a- baseline creatinine 1.4-1.5.   Probably from prerenal azotemia.  History of CHAPO requiring HD with recovery.  Creatinine 1.2 today.  Received normal saline, will continue for 12 more hours.  2.  Low serum bicarbonate-VBG showing respiratory alkalosis.  No need for bicarbonate replacement.   3.  Hypotension-BP currently higher.  Does not appear hypervolemic at this time.  Started amlodipine 5 mg daily.  4.  Anemia-follow hemoglobin closely  5.  History of CAD-on statin.  Cardiac cath done today.  Follow creatinine tomorrow.  IV fluid administration.  6.  Chest pain-continue PPI.  Cardiology following  7.  E. coli UTI-s/p ceftriaxone  8.  Disposition-hopefully tomorrow if creatinine stable    Discussed with Cardiology    Garth Casas MD

## 2025-06-12 NOTE — PROGRESS NOTES
Speech Language Pathology  Attempt    Deidra Dumont  1945    SLP attempted to see the pt for dysphagia treatment follow up. Pt's chart reviewed and consult completed with pt's RN, RN reports pt is sleeping, SLP and RN aroused pt and pt requested to continue resting. Will hold dysphagia treatment and re-attempt as schedule allows and pt appropriate.     Yani An MS, CCC-SLP, CBIS  Speech-Language Pathologist

## 2025-06-12 NOTE — PROGRESS NOTES
likely secondary to that    If she continues to have chest pain we can do an upper endoscopy    I do not think that this patient needs an ERCP or other evaluation for duodenal papillary stenosis unless she was to have a marked elevation of her liver enzyme    Tejas Yo MD

## 2025-06-12 NOTE — PROGRESS NOTES
[facility] Daily Progress Note      Admit Date:  6/6/2025    Chief Complaint:  Chest pain    Subjective:  Ms. Sea Dumont is resting in her room. Discussed LHC and she is agreeable to proceeding. Discussed Cr with Dr. Casas.     Objective:   /82   Pulse 87   Temp 98 °F (36.7 °C) (Temporal)   Resp 15   Ht 1.549 m (5' 1\")   Wt 73.5 kg (162 lb 1.6 oz)   SpO2 95%   BMI 30.63 kg/m²     Intake/Output Summary (Last 24 hours) at 6/12/2025 0811  Last data filed at 6/12/2025 0620  Gross per 24 hour   Intake 240 ml   Output 1100 ml   Net -860 ml       Physical Exam:  General:  Awake, alert, NAD  Skin:  Warm and dry  Neck:  JVD not visualized  Chest:  CTAB, Comfortable on room air  Cardiovascular:  RRR S1S2, no S3, No murmur  Abdomen:  Soft, ND, NT, No HSM  Extremities:  No edema    Medications:    sodium chloride flush  5-40 mL IntraVENous 2 times per day    metoprolol tartrate  100 mg Oral BID    ascorbic acid  500 mg Oral Daily    amLODIPine  5 mg Oral Daily    heparin (porcine)  5,000 Units SubCUTAneous 3 times per day    isosorbide mononitrate  15 mg Oral Daily    rosuvastatin  20 mg Oral Nightly    methocarbamol  500 mg Oral TID    venlafaxine  75 mg Oral BID    sodium chloride flush  5-40 mL IntraVENous 2 times per day    aspirin  81 mg Oral Daily    pantoprazole  40 mg IntraVENous BID      sodium chloride      sodium chloride 75 mL/hr at 06/12/25 0626    sodium chloride       sodium chloride flush, sodium chloride, hydrOXYzine HCl, sodium chloride flush, diphenhydrAMINE, HYDROcodone-acetaminophen, sodium chloride flush, sodium chloride, ondansetron **OR** ondansetron, acetaminophen **OR** acetaminophen, polyethylene glycol, sulfur hexafluoride microspheres    TELEMETRY (Personally reviewed by me): Sinus     Lab Data:  CBC:   Recent Labs     06/09/25  0854 06/10/25  0425   WBC 6.4 6.9   HGB 10.5* 11.6*   HCT 31.6* 35.7*   MCV 93.3 96.0    247     BMP:   Recent Labs     06/09/25  0854

## 2025-06-12 NOTE — PRE SEDATION
Brief Pre-Op Note/Sedation Assessment      Deidra Dumont  1945  [id number]  8:10 AM    Planned Procedure: Cardiac Catheterization Procedure  Post Procedure Plan: Return to same level of care  Consent: I have discussed with the patient and/or the patient representative the indication, alternatives, and the possible risks and/or complications of the planned procedure and the anesthesia methods. The patient and/or patient representative appear to understand and agree to proceed.        Chief Complaint:   Chest Pain/Pressure  Dyspnea on Exertion  Dyspnea      Indications for Cath Procedure:  Presentation:  Suspected CAD  2.  Anginal Classification within 2 weeks:  CCS II - Slight limitation, with angina only during vigorous physical activity  3.  Angina Symptoms Assessment:  Typical Chest Pain  4.  Heart Failure Class within last 2 weeks:  No symptoms  5.  Cardiovascular Instability:  No    Prior Ischemic Workup/Eval:  Pre-Procedural Medications: Yes: Aspirin, Beta Blockers, Ca Channel Blockers, Long Acting Nitrates (Any), and STATIN  2.   Stress Test Completed?  Yes:  Stress or Imaging Studies Performed (within ANY time period):   Type:  Stress Nuclear  Results:  Indeterminate Extent of Ischemia:  Low Risk (<1% annual death or MI)    Does Patient need surgery?  Cath Valve Surgery:  No    Pre-Procedure Medical History:  Vital Signs:  /82   Pulse 87   Temp 98 °F (36.7 °C) (Temporal)   Resp 15   Ht 1.549 m (5' 1\")   Wt 73.5 kg (162 lb 1.6 oz)   SpO2 95%   BMI 30.63 kg/m²     Allergies:    Allergies   Allergen Reactions    Codeine Itching and Hives    Nsaids      Bleeding ulcer  Gi bleed    Prochlorperazine Anaphylaxis     compazine    Prochlorperazine Edisylate Swelling    Sulfa Antibiotics Itching and Hives    Coreg [Carvedilol] Itching    Turmeric Swelling and Rash     Itching     Medications:    Current Facility-Administered Medications   Medication Dose Route Frequency Provider Last Rate  MD DESMOND at [facility] OR    CAROTID ENDARTERECTOMY Left 07/08/2014    LEFT CAROTID ENDARTERECTOMY               CAROTID ENDARTERECTOMY Right 02/13/2025    RIGHT CAROTID ENDARTERECTOMY performed by Eliazar Vaughn MD at [facility] OR    CHOLECYSTECTOMY      COLONOSCOPY  2007    CORONARY ANGIOPLASTY WITH STENT PLACEMENT  03/2009    @[facility] - DWAYNE X 2 to RCA    CORONARY ANGIOPLASTY WITH STENT PLACEMENT  01/24/2012    DWAYNE Ribeiro to mRCA    CORONARY ANGIOPLASTY WITH STENT PLACEMENT  08/27/2015    [facility] Dr Wang RICE/Pronto to pRCA    EP STUDY/ABLATION      EYE SURGERY  12/19/2012    bilateral    FRACTURE SURGERY  01/2013    ORIF right wrist    HAND SURGERY      Left hand    HIP FRACTURE SURGERY Right 2024    HYSTERECTOMY (CERVIX STATUS UNKNOWN)      INSERTABLE CARDIAC MONITOR  10/15/2014    Dr Burgess, [facility]; removed 3 years later    INTRACAPSULAR CATARACT EXTRACTION Bilateral 12/31/2012    rt     OTHER SURGICAL HISTORY  01/17/2014    excision right nasal dorsum epidermal cyst with frozen section lesion superior nasal dorsem    SKIN BIOPSY      UPPER GASTROINTESTINAL ENDOSCOPY  06/20/2016             Pre-Sedation:  Pre-Sedation Documentation and Exam:  I have assessed the patient and reviewed the H&P on the chart.    Prior History of Anesthesia Complications:   none    Modified Mallampati:  III (soft palate, base of uvula visible)    ASA Classification:  Class 2 - A normal healthy patient with mild systemic disease    Pedro Scale:  Activity:  2 - Able to move 4 extremities voluntarily on command  Respiration:  2 - Able to breathe deeply and cough freely  Circulation:  2 - BP+/- 20mmHg of normal  Consciousness:  2 - Fully awake  Oxygen Saturation (color):  2 - Able to maintain oxygen saturation >92% on room air    Sedation/Anesthesia Plan:  Guard the patient's safety and welfare.  Minimize physical discomfort and pain.  Minimize negative psychological responses to treatment by providing sedation and analgesia and maximize the potential

## 2025-06-12 NOTE — PROGRESS NOTES
[facility]    6/12/2025 11:36 AM        Name: Deidra Dumont .              Admitted: 6/6/2025  Primary Care Provider: Ilya Lomax MD (Tel: [phone])      Chief complaint: 80 yo female with hx CAD/stents. Presented from cardiology office with chest pain associated with nausea. Admitted with chest pain and CHAPO.     Subjective:  Resting in bed. S/p LHC with stent placed this AM. Denies chest pain, continues to note RUQ discomfort. No shortness of breath, nausea, constipation, diarrhea.    Reviewed interval ancillary notes    Current Medications  0.9 % sodium chloride infusion, Continuous  sodium chloride flush 0.9 % injection 5-40 mL, 2 times per day  sodium chloride flush 0.9 % injection 5-40 mL, PRN  0.9 % sodium chloride infusion, PRN  acetaminophen (TYLENOL) tablet 650 mg, Q4H PRN  clopidogrel (PLAVIX) tablet 600 mg, Once  [START ON 6/13/2025] clopidogrel (PLAVIX) tablet 75 mg, Daily  metoprolol tartrate (LOPRESSOR) tablet 100 mg, BID  ascorbic acid (VITAMIN C) tablet 500 mg, Daily  amLODIPine (NORVASC) tablet 5 mg, Daily  hydrOXYzine HCl (ATARAX) tablet 10 mg, Once PRN  heparin (porcine) injection 5,000 Units, 3 times per day  isosorbide mononitrate (IMDUR) extended release tablet 15 mg, Daily  rosuvastatin (CRESTOR) tablet 20 mg, Nightly  diphenhydrAMINE (BENADRYL) tablet 25 mg, Q8H PRN  HYDROcodone-acetaminophen (NORCO)  MG per tablet 1 tablet, Q6H PRN  methocarbamol (ROBAXIN) tablet 500 mg, TID  venlafaxine (EFFEXOR) tablet 75 mg, BID  ondansetron (ZOFRAN-ODT) disintegrating tablet 4 mg, Q8H PRN   Or  ondansetron (ZOFRAN) injection 4 mg, Q6H PRN  acetaminophen (TYLENOL) suppository 650 mg, Q6H PRN  polyethylene glycol (GLYCOLAX) packet 17 g, Daily PRN  aspirin chewable tablet 81 mg, Daily  sulfur hexafluoride microspheres (LUMASON) 60.7-25 MG injection 2 mL, ONCE PRN  pantoprazole

## 2025-06-13 VITALS
DIASTOLIC BLOOD PRESSURE: 87 MMHG | TEMPERATURE: 98.2 F | SYSTOLIC BLOOD PRESSURE: 148 MMHG | HEIGHT: 61 IN | RESPIRATION RATE: 16 BRPM | OXYGEN SATURATION: 97 % | BODY MASS INDEX: 31 KG/M2 | HEART RATE: 81 BPM | WEIGHT: 164.2 LBS

## 2025-06-13 LAB
ALBUMIN SERPL-MCNC: 3.7 G/DL (ref 3.4–5)
ALP SERPL-CCNC: 166 U/L (ref 40–129)
ALT SERPL-CCNC: 21 U/L (ref 10–40)
ANION GAP SERPL CALCULATED.3IONS-SCNC: 13 MMOL/L (ref 3–16)
AST SERPL-CCNC: 28 U/L (ref 15–37)
BILIRUB DIRECT SERPL-MCNC: <0.1 MG/DL (ref 0–0.3)
BILIRUB INDIRECT SERPL-MCNC: ABNORMAL MG/DL (ref 0–1)
BILIRUB SERPL-MCNC: <0.2 MG/DL (ref 0–1)
BUN SERPL-MCNC: 15 MG/DL (ref 7–20)
CALCIUM SERPL-MCNC: 9 MG/DL (ref 8.3–10.6)
CHLORIDE SERPL-SCNC: 108 MMOL/L (ref 99–110)
CO2 SERPL-SCNC: 19 MMOL/L (ref 21–32)
CREAT SERPL-MCNC: 1.3 MG/DL (ref 0.6–1.2)
DEPRECATED RDW RBC AUTO: 14.9 % (ref 12.4–15.4)
EKG ATRIAL RATE: 88 BPM
EKG DIAGNOSIS: NORMAL
EKG P AXIS: 70 DEGREES
EKG P-R INTERVAL: 184 MS
EKG Q-T INTERVAL: 384 MS
EKG QRS DURATION: 96 MS
EKG QTC CALCULATION (BAZETT): 464 MS
EKG R AXIS: 33 DEGREES
EKG T AXIS: 22 DEGREES
EKG VENTRICULAR RATE: 88 BPM
GFR SERPLBLD CREATININE-BSD FMLA CKD-EPI: 42 ML/MIN/{1.73_M2}
GLUCOSE SERPL-MCNC: 122 MG/DL (ref 70–99)
HCT VFR BLD AUTO: 30.5 % (ref 36–48)
HGB BLD-MCNC: 10.2 G/DL (ref 12–16)
MCH RBC QN AUTO: 31.2 PG (ref 26–34)
MCHC RBC AUTO-ENTMCNC: 33.5 G/DL (ref 31–36)
MCV RBC AUTO: 93.2 FL (ref 80–100)
PLATELET # BLD AUTO: 248 K/UL (ref 135–450)
PMV BLD AUTO: 8.3 FL (ref 5–10.5)
POTASSIUM SERPL-SCNC: 4 MMOL/L (ref 3.5–5.1)
PROT SERPL-MCNC: 6.6 G/DL (ref 6.4–8.2)
RBC # BLD AUTO: 3.27 M/UL (ref 4–5.2)
SODIUM SERPL-SCNC: 140 MMOL/L (ref 136–145)
WBC # BLD AUTO: 5.9 K/UL (ref 4–11)

## 2025-06-13 PROCEDURE — 99233 SBSQ HOSP IP/OBS HIGH 50: CPT | Performed by: NURSE PRACTITIONER

## 2025-06-13 PROCEDURE — 80076 HEPATIC FUNCTION PANEL: CPT

## 2025-06-13 PROCEDURE — 6370000000 HC RX 637 (ALT 250 FOR IP): Performed by: INTERNAL MEDICINE

## 2025-06-13 PROCEDURE — 97530 THERAPEUTIC ACTIVITIES: CPT

## 2025-06-13 PROCEDURE — 92526 ORAL FUNCTION THERAPY: CPT

## 2025-06-13 PROCEDURE — 6360000002 HC RX W HCPCS: Performed by: NURSE PRACTITIONER

## 2025-06-13 PROCEDURE — 6370000000 HC RX 637 (ALT 250 FOR IP): Performed by: NURSE PRACTITIONER

## 2025-06-13 PROCEDURE — 80048 BASIC METABOLIC PNL TOTAL CA: CPT

## 2025-06-13 PROCEDURE — 94760 N-INVAS EAR/PLS OXIMETRY 1: CPT

## 2025-06-13 PROCEDURE — 97116 GAIT TRAINING THERAPY: CPT

## 2025-06-13 PROCEDURE — 6370000000 HC RX 637 (ALT 250 FOR IP): Performed by: HOSPITALIST

## 2025-06-13 PROCEDURE — 2500000003 HC RX 250 WO HCPCS: Performed by: STUDENT IN AN ORGANIZED HEALTH CARE EDUCATION/TRAINING PROGRAM

## 2025-06-13 PROCEDURE — 36415 COLL VENOUS BLD VENIPUNCTURE: CPT

## 2025-06-13 PROCEDURE — 93010 ELECTROCARDIOGRAM REPORT: CPT | Performed by: INTERNAL MEDICINE

## 2025-06-13 PROCEDURE — 97165 OT EVAL LOW COMPLEX 30 MIN: CPT

## 2025-06-13 PROCEDURE — 97161 PT EVAL LOW COMPLEX 20 MIN: CPT

## 2025-06-13 PROCEDURE — 85027 COMPLETE CBC AUTOMATED: CPT

## 2025-06-13 PROCEDURE — 93005 ELECTROCARDIOGRAM TRACING: CPT | Performed by: STUDENT IN AN ORGANIZED HEALTH CARE EDUCATION/TRAINING PROGRAM

## 2025-06-13 PROCEDURE — 6360000002 HC RX W HCPCS: Performed by: HOSPITALIST

## 2025-06-13 PROCEDURE — 6370000000 HC RX 637 (ALT 250 FOR IP): Performed by: STUDENT IN AN ORGANIZED HEALTH CARE EDUCATION/TRAINING PROGRAM

## 2025-06-13 RX ORDER — DICYCLOMINE HYDROCHLORIDE 10 MG/1
10 CAPSULE ORAL DAILY
Qty: 30 CAPSULE | Refills: 1 | Status: SHIPPED | OUTPATIENT
Start: 2025-06-13

## 2025-06-13 RX ORDER — AMLODIPINE BESYLATE 5 MG/1
5 TABLET ORAL DAILY
Qty: 30 TABLET | Refills: 2 | Status: ON HOLD | OUTPATIENT
Start: 2025-06-14 | End: 2025-06-22 | Stop reason: HOSPADM

## 2025-06-13 RX ORDER — METOPROLOL TARTRATE 100 MG/1
100 TABLET ORAL 2 TIMES DAILY
Qty: 60 TABLET | Refills: 1 | Status: SHIPPED | OUTPATIENT
Start: 2025-06-13

## 2025-06-13 RX ORDER — ISOSORBIDE MONONITRATE 30 MG/1
15 TABLET, EXTENDED RELEASE ORAL DAILY
Qty: 30 TABLET | Refills: 0 | Status: SHIPPED | OUTPATIENT
Start: 2025-06-14 | End: 2025-06-20 | Stop reason: SDUPTHER

## 2025-06-13 RX ORDER — PANTOPRAZOLE SODIUM 40 MG/1
40 TABLET, DELAYED RELEASE ORAL
Qty: 30 TABLET | Refills: 1 | Status: SHIPPED | OUTPATIENT
Start: 2025-06-13

## 2025-06-13 RX ADMIN — CLOPIDOGREL BISULFATE 75 MG: 75 TABLET, FILM COATED ORAL at 09:50

## 2025-06-13 RX ADMIN — HYDROCODONE BITARTRATE AND ACETAMINOPHEN 1 TABLET: 10; 325 TABLET ORAL at 01:51

## 2025-06-13 RX ADMIN — PANTOPRAZOLE SODIUM 40 MG: 40 INJECTION, POWDER, FOR SOLUTION INTRAVENOUS at 09:51

## 2025-06-13 RX ADMIN — HYDROCODONE BITARTRATE AND ACETAMINOPHEN 1 TABLET: 10; 325 TABLET ORAL at 13:13

## 2025-06-13 RX ADMIN — METHOCARBAMOL 500 MG: 500 TABLET ORAL at 13:13

## 2025-06-13 RX ADMIN — METHOCARBAMOL 500 MG: 500 TABLET ORAL at 16:58

## 2025-06-13 RX ADMIN — ISOSORBIDE MONONITRATE 15 MG: 30 TABLET, EXTENDED RELEASE ORAL at 09:50

## 2025-06-13 RX ADMIN — METOPROLOL TARTRATE 100 MG: 50 TABLET, FILM COATED ORAL at 09:51

## 2025-06-13 RX ADMIN — Medication 10 ML: at 09:51

## 2025-06-13 RX ADMIN — OXYCODONE HYDROCHLORIDE AND ACETAMINOPHEN 500 MG: 500 TABLET ORAL at 09:50

## 2025-06-13 RX ADMIN — ASPIRIN 81 MG: 81 TABLET, CHEWABLE ORAL at 09:51

## 2025-06-13 RX ADMIN — HEPARIN SODIUM 5000 UNITS: 5000 INJECTION INTRAVENOUS; SUBCUTANEOUS at 13:13

## 2025-06-13 RX ADMIN — AMLODIPINE BESYLATE 5 MG: 5 TABLET ORAL at 09:51

## 2025-06-13 RX ADMIN — METHOCARBAMOL 500 MG: 500 TABLET ORAL at 09:50

## 2025-06-13 RX ADMIN — VENLAFAXINE HYDROCHLORIDE 75 MG: 37.5 TABLET ORAL at 09:51

## 2025-06-13 ASSESSMENT — PAIN SCALES - GENERAL
PAINLEVEL_OUTOF10: 5
PAINLEVEL_OUTOF10: 3
PAINLEVEL_OUTOF10: 0
PAINLEVEL_OUTOF10: 7
PAINLEVEL_OUTOF10: 7
PAINLEVEL_OUTOF10: 5

## 2025-06-13 ASSESSMENT — ENCOUNTER SYMPTOMS
CHEST TIGHTNESS: 0
NAUSEA: 0
FACIAL SWELLING: 0
BLOOD IN STOOL: 0
CONSTIPATION: 0
COUGH: 0
SHORTNESS OF BREATH: 0
VOMITING: 0
PHOTOPHOBIA: 0
ABDOMINAL PAIN: 0
DIARRHEA: 0
EYE DISCHARGE: 0
EYE REDNESS: 0
ABDOMINAL DISTENTION: 0

## 2025-06-13 ASSESSMENT — PAIN DESCRIPTION - ORIENTATION
ORIENTATION: RIGHT

## 2025-06-13 ASSESSMENT — PAIN DESCRIPTION - DESCRIPTORS
DESCRIPTORS: ACHING
DESCRIPTORS: DISCOMFORT
DESCRIPTORS: ACHING
DESCRIPTORS: ACHING

## 2025-06-13 ASSESSMENT — PAIN DESCRIPTION - LOCATION
LOCATION: GENERALIZED
LOCATION: ABDOMEN

## 2025-06-13 NOTE — PROGRESS NOTES
Data- discharge order received, pt verbalized agreement to discharge, disposition to previous residence, no needs for HHC/DME.     Action- discharge instructions prepared and given to pt, pt verbalized understanding. Medication information packet given r/t NEW and/or CHANGED prescriptions emphasizing name/purpose/side effects, pt verbalized understanding. Discharge instruction summary: Diet- as tolerated, Activity- up as tolerated, Primary Care Physician as follows: Ilya Lomax -[phone] f/u appointment within a week of dc, immunizations reviewed and updated, prescription medications filled at pharmacy [facility]. Inpatient surgical procedure precautions reviewed: 60 min of CHF Education reviewed. Pt/ Family has had a total of 60 minutes CHF education this admission encounter.     1. WEIGHT: Admit Weight - Scale: 72.1 kg (159 lb) (06/06/25 1640)        Today  Weight - Scale: 74.5 kg (164 lb 3.2 oz) (06/13/25 0340)       2. O2 SAT.: SpO2: 97 % (06/13/25 1315)    Response- Pt belongings gathered, IV removed. Disposition is home (no HHC/DME needs), transported with belongings, taken to lobby via w/c w/ RN, no complications.

## 2025-06-13 NOTE — CARE COORDINATION
06/13/25 1459   IMM Letter   IMM Letter given to Patient/Family/Significant other/Guardian/POA/by: IMM given   IMM Letter date given: 06/13/25   IMM Letter time given: 0258

## 2025-06-13 NOTE — PROGRESS NOTES
Speech Language Pathology  [facility]  [phone]  SLP Dysphagia Treatment       Patient: Deidra Dumont   : 1945   MRN: [id number]      Evaluation Date: 2025      Admitting Dx: Chest pain [R07.9]  Difficulty breathing [R06.89]  Upper abdominal pain [R10.10]  Chest pain, unspecified type [R07.9]  Nausea and vomiting, unspecified vomiting type [R11.2]  Treatment Diagnosis: Oropharyngeal Dysphagia   Pain: Did not state                                  Recommendations      Recommended Diet and Intervention 2025:  Diet Solids Recommendation:  Regular texture diet  Liquid Consistency Recommendation:  Thin liquids  Recommended form of Meds: Meds whole with water or Meds in puree     Pt d/c from speech service as RN and pt report she is tolerating her current diet. Please re-consult if future concerns arise.     Compensatory strategies: Aspiration Precautions , Eat or Feed Slowly, Reflux Precautions , Remain Upright 30-45 min , Small Bites and Sips , Upright as possible with all PO intake     Discharge Recommendations:  No further follow-up appears indicated at this time.     History/Course of Treatment     H&P: 2025  \"Deidra Dumont is a 79 y.o. female  with a pmh of class I obesity, essential hypertension, CKD stage IIIb, and CAD with coronary stents who presents with excruciating left-sided chest pain that radiates to her back.  Associated with her symptoms is nausea.  Patient went to her PCPs office for routine appointment and was instructed to come to the emergency department.  Patient reports that outpatient stress test was being considered.\"    Imaging:  Chest X-ray: 2025  IMPRESSION:  No radiographic evidence of an acute cardiopulmonary process.    CT Chest: 2025  IMPRESSION:  1. No acute process.  2.  Mild wall thickening of the esophagus.  Recommend clinical correlation  for esophagitis and consider EGD.    Prior

## 2025-06-13 NOTE — PROGRESS NOTES
[facility]   Cardiology Progress Note     Date: 6/13/2025  Admit Date: 6/6/2025     Reason for consultation:     Chief Complaint   Patient presents with    Chest Pain     Advised to come to emergency room by cardiology. N/v today, chest pain today. History of prior coronary artery diease + stents.       History of Present Illness: History obtained from patient and medical record.     Deidra Dumont is a 79 y.o. female who presented to the hospital with complaints of chest pain. I have been asked to provide consultation regarding further management and testing. The patient reports that she has been having shortness of breath. She reports that she came to the hospital for exertional chest pain and vomiting. She states that it lasted approximately 2 hours. She states that it feels different from her prior cardiac symptoms. She isnt sure if it is related to a chicken salad. She denies any radiation to shoulder, jaw, arm. No associated diaphoresis.  (per consult note)    Interval Hx: Today, she reports her breathing feels better and left chest discomfort resolved. Significant RLQ abd pain. She is tearful about this. Tele stable.    Right groin procedure site c/d/I. No hematoma. mild bruising. Good pulses, color, warmth, and sensation to that extremity.     Patient seen and examined. Clinical notes reviewed. Telemetry reviewed.  No new complaints today. No major events overnight.   Denies having chest pain, palpitations, shortness of breath, orthopnea/PND, cough, or dizziness at the time of this visit.      Past Medical History:  Past Medical History:   Diagnosis Date    Arthritis     bilateral hands, bilateral knees, neck    Awareness under anesthesia     Blood transfusion     CAD (coronary artery disease)     Cardiomyopathy ([facility])     Coronary stent 08/27/2015    RCA stents on 3 occasions 2009, 2012, 2015    CVA (cerebral vascular accident) ([facility]) 01/19/2016    pt states determined she did not have a

## 2025-06-13 NOTE — DISCHARGE SUMMARY
[facility]    Patient Demographics    Patient. Deidra Dumont  Date of Birth. 1945  MRN. [id number]     Primary care provider. Ilya Lomax MD  (Tel: [phone])    Admit date: 6/6/2025    Discharge date (blank if same as Note Date):   Note Date: 6/13/2025     Reason for Hospitalization.   Chief Complaint   Patient presents with    Chest Pain     Advised to come to emergency room by cardiology. N/v today, chest pain today. History of prior coronary artery diease + stents.         Significant Findings.   Principal Problem:    Chest pain  Active Problems:    Essential hypertension    Coronary artery disease of native artery of native heart with stable angina pectoris    Class 1 obesity    Elevated brain natriuretic peptide (BNP) level    Esophagitis    Acute kidney injury superimposed on stage 3b chronic kidney disease (HCC)    Elevated alkaline phosphatase level  Resolved Problems:    * No resolved hospital problems. *       Problems and results from this hospitalization that need follow up.  GI follow up for consideration outpatient ERCP when antiplatelets can be held.  CAD s/p stent  HTN   CHAPO on CKD    Significant test results and incidental findings.  MRI ABDOMEN WO CONTRAST   Preliminary Result   Mild intra and extrahepatic biliary duct dilation without obvious   choledocholithiasis. Findings likely reflect postcholecystectomy changes.   Recommend correlation with LFTs.           NM LUNG VENT/PERFUSION (VQ)   Final Result   Normal study.  No evidence for pulmonary embolism.           CT CHEST ABDOMEN PELVIS WO CONTRAST Additional Contrast? None   Final Result   1. No acute process.   2.  Mild wall thickening of the esophagus.  Recommend clinical correlation   for esophagitis and consider EGD.           XR CHEST PORTABLE   Final Result   No radiographic evidence of an acute

## 2025-06-13 NOTE — PROGRESS NOTES
Low Risk Nutrition Note     Reason for Visit:   Length of Stay    Nutrition Assessment:  Pt currently NPO for procedure. Previously on cardiac diet and reports good appetite and po intake. Pt has no nutrition questions or concerns at this time. Will follow as low risk.     Current Nutrition Therapies:    ADULT DIET; Regular; Low Fat/Low Chol/High Fiber/2 gm Na    Anthropometrics:   Current Height: 154.9 cm (5' 1\")  Current Weight - Scale: 74.5 kg (164 lb 3.2 oz)      Monitoring and Evaluation:  No nutrition diagnosis. Patient will be monitored per nutrition standards of care.     Consult Dietitian if nutrition intervention essential to patient care is needed.     Discharge Planning:  No needs    JP HERNANDEZ, RD, LD

## 2025-06-13 NOTE — DISCHARGE INSTRUCTIONS
Coronary Angiogram: About This Test  What is a coronary angiogram?     A coronary angiogram is a test to look at the large blood vessels of your heart (coronary arteries). These blood vessels feed blood, oxygen, and nutrients to your heart.  Why is this test done?  This test is done to check blood flow in your coronary arteries. It can show the size and location of narrowed or blocked sections of an artery.  It's done for people who have coronary artery disease, also known as heart disease. The test can show how serious the disease is and how best to treat it. Or it can be done for people who have symptoms of heart disease to find out if there is a problem with the artery.  The Doctor can look at the shape of your heart, the motion of the heart, and valves in the heart.   What happens during the test?  You will get medicine to help you relax.  A thin tube called a catheter is put into a blood vessel in your groin or arm.  You will get a shot to numb the skin where the catheter goes in. You may feel pressure when the doctor moves the catheter through your blood vessel into your heart.  Dye is put into your coronary arteries through the catheter. Your doctor can see the dye as it moves through the arteries. This lets your doctor look for areas that are narrowed or blocked.  You may feel hot or flushed for several seconds when the dye is put in.  How long does it take?  The test will take about 30 minutes to an hour. But you need time to get ready for it and time to recover. If a problem is found and the doctor treats it, it can take a few hours longer.     Care of your puncture site:  Remove bandage 24 hours after the procedure.  May shower in 24 hours but do not sit in a bathtub/pool of water for 5 days or until the wound is healed.  Inspect the site daily and gently clean using soap and water while standing in the shower.  Dry thoroughly and apply a Band-Aid that covers the entire site. Do not apply powder or  lotion.    Normal Observations:  Soreness or tenderness which may last one week.  Mild oozing from the incision site.  Possible bruising that could last 2 weeks.    Activity:  You may resume driving 24 hours following the procedure.  You may resume normal activity in 5 days or after the wound heals.  Avoid lifting more than 10 pounds for 5 days or until the wound heals.  Avoid strenuous exercise or activity for 1 week.    Nutrition:  Regular diet.  Drink at least 8 to 10 glasses of decaffeinated, non-alcoholic fluid for the next 24 hours to flush the x-ray dye used for your angiogram out of your body.    Call your doctor immediately if your condition worsens, for any other concerns, for a follow-up appointment or if you experience any of the following:  Significant bleeding that does not stop after 10 minutes of applying firm pressure on the puncture site.  Increased swelling on the groin or leg.  Unusual pain, numbness, or tingling of the groin or down the leg.  Any signs of infection such as: redness, yellow drainage at the site, swelling or pain.    Other Instructions:  Hold Metformin or Metformin containing drugs for 48 hours after procedure.

## 2025-06-13 NOTE — PROGRESS NOTES
Speech Language Pathology  Attempt    Deidra Dumont  1945    SLP attempted to see the pt for dysphagia treatment follow up. Pt's chart reviewed and consult completed with pt's RN, RN stated pt may have a procedure later this date, waiting on drs to round to determine. Pt currently NPO. Will hold dysphagia treatment and re-attempt as schedule allows and pt appropriate.     Yani An MS, CCC-SLP, CBIS  Speech-Language Pathologist

## 2025-06-13 NOTE — PROGRESS NOTES
Safety Interventions: patient left in chair, call light within reach, gait belt, nurse notified, and patient up ad claudia     Plan  Frequency: Eval with same day discharge.  No follow up required.  Current Treatment Recommendations: Not applicable, evaluation completed with same day discharge.    Goals  Patient eval with same day discharge.  No goals set as patient is at baseline mobility status.      Therapy Session Time      Individual Group Co-treatment   Time In     1058   Time Out     1131   Minutes     33     Timed Code Treatment Minutes: 18 Minutes  Total Treatment Minutes: 33 Minutes        Electronically Signed By: Carolyn Ramos, PT  Carolyn Ramos PT, DPT [id number]

## 2025-06-13 NOTE — PROGRESS NOTES
discharge  Prognosis: good without need for therapy intervention  Clinical Assessment: Patient presenting at independent level for completion of required self care tasks for return to home.  Eval with d/c at this time.  No therapy services indicated.   Safety Interventions: patient left in chair, call light within reach, nurse notified, and patient up ad claudia     Plan  Frequency: Eval with same day discharge.  No follow up required.  Current Treatment Recommendations: Not applicable, evaluation completed with same day discharge.    Goals  Patient eval with same day discharge.  No goals set as patient is at baseline self care status.      Therapy Session Time     Individual Group Co-treatment   Time In    1058   Time Out    1131   Minutes    33        Timed Code Treatment Minutes:  Timed Code Treatment Minutes: 18 Minutes    Total Treatment Minutes:  33       Electronically Signed By: MICHAEL Yarbrough OTR/L, CNS ([id number]) 6/13/2025 11:45 AM

## 2025-06-13 NOTE — PROGRESS NOTES
[facility] Note    Patient Active Problem List   Diagnosis    Coronary artery disease due to lipid rich plaque    Palpitations    Chest pain    Mixed hyperlipidemia    SOB (shortness of breath)    Dizziness    Post PTCA    Hypertensive urgency    Insomnia    Encounter for electronic analysis of reveal event recorder    H/O carotid stenosis    Inappropriate sinus tachycardia    Obesity    Acute inferior myocardial infarction (HCC)    H/O percutaneous transluminal coronary angioplasty    Hypotension    Pericarditis    Essential hypertension    Recent myocardial infarction    Uncontrolled hypertension    GI bleed    Antiplatelet or antithrombotic long-term use    Tachycardia    Gastrointestinal hemorrhage    Syncope and collapse    Hx of myocardial infarction    CVA (cerebral vascular accident) (HCC)    Facial droop    Hemiparesis (HCC)    Coronary artery disease of native artery of native heart with stable angina pectoris    Stenosis of right carotid artery    Dyslipidemia    New onset seizure (HCC)    Status post insertion of drug-eluting stent into right coronary artery for coronary artery disease    Bilateral leg edema    Nonrheumatic aortic valve insufficiency    Carotid atherosclerosis    Pain due to cardiac device    Chest discomfort    Carotid stenosis, bilateral    Carotid stenosis, right    Hematoma of neck    Hematoma    Class 1 obesity    Elevated brain natriuretic peptide (BNP) level    Esophagitis    Acute kidney injury superimposed on stage 3b chronic kidney disease (HCC)    Elevated alkaline phosphatase level       Past Medical History:   has a past medical history of Arthritis, Awareness under anesthesia, Blood transfusion, CAD (coronary artery disease), Cardiomyopathy (HCC), Coronary stent, CVA (cerebral vascular accident) (HCC), Febrile seizure (HCC), Gastroesophageal reflux disease, History of blood transfusion, Hyperlipidemia, Hypertension, and Septic shock

## 2025-06-13 NOTE — PROGRESS NOTES
CLINICAL PHARMACY NOTE: MEDS TO BEDS    Total # of Prescriptions Filled: 5   The following medications were delivered to the patient:  Metoprolol Tartrate 100 mg   Amlodipine Besylate 5 mg  Protonix 40 mg  Dicyclomine Hcl 10 mg   Isosorbide Mononitrate ER 30    Additional Documentation: [facility] approved to deliver medication to patient room=signed  Deidra Gaspar CPhT

## 2025-06-20 ENCOUNTER — OFFICE VISIT (OUTPATIENT)
Dept: CARDIOLOGY CLINIC | Age: 80
End: 2025-06-20
Payer: MEDICARE

## 2025-06-20 ENCOUNTER — APPOINTMENT (OUTPATIENT)
Dept: GENERAL RADIOLOGY | Age: 80
DRG: 315 | End: 2025-06-20
Payer: MEDICARE

## 2025-06-20 ENCOUNTER — APPOINTMENT (OUTPATIENT)
Dept: CT IMAGING | Age: 80
DRG: 315 | End: 2025-06-20
Payer: MEDICARE

## 2025-06-20 ENCOUNTER — HOSPITAL ENCOUNTER (INPATIENT)
Age: 80
LOS: 2 days | Discharge: HOME | DRG: 315 | End: 2025-06-22
Attending: EMERGENCY MEDICINE | Admitting: HOSPITALIST
Payer: MEDICARE

## 2025-06-20 VITALS
HEIGHT: 61 IN | BODY MASS INDEX: 30.21 KG/M2 | OXYGEN SATURATION: 99 % | SYSTOLIC BLOOD PRESSURE: 90 MMHG | DIASTOLIC BLOOD PRESSURE: 60 MMHG | HEART RATE: 76 BPM | WEIGHT: 160 LBS

## 2025-06-20 DIAGNOSIS — R06.02 SOB (SHORTNESS OF BREATH): ICD-10-CM

## 2025-06-20 DIAGNOSIS — E78.2 MIXED HYPERLIPIDEMIA: ICD-10-CM

## 2025-06-20 DIAGNOSIS — N17.9 ACUTE KIDNEY INJURY: ICD-10-CM

## 2025-06-20 DIAGNOSIS — I25.10 CORONARY ARTERY DISEASE DUE TO LIPID RICH PLAQUE: Primary | ICD-10-CM

## 2025-06-20 DIAGNOSIS — I25.83 CORONARY ARTERY DISEASE DUE TO LIPID RICH PLAQUE: Primary | ICD-10-CM

## 2025-06-20 DIAGNOSIS — I10 PRIMARY HYPERTENSION: ICD-10-CM

## 2025-06-20 DIAGNOSIS — R11.2 NAUSEA AND VOMITING, UNSPECIFIED VOMITING TYPE: Primary | ICD-10-CM

## 2025-06-20 LAB
ALBUMIN SERPL-MCNC: 4.6 G/DL (ref 3.4–5)
ALBUMIN/GLOB SERPL: 1.3 {RATIO} (ref 1.1–2.2)
ALP SERPL-CCNC: 183 U/L (ref 40–129)
ALT SERPL-CCNC: 26 U/L (ref 10–40)
ANION GAP SERPL CALCULATED.3IONS-SCNC: 18 MMOL/L (ref 3–16)
AST SERPL-CCNC: 40 U/L (ref 15–37)
BACTERIA URNS QL MICRO: ABNORMAL /HPF
BASOPHILS # BLD: 0.1 K/UL (ref 0–0.2)
BASOPHILS NFR BLD: 0.8 %
BILIRUB SERPL-MCNC: 0.3 MG/DL (ref 0–1)
BILIRUB UR QL STRIP.AUTO: NEGATIVE
BUN SERPL-MCNC: 26 MG/DL (ref 7–20)
CALCIUM SERPL-MCNC: 10.8 MG/DL (ref 8.3–10.6)
CHARACTER UR: ABNORMAL
CHLORIDE SERPL-SCNC: 101 MMOL/L (ref 99–110)
CLARITY UR: ABNORMAL
CO2 SERPL-SCNC: 19 MMOL/L (ref 21–32)
COLOR UR: YELLOW
CREAT SERPL-MCNC: 1.8 MG/DL (ref 0.6–1.2)
DEPRECATED RDW RBC AUTO: 15 % (ref 12.4–15.4)
EOSINOPHIL # BLD: 0.6 K/UL (ref 0–0.6)
EOSINOPHIL NFR BLD: 3.5 %
EPI CELLS #/AREA URNS HPF: ABNORMAL /HPF (ref 0–5)
GFR SERPLBLD CREATININE-BSD FMLA CKD-EPI: 28 ML/MIN/{1.73_M2}
GLUCOSE SERPL-MCNC: 126 MG/DL (ref 70–99)
GLUCOSE UR STRIP.AUTO-MCNC: NEGATIVE MG/DL
HCT VFR BLD AUTO: 37.3 % (ref 36–48)
HGB BLD-MCNC: 12.3 G/DL (ref 12–16)
HGB UR QL STRIP.AUTO: ABNORMAL
KETONES UR STRIP.AUTO-MCNC: NEGATIVE MG/DL
LEUKOCYTE ESTERASE UR QL STRIP.AUTO: ABNORMAL
LIPASE SERPL-CCNC: 44 U/L (ref 13–60)
LYMPHOCYTES # BLD: 3.2 K/UL (ref 1–5.1)
LYMPHOCYTES NFR BLD: 20.1 %
MAGNESIUM SERPL-MCNC: 2.06 MG/DL (ref 1.8–2.4)
MCH RBC QN AUTO: 30.8 PG (ref 26–34)
MCHC RBC AUTO-ENTMCNC: 33.1 G/DL (ref 31–36)
MCV RBC AUTO: 93.3 FL (ref 80–100)
MONOCYTES # BLD: 1.3 K/UL (ref 0–1.3)
MONOCYTES NFR BLD: 8.2 %
NEUTROPHILS # BLD: 10.6 K/UL (ref 1.7–7.7)
NEUTROPHILS NFR BLD: 67.4 %
NITRITE UR QL STRIP.AUTO: NEGATIVE
NT-PROBNP SERPL-MCNC: 1673 PG/ML (ref 0–449)
PH UR STRIP.AUTO: 5.5 [PH] (ref 5–8)
PLATELET # BLD AUTO: 400 K/UL (ref 135–450)
PMV BLD AUTO: 8.2 FL (ref 5–10.5)
POTASSIUM SERPL-SCNC: 4.8 MMOL/L (ref 3.5–5.1)
PROCALCITONIN SERPL IA-MCNC: 0.17 NG/ML (ref 0–0.15)
PROT SERPL-MCNC: 8.1 G/DL (ref 6.4–8.2)
PROT UR STRIP.AUTO-MCNC: >=300 MG/DL
RBC # BLD AUTO: 4 M/UL (ref 4–5.2)
RBC #/AREA URNS HPF: ABNORMAL /HPF (ref 0–4)
SODIUM SERPL-SCNC: 138 MMOL/L (ref 136–145)
SP GR UR STRIP.AUTO: 1.02 (ref 1–1.03)
TROPONIN, HIGH SENSITIVITY: 20 NG/L (ref 0–14)
TROPONIN, HIGH SENSITIVITY: 24 NG/L (ref 0–14)
UA COMPLETE W REFLEX CULTURE PNL UR: YES
UA DIPSTICK W REFLEX MICRO PNL UR: YES
URN SPEC COLLECT METH UR: ABNORMAL
UROBILINOGEN UR STRIP-ACNC: 0.2 E.U./DL
WBC # BLD AUTO: 15.7 K/UL (ref 4–11)
WBC #/AREA URNS HPF: ABNORMAL /HPF (ref 0–5)

## 2025-06-20 PROCEDURE — 87086 URINE CULTURE/COLONY COUNT: CPT

## 2025-06-20 PROCEDURE — 3078F DIAST BP <80 MM HG: CPT | Performed by: NURSE PRACTITIONER

## 2025-06-20 PROCEDURE — 99285 EMERGENCY DEPT VISIT HI MDM: CPT

## 2025-06-20 PROCEDURE — 36415 COLL VENOUS BLD VENIPUNCTURE: CPT

## 2025-06-20 PROCEDURE — 6360000002 HC RX W HCPCS: Performed by: NURSE PRACTITIONER

## 2025-06-20 PROCEDURE — 99214 OFFICE O/P EST MOD 30 MIN: CPT | Performed by: NURSE PRACTITIONER

## 2025-06-20 PROCEDURE — 6360000002 HC RX W HCPCS: Performed by: EMERGENCY MEDICINE

## 2025-06-20 PROCEDURE — 2500000003 HC RX 250 WO HCPCS: Performed by: NURSE PRACTITIONER

## 2025-06-20 PROCEDURE — 6370000000 HC RX 637 (ALT 250 FOR IP): Performed by: NURSE PRACTITIONER

## 2025-06-20 PROCEDURE — 80053 COMPREHEN METABOLIC PANEL: CPT

## 2025-06-20 PROCEDURE — 1090F PRES/ABSN URINE INCON ASSESS: CPT | Performed by: NURSE PRACTITIONER

## 2025-06-20 PROCEDURE — G0378 HOSPITAL OBSERVATION PER HR: HCPCS

## 2025-06-20 PROCEDURE — 96361 HYDRATE IV INFUSION ADD-ON: CPT

## 2025-06-20 PROCEDURE — G8417 CALC BMI ABV UP PARAM F/U: HCPCS | Performed by: NURSE PRACTITIONER

## 2025-06-20 PROCEDURE — 2500000003 HC RX 250 WO HCPCS: Performed by: EMERGENCY MEDICINE

## 2025-06-20 PROCEDURE — 96374 THER/PROPH/DIAG INJ IV PUSH: CPT

## 2025-06-20 PROCEDURE — 1159F MED LIST DOCD IN RCRD: CPT | Performed by: NURSE PRACTITIONER

## 2025-06-20 PROCEDURE — 84484 ASSAY OF TROPONIN QUANT: CPT

## 2025-06-20 PROCEDURE — 83735 ASSAY OF MAGNESIUM: CPT

## 2025-06-20 PROCEDURE — 84145 PROCALCITONIN (PCT): CPT

## 2025-06-20 PROCEDURE — 96375 TX/PRO/DX INJ NEW DRUG ADDON: CPT

## 2025-06-20 PROCEDURE — 1123F ACP DISCUSS/DSCN MKR DOCD: CPT | Performed by: NURSE PRACTITIONER

## 2025-06-20 PROCEDURE — 83880 ASSAY OF NATRIURETIC PEPTIDE: CPT

## 2025-06-20 PROCEDURE — 2580000003 HC RX 258: Performed by: PHYSICIAN ASSISTANT

## 2025-06-20 PROCEDURE — G8427 DOCREV CUR MEDS BY ELIG CLIN: HCPCS | Performed by: NURSE PRACTITIONER

## 2025-06-20 PROCEDURE — 85025 COMPLETE CBC W/AUTO DIFF WBC: CPT

## 2025-06-20 PROCEDURE — 2580000003 HC RX 258: Performed by: NURSE PRACTITIONER

## 2025-06-20 PROCEDURE — 81001 URINALYSIS AUTO W/SCOPE: CPT

## 2025-06-20 PROCEDURE — 83690 ASSAY OF LIPASE: CPT

## 2025-06-20 PROCEDURE — 71045 X-RAY EXAM CHEST 1 VIEW: CPT

## 2025-06-20 PROCEDURE — 2580000003 HC RX 258: Performed by: EMERGENCY MEDICINE

## 2025-06-20 PROCEDURE — 3074F SYST BP LT 130 MM HG: CPT | Performed by: NURSE PRACTITIONER

## 2025-06-20 PROCEDURE — 1200000000 HC SEMI PRIVATE

## 2025-06-20 PROCEDURE — 1111F DSCHRG MED/CURRENT MED MERGE: CPT | Performed by: NURSE PRACTITIONER

## 2025-06-20 PROCEDURE — G8400 PT W/DXA NO RESULTS DOC: HCPCS | Performed by: NURSE PRACTITIONER

## 2025-06-20 PROCEDURE — 1036F TOBACCO NON-USER: CPT | Performed by: NURSE PRACTITIONER

## 2025-06-20 PROCEDURE — 74176 CT ABD & PELVIS W/O CONTRAST: CPT

## 2025-06-20 PROCEDURE — 93005 ELECTROCARDIOGRAM TRACING: CPT | Performed by: PHYSICIAN ASSISTANT

## 2025-06-20 RX ORDER — SODIUM CHLORIDE 9 MG/ML
INJECTION, SOLUTION INTRAVENOUS CONTINUOUS
Status: ACTIVE | OUTPATIENT
Start: 2025-06-20 | End: 2025-06-21

## 2025-06-20 RX ORDER — METOPROLOL TARTRATE 50 MG
100 TABLET ORAL 2 TIMES DAILY
Status: DISCONTINUED | OUTPATIENT
Start: 2025-06-20 | End: 2025-06-22 | Stop reason: HOSPADM

## 2025-06-20 RX ORDER — METHOCARBAMOL 500 MG/1
500 TABLET, FILM COATED ORAL 3 TIMES DAILY
Status: DISCONTINUED | OUTPATIENT
Start: 2025-06-21 | End: 2025-06-21

## 2025-06-20 RX ORDER — AMLODIPINE BESYLATE 5 MG/1
5 TABLET ORAL DAILY
Status: DISCONTINUED | OUTPATIENT
Start: 2025-06-21 | End: 2025-06-22

## 2025-06-20 RX ORDER — ACETAMINOPHEN 325 MG/1
650 TABLET ORAL EVERY 6 HOURS PRN
Status: DISCONTINUED | OUTPATIENT
Start: 2025-06-20 | End: 2025-06-22 | Stop reason: HOSPADM

## 2025-06-20 RX ORDER — POLYETHYLENE GLYCOL 3350 17 G/17G
17 POWDER, FOR SOLUTION ORAL DAILY PRN
Status: DISCONTINUED | OUTPATIENT
Start: 2025-06-20 | End: 2025-06-22 | Stop reason: HOSPADM

## 2025-06-20 RX ORDER — 0.9 % SODIUM CHLORIDE 0.9 %
500 INTRAVENOUS SOLUTION INTRAVENOUS ONCE
Status: COMPLETED | OUTPATIENT
Start: 2025-06-20 | End: 2025-06-20

## 2025-06-20 RX ORDER — HEPARIN SODIUM 5000 [USP'U]/ML
5000 INJECTION, SOLUTION INTRAVENOUS; SUBCUTANEOUS EVERY 8 HOURS SCHEDULED
Status: DISCONTINUED | OUTPATIENT
Start: 2025-06-21 | End: 2025-06-22 | Stop reason: HOSPADM

## 2025-06-20 RX ORDER — SODIUM CHLORIDE 0.9 % (FLUSH) 0.9 %
5-40 SYRINGE (ML) INJECTION EVERY 12 HOURS SCHEDULED
Status: DISCONTINUED | OUTPATIENT
Start: 2025-06-20 | End: 2025-06-22 | Stop reason: HOSPADM

## 2025-06-20 RX ORDER — OXYCODONE HYDROCHLORIDE 5 MG/1
5 TABLET ORAL EVERY 4 HOURS PRN
Status: DISCONTINUED | OUTPATIENT
Start: 2025-06-20 | End: 2025-06-22 | Stop reason: HOSPADM

## 2025-06-20 RX ORDER — DICYCLOMINE HYDROCHLORIDE 10 MG/1
10 CAPSULE ORAL DAILY
Status: DISCONTINUED | OUTPATIENT
Start: 2025-06-21 | End: 2025-06-22 | Stop reason: HOSPADM

## 2025-06-20 RX ORDER — CLOPIDOGREL BISULFATE 75 MG/1
75 TABLET ORAL DAILY
Status: DISCONTINUED | OUTPATIENT
Start: 2025-06-21 | End: 2025-06-22 | Stop reason: HOSPADM

## 2025-06-20 RX ORDER — ONDANSETRON 2 MG/ML
4 INJECTION INTRAMUSCULAR; INTRAVENOUS EVERY 6 HOURS PRN
Status: DISCONTINUED | OUTPATIENT
Start: 2025-06-20 | End: 2025-06-22 | Stop reason: HOSPADM

## 2025-06-20 RX ORDER — ONDANSETRON 4 MG/1
4 TABLET, ORALLY DISINTEGRATING ORAL EVERY 8 HOURS PRN
Status: DISCONTINUED | OUTPATIENT
Start: 2025-06-20 | End: 2025-06-22 | Stop reason: HOSPADM

## 2025-06-20 RX ORDER — ASPIRIN 81 MG/1
81 TABLET ORAL DAILY
Status: DISCONTINUED | OUTPATIENT
Start: 2025-06-21 | End: 2025-06-22 | Stop reason: HOSPADM

## 2025-06-20 RX ORDER — ACETAMINOPHEN 650 MG/1
650 SUPPOSITORY RECTAL EVERY 6 HOURS PRN
Status: DISCONTINUED | OUTPATIENT
Start: 2025-06-20 | End: 2025-06-22 | Stop reason: HOSPADM

## 2025-06-20 RX ORDER — ONDANSETRON 2 MG/ML
4 INJECTION INTRAMUSCULAR; INTRAVENOUS ONCE
Status: COMPLETED | OUTPATIENT
Start: 2025-06-20 | End: 2025-06-20

## 2025-06-20 RX ORDER — FERROUS SULFATE 325(65) MG
325 TABLET, DELAYED RELEASE (ENTERIC COATED) ORAL 2 TIMES DAILY
Status: DISCONTINUED | OUTPATIENT
Start: 2025-06-20 | End: 2025-06-20

## 2025-06-20 RX ORDER — SODIUM CHLORIDE 9 MG/ML
INJECTION, SOLUTION INTRAVENOUS PRN
Status: DISCONTINUED | OUTPATIENT
Start: 2025-06-20 | End: 2025-06-22 | Stop reason: HOSPADM

## 2025-06-20 RX ORDER — ISOSORBIDE MONONITRATE 30 MG/1
15 TABLET, EXTENDED RELEASE ORAL DAILY
Status: DISCONTINUED | OUTPATIENT
Start: 2025-06-21 | End: 2025-06-22 | Stop reason: HOSPADM

## 2025-06-20 RX ORDER — IOPAMIDOL 755 MG/ML
75 INJECTION, SOLUTION INTRAVASCULAR
Status: DISCONTINUED | OUTPATIENT
Start: 2025-06-20 | End: 2025-06-20

## 2025-06-20 RX ORDER — ISOSORBIDE MONONITRATE 30 MG/1
15 TABLET, EXTENDED RELEASE ORAL DAILY
Qty: 45 TABLET | Refills: 1 | Status: SHIPPED | OUTPATIENT
Start: 2025-06-20

## 2025-06-20 RX ORDER — SODIUM CHLORIDE 0.9 % (FLUSH) 0.9 %
5-40 SYRINGE (ML) INJECTION PRN
Status: DISCONTINUED | OUTPATIENT
Start: 2025-06-20 | End: 2025-06-22 | Stop reason: HOSPADM

## 2025-06-20 RX ADMIN — SODIUM CHLORIDE 500 ML: 9 INJECTION, SOLUTION INTRAVENOUS at 17:42

## 2025-06-20 RX ADMIN — SODIUM CHLORIDE 500 ML: 0.9 INJECTION, SOLUTION INTRAVENOUS at 17:40

## 2025-06-20 RX ADMIN — METOPROLOL TARTRATE 100 MG: 50 TABLET, FILM COATED ORAL at 23:22

## 2025-06-20 RX ADMIN — SODIUM CHLORIDE: 0.9 INJECTION, SOLUTION INTRAVENOUS at 23:25

## 2025-06-20 RX ADMIN — SODIUM CHLORIDE, PRESERVATIVE FREE 10 ML: 5 INJECTION INTRAVENOUS at 23:28

## 2025-06-20 RX ADMIN — FAMOTIDINE 20 MG: 10 INJECTION, SOLUTION INTRAVENOUS at 17:41

## 2025-06-20 RX ADMIN — ONDANSETRON 4 MG: 2 INJECTION, SOLUTION INTRAMUSCULAR; INTRAVENOUS at 17:42

## 2025-06-20 RX ADMIN — WATER 1000 MG: 1 INJECTION INTRAMUSCULAR; INTRAVENOUS; SUBCUTANEOUS at 22:02

## 2025-06-20 ASSESSMENT — PAIN - FUNCTIONAL ASSESSMENT: PAIN_FUNCTIONAL_ASSESSMENT: NONE - DENIES PAIN

## 2025-06-20 NOTE — PROGRESS NOTES
hospital discharge.    Plan:  Stop amlodipine d/t low BP   Once metoprolol prescription runs out, resume betaxolol 10 mg daily   F/U with PCP for refills : robaxin / protonix     Addendum after left exam room : pt started vomiting at check out; with low BP and symptoms will have her proceed to ER    I have addressed the patient's cardiac risk factors and adjusted pharmacologic treatment as needed. In addition, I have reinforced the need for patient directed risk factor modification.    Further evaluation will be based upon the patient's clinical course and testing results.    All questions and concerns were addressed to the patient. Alternatives to  treatment were discussed.     The patient  currently  is not smoking.     Dual Antiplatelet therapy has been recommended / prescribed for this patient. Education conducted on adverse reactions including bleeding was discussed. The patient verbalizes understanding.    Pt is on a BB  Pt is on an ace-i/ARB  Pt is on a statin      Saturated fat diet discussed  Exercise program discussed    Thank you for allowing to us to participate in the care of Deidra Dumont.      [facility]  Documentation of today's visit sent to PCP

## 2025-06-20 NOTE — PATIENT INSTRUCTIONS
Hold amlodipine since your BP is low    Once you run out of metoprolol you can resume betaxolol 10 mg daily    Appt in four weeks

## 2025-06-20 NOTE — ED PROVIDER NOTES
In addition to the advanced practice provider, I personally saw Deidra Dumont and performed a substantive portion of the visit including all aspects of the medical decision making. I made/approved the management plan and take responsibility for the patient management    Briefly, this is a 79 y.o. female here for nausea and vomiting.  Patient was recently discharged from this facility after admission for nausea, vomiting and abdominal pain.  Also had chest pain and underwent coronary artery stenting.  Patient reports her chest pain is much improved, however she has had persistent nausea and vomiting.  She was seen at her cardiology clinic today for discharge follow-up where she was noted to be ill-appearing and hypotensive, she was thus sent to the emergency department for evaluation.    On exam, patient afebrile and nontoxic. No distress. Heart RRR. Lungs CTAB. Abdomen soft, nondistended, tender to palpation in epigastrium and right upper quadrant.  No significant lower abdominal tenderness.  No CVA tenderness.  No rebound, no rigidity, no guarding.      EKG  EKG was reviewed by emergency department physician in the absence of a cardiologist    Narrow complex sinus rhythm, rate 72, normal axis, normal SC and QRS intervals, normal Qtc, no ST elevations or depressions, biphasic T wave aVL, impression NSR with nonspecific T wave morphology, no STEMI      Screenings   Deshawn Coma Scale  Eye Opening: Spontaneous  Best Verbal Response: Oriented  Best Motor Response: Obeys commands  Greenback Coma Scale Score: 15      Is this patient to be included in the SEP-1 Core Measure due to severe sepsis or septic shock?   No     Exclusion criteria - the patient is NOT to be included for SEP-1 Core Measure due to:  Infection is not suspected      MDM    Patient afebrile, chronically ill-appearing however nontoxic.  She is in no acute painful or respiratory distress at time of my evaluation.  Reported to be hypotensive at

## 2025-06-20 NOTE — ED PROVIDER NOTES
[facility] [place] [facility]URSING  EMERGENCY DEPARTMENT ENCOUNTER        Pt Name: Deidra Dumont  MRN: [id number]  Birthdate 1945  Date of evaluation: 6/20/2025  Provider: Juan Yeh PA-C  PCP: Ilya Lomax MD  Note Started: 4:03 PM EDT 6/20/25       I have seen and evaluated this patient with my supervising physician GINGER QUEZADA       CHIEF COMPLAINT       Chief Complaint   Patient presents with    Hypotension     Pt was following up with cardiology today in office, had a low BP reading in the office, was having some dizziness and nausea when she stood up, sent to ED for evaluation.        HISTORY OF PRESENT ILLNESS: 1 or more Elements     History From: patient  Limitations to history : None    Deidra Dumont is a 79 y.o. female who presents to the emergency department with a chief complaint of dizziness and near syncope when she was in the cardiology office.  Recently was discharged and had stent placed.  She is post to follow-up with GI for possible ERCP.  States she was in the cardiologist office that she has been having some more issues with nausea and vomiting since she has been discharged and placed on multiple new medications.  She however had an episode of lightheadedness and became hypotensive with systolic blood pressure in the 90s and had a near syncopal episode.  She states she does have some shortness of breath on exertion but this been ongoing for at least 6 months and since before she had her stent.  She states her chest pain is gotten better since she had her stent.  She has chronic right upper quadrant abdominal pain this been ongoing for least 10 years.  Denies unilateral leg swelling or tenderness or any leg swelling, nausea, vomiting, diarrhea, bloody stool.  Has chronic dark stool on iron supplements.  Drove here to the emergency department.  At the time I see her she states she is feeling better.    Nursing Notes were all reviewed and agreed with or  Discharge Medication List        CONTINUE these medications which have NOT CHANGED    Details   metoprolol tartrate (LOPRESSOR) 100 MG tablet Take 1 tablet by mouth 2 times daily  Qty: 60 tablet, Refills: 1      amLODIPine (NORVASC) 5 MG tablet Take 1 tablet by mouth daily  Qty: 30 tablet, Refills: 2      pantoprazole (PROTONIX) 40 MG tablet Take 1 tablet by mouth every morning (before breakfast)  Qty: 30 tablet, Refills: 1      dicyclomine (BENTYL) 10 MG capsule Take 1 capsule by mouth daily  Qty: 30 capsule, Refills: 1      clopidogrel (PLAVIX) 75 MG tablet Take 1 tablet by mouth daily      vitamin B-6 (PYRIDOXINE) 50 MG tablet Take 1 tablet by mouth daily      ferrous sulfate (FE TABS 325) 325 (65 Fe) MG EC tablet Take 1 tablet by mouth in the morning and at bedtime  Qty: 90 tablet, Refills: 3      Omega-3 Fatty Acids (FISH OIL PO) Take by mouth daily      methocarbamol (ROBAXIN) 500 MG tablet Take 1 tablet by mouth 3 times daily      HYDROcodone-acetaminophen (NORCO)  MG per tablet Take 1 tablet by mouth every 6 hours as needed for Pain.      nitroGLYCERIN (NITROSTAT) 0.4 MG SL tablet DISSOLVE 1 TAB UNDER TONGUE FOR CHEST PAIN - IF PAIN REMAINS AFTER 5 MIN, CALL 911 AND REPEAT DOSE. MAX 3 TABS IN 15 MINUTES  Qty: 25 tablet, Refills: 2      VENLAFAXINE HCL PO Take 75 mg by mouth 2 times daily      rosuvastatin (CRESTOR) 40 MG tablet TAKE ONE TABLET BY MOUTH EVERY EVENING  Qty: 30 tablet, Refills: 9      aspirin 81 MG EC tablet Take 1 tablet by mouth every other day In am      Multiple Vitamins-Minerals (THERAPEUTIC MULTIVITAMIN-MINERALS) tablet Take 1 tablet by mouth daily      isosorbide mononitrate (IMDUR) 30 MG extended release tablet Take 0.5 tablets by mouth daily  Qty: 45 tablet, Refills: 1      Elastic Bandages & Supports (MEDICAL COMPRESSION STOCKINGS) MISC 1 each by Does not apply route daily as needed (swelling) Knee high 15-20 mmHg  Qty: 1 each, Refills: 0             ALLERGIES     Codeine,

## 2025-06-21 LAB
ANION GAP SERPL CALCULATED.3IONS-SCNC: 14 MMOL/L (ref 3–16)
BACTERIA UR CULT: NORMAL
BASOPHILS # BLD: 0.1 K/UL (ref 0–0.2)
BASOPHILS NFR BLD: 1.2 %
BUN SERPL-MCNC: 25 MG/DL (ref 7–20)
CALCIUM SERPL-MCNC: 9.4 MG/DL (ref 8.3–10.6)
CHLORIDE SERPL-SCNC: 105 MMOL/L (ref 99–110)
CO2 SERPL-SCNC: 20 MMOL/L (ref 21–32)
CREAT SERPL-MCNC: 1.5 MG/DL (ref 0.6–1.2)
DEPRECATED RDW RBC AUTO: 15.3 % (ref 12.4–15.4)
EKG ATRIAL RATE: 72 BPM
EKG DIAGNOSIS: NORMAL
EKG P AXIS: 64 DEGREES
EKG P-R INTERVAL: 178 MS
EKG Q-T INTERVAL: 408 MS
EKG QRS DURATION: 92 MS
EKG QTC CALCULATION (BAZETT): 446 MS
EKG R AXIS: -11 DEGREES
EKG T AXIS: 79 DEGREES
EKG VENTRICULAR RATE: 72 BPM
EOSINOPHIL # BLD: 0.6 K/UL (ref 0–0.6)
EOSINOPHIL NFR BLD: 6 %
GFR SERPLBLD CREATININE-BSD FMLA CKD-EPI: 35 ML/MIN/{1.73_M2}
GLUCOSE SERPL-MCNC: 104 MG/DL (ref 70–99)
HCT VFR BLD AUTO: 35.7 % (ref 36–48)
HGB BLD-MCNC: 11.6 G/DL (ref 12–16)
LYMPHOCYTES # BLD: 3.5 K/UL (ref 1–5.1)
LYMPHOCYTES NFR BLD: 38.2 %
MCH RBC QN AUTO: 30.8 PG (ref 26–34)
MCHC RBC AUTO-ENTMCNC: 32.6 G/DL (ref 31–36)
MCV RBC AUTO: 94.5 FL (ref 80–100)
MONOCYTES # BLD: 1 K/UL (ref 0–1.3)
MONOCYTES NFR BLD: 10.6 %
NEUTROPHILS # BLD: 4.1 K/UL (ref 1.7–7.7)
NEUTROPHILS NFR BLD: 44 %
PLATELET # BLD AUTO: 327 K/UL (ref 135–450)
PMV BLD AUTO: 8.1 FL (ref 5–10.5)
POTASSIUM SERPL-SCNC: 4.6 MMOL/L (ref 3.5–5.1)
RBC # BLD AUTO: 3.78 M/UL (ref 4–5.2)
SODIUM SERPL-SCNC: 139 MMOL/L (ref 136–145)
WBC # BLD AUTO: 9.2 K/UL (ref 4–11)

## 2025-06-21 PROCEDURE — 96376 TX/PRO/DX INJ SAME DRUG ADON: CPT

## 2025-06-21 PROCEDURE — 6370000000 HC RX 637 (ALT 250 FOR IP): Performed by: NURSE PRACTITIONER

## 2025-06-21 PROCEDURE — 85025 COMPLETE CBC W/AUTO DIFF WBC: CPT

## 2025-06-21 PROCEDURE — 96361 HYDRATE IV INFUSION ADD-ON: CPT

## 2025-06-21 PROCEDURE — 2580000003 HC RX 258: Performed by: NURSE PRACTITIONER

## 2025-06-21 PROCEDURE — 6360000002 HC RX W HCPCS: Performed by: NURSE PRACTITIONER

## 2025-06-21 PROCEDURE — G0378 HOSPITAL OBSERVATION PER HR: HCPCS

## 2025-06-21 PROCEDURE — 87086 URINE CULTURE/COLONY COUNT: CPT

## 2025-06-21 PROCEDURE — 93010 ELECTROCARDIOGRAM REPORT: CPT | Performed by: INTERNAL MEDICINE

## 2025-06-21 PROCEDURE — 1200000000 HC SEMI PRIVATE

## 2025-06-21 PROCEDURE — 96372 THER/PROPH/DIAG INJ SC/IM: CPT

## 2025-06-21 PROCEDURE — 80048 BASIC METABOLIC PNL TOTAL CA: CPT

## 2025-06-21 PROCEDURE — 2500000003 HC RX 250 WO HCPCS: Performed by: NURSE PRACTITIONER

## 2025-06-21 RX ADMIN — AMLODIPINE BESYLATE 5 MG: 5 TABLET ORAL at 11:00

## 2025-06-21 RX ADMIN — MELATONIN TAB 3 MG 3 MG: 3 TAB at 02:33

## 2025-06-21 RX ADMIN — METOPROLOL TARTRATE 100 MG: 50 TABLET, FILM COATED ORAL at 20:35

## 2025-06-21 RX ADMIN — METHOCARBAMOL 500 MG: 500 TABLET ORAL at 15:00

## 2025-06-21 RX ADMIN — WATER 1000 MG: 1 INJECTION INTRAMUSCULAR; INTRAVENOUS; SUBCUTANEOUS at 20:44

## 2025-06-21 RX ADMIN — OXYCODONE 5 MG: 5 TABLET ORAL at 22:27

## 2025-06-21 RX ADMIN — HEPARIN SODIUM 5000 UNITS: 5000 INJECTION INTRAVENOUS; SUBCUTANEOUS at 05:43

## 2025-06-21 RX ADMIN — SODIUM CHLORIDE, PRESERVATIVE FREE 10 ML: 5 INJECTION INTRAVENOUS at 11:03

## 2025-06-21 RX ADMIN — SODIUM CHLORIDE, PRESERVATIVE FREE 10 ML: 5 INJECTION INTRAVENOUS at 20:45

## 2025-06-21 RX ADMIN — SODIUM CHLORIDE: 0.9 INJECTION, SOLUTION INTRAVENOUS at 20:41

## 2025-06-21 RX ADMIN — ACETAMINOPHEN 650 MG: 325 TABLET ORAL at 20:35

## 2025-06-21 RX ADMIN — METHOCARBAMOL 500 MG: 500 TABLET ORAL at 11:02

## 2025-06-21 RX ADMIN — ASPIRIN 81 MG: 81 TABLET, DELAYED RELEASE ORAL at 11:02

## 2025-06-21 RX ADMIN — METOPROLOL TARTRATE 100 MG: 50 TABLET, FILM COATED ORAL at 11:00

## 2025-06-21 RX ADMIN — MELATONIN TAB 3 MG 3 MG: 3 TAB at 22:27

## 2025-06-21 RX ADMIN — ISOSORBIDE MONONITRATE 15 MG: 30 TABLET, EXTENDED RELEASE ORAL at 11:00

## 2025-06-21 RX ADMIN — DICYCLOMINE HYDROCHLORIDE 10 MG: 10 CAPSULE ORAL at 11:02

## 2025-06-21 RX ADMIN — CLOPIDOGREL BISULFATE 75 MG: 75 TABLET, FILM COATED ORAL at 11:00

## 2025-06-21 RX ADMIN — HEPARIN SODIUM 5000 UNITS: 5000 INJECTION INTRAVENOUS; SUBCUTANEOUS at 20:36

## 2025-06-21 RX ADMIN — OXYCODONE 5 MG: 5 TABLET ORAL at 02:33

## 2025-06-21 RX ADMIN — SODIUM CHLORIDE: 0.9 INJECTION, SOLUTION INTRAVENOUS at 11:08

## 2025-06-21 ASSESSMENT — PAIN DESCRIPTION - PAIN TYPE
TYPE: ACUTE PAIN

## 2025-06-21 ASSESSMENT — PAIN DESCRIPTION - DESCRIPTORS
DESCRIPTORS: ACHING

## 2025-06-21 ASSESSMENT — PAIN DESCRIPTION - LOCATION
LOCATION: ABDOMEN

## 2025-06-21 ASSESSMENT — PAIN DESCRIPTION - ORIENTATION
ORIENTATION: RIGHT;UPPER
ORIENTATION: MID
ORIENTATION: MID
ORIENTATION: RIGHT;MID
ORIENTATION: MID
ORIENTATION: MID
ORIENTATION: RIGHT;MID

## 2025-06-21 ASSESSMENT — PAIN - FUNCTIONAL ASSESSMENT
PAIN_FUNCTIONAL_ASSESSMENT: ACTIVITIES ARE NOT PREVENTED
PAIN_FUNCTIONAL_ASSESSMENT: ACTIVITIES ARE NOT PREVENTED

## 2025-06-21 ASSESSMENT — PAIN SCALES - WONG BAKER
WONGBAKER_NUMERICALRESPONSE: HURTS A LITTLE BIT
WONGBAKER_NUMERICALRESPONSE: NO HURT

## 2025-06-21 ASSESSMENT — PAIN SCALES - GENERAL
PAINLEVEL_OUTOF10: 5
PAINLEVEL_OUTOF10: 0
PAINLEVEL_OUTOF10: 5
PAINLEVEL_OUTOF10: 2
PAINLEVEL_OUTOF10: 6
PAINLEVEL_OUTOF10: 7
PAINLEVEL_OUTOF10: 5
PAINLEVEL_OUTOF10: 5
PAINLEVEL_OUTOF10: 0
PAINLEVEL_OUTOF10: 6

## 2025-06-21 ASSESSMENT — PAIN DESCRIPTION - ONSET: ONSET: ON-GOING

## 2025-06-21 ASSESSMENT — PAIN DESCRIPTION - FREQUENCY: FREQUENCY: CONTINUOUS

## 2025-06-21 NOTE — ED NOTES
Patient Name: Deidra Dumont  : 1945 79 y.o.  MRN: [id number]  ED Room #: ED-0010/10     Chief complaint:   Chief Complaint   Patient presents with    Hypotension     Pt was following up with cardiology today in office, had a low BP reading in the office, was having some dizziness and nausea when she stood up, sent to ED for evaluation.      Hospital Problem/Diagnosis:   Hospital Problems           Last Modified POA    * (Principal) Nausea and vomiting, unspecified vomiting type 2025 Yes         O2 Flow Rate:O2 Device: None (Room air)   (if applicable)  Cardiac Rhythm:   (if applicable)  Active LDA's:   Peripheral IV 25 Left;Proximal Forearm (Active)   Site Assessment Clean, dry & intact 25 6023            How does patient ambulate? Unknown, did not assess in the Emergency Department    2. How does patient take pills? Whole with Water    3. Is patient alert? Alert    4. Is patient oriented? To Person, To Place, To Time, and To Situation    5.   Patient arrived from:  home  Facility Name: ___________________________________________    6. If patient is disoriented or from a Skill Nursing Facility has family been notified of admission? No    7. Patient belongings? Belongings: Cell Phone and Clothing    Disposition of belongings? Kept with Patient     8. Any specific patient or family belongings/needs/dynamics?   a. N/a    9. Miscellaneous comments/pending orders?  a. N/a      If there are any additional questions please reach out to the Emergency Department.

## 2025-06-21 NOTE — PLAN OF CARE
4:44 PM  Additional Urinalysis sent per order. Shift assessment completed and charted. VSS. A/o x4. RN notified Dr. Hernández of patients blood pressure, medication changes made by MD. Patient denies dizziness but reports continued fatigue. Bed locked and in lowest position, call light within reach. SpO2 > 90% on room air. Denies N/V, reports tolerating a regular diet. Pt stable and denied needs when writer left room.    Problem: Chronic Conditions and Co-morbidities  Goal: Patient's chronic conditions and co-morbidity symptoms are monitored and maintained or improved  Outcome: Progressing     Problem: Discharge Planning  Goal: Discharge to home or other facility with appropriate resources  Outcome: Progressing     Problem: Safety - Adult  Goal: Free from fall injury  Outcome: Progressing     Problem: Pain  Goal: Verbalizes/displays adequate comfort level or baseline comfort level  Outcome: Progressing

## 2025-06-21 NOTE — H&P
V2.0  History and Physical      Name:  Deidra Dumont /Age/Sex: 1945  (79 y.o. female)   MRN & CSN:  [id number] & [id number] Encounter Date/Time: 2025 9:51 PM EDT   Location:  [facility] PCP: Ilya Lomax MD       Hospital Day: 1    Assessment and Plan:   Deidra Dumont is a 79 y.o. female with a pmh of chronic right sided abdominal pain, CAD with stents, hyperlipidemia, hypertension, history of MI, CVA.  She was referred by the cardiologist office to the ER for evaluation due to hypotension whilst at the cardiology appointment.  She is normotensive on presentation.  She reports poor oral intake.  She is found to have CHAPO on CKD.  She also has UTI.  Started on fluids and antibiotics.    Hospital Problems           Last Modified POA    * (Principal) Nausea and vomiting, unspecified vomiting type 2025 Yes       Plan:  # Hypotension.  Likely due to volume depletion.  Normotensive on presentation.  # CHAPO on CKD 3.  Creatinine of 1.8.  Baseline around 1.3.  # Dehydration-she reports poor oral intake.  - Admit with telemetry.  - Given bolus fluids in the ED.  Continue with maintenance fluids overnight and repeat labs in AM.  If worsens consider nephrology consultation.    # Urinary tract infection.  History of E. coli.  Rocephin pending cultures.    # CAD s/p stents.  Continue DAPT and statin    # Mildly elevated troponin.  Initial troponin of 24.  Repeat of 20.  She denies chest pain.  Abnormal EKG but without acute ischemia. Trend troponin.  Continue telemetry.     # Hypertension.  Currently borderline hypotensive.  Resume home meds when indicated.    # Chronic right-sided abdominal pain.  Stable per the patient  # Elevated alk phos-appears chronic  - She was recently admitted and evaluated by GI.  Plans for possible procedure whenever antiplatelets can be held.  She states that she is to follow-up with GI in 3 months.  Continue pain control.    Disposition:   Current Living  tablet Take 1 tablet by mouth every other day In am    ProviderGeraldine MD   Multiple Vitamins-Minerals (THERAPEUTIC MULTIVITAMIN-MINERALS) tablet Take 1 tablet by mouth daily    ProviderGeraldine MD       Physical Exam:    Physical Exam     General: NAD  Eyes: EOMI  ENT: neck supple  Cardiovascular: Regular rate and rhythm.  No JVD.  No peripheral edema.  S1-S2 present.  No chest wall tenderness.  Respiratory: Regular nonlabored respiration.  Even chest rise.  Bilateral lungs clear to auscultation.  No wheezing or crackles noted.  Gastrointestinal: Soft, tenderness on right side abdominal palpation.  Normoactive bowel sounds.  No noted ascites.   Genitourinary: no suprapubic tenderness  Musculoskeletal: No edema  Skin: warm, dry and without jaundice.  Neuro: Alert.  Intact neuroexam  Psych: Mood appropriate.       Past Medical History:   PMHx   Past Medical History:   Diagnosis Date    Arthritis     bilateral hands, bilateral knees, neck    Awareness under anesthesia     Blood transfusion     CAD (coronary artery disease)     Cardiomyopathy ([facility])     Coronary stent 08/27/2015    RCA stents on 3 occasions 2009, 2012, 2015    CVA (cerebral vascular accident) ([facility]) 01/19/2016    pt states determined she did not have a stroke    Febrile seizure ([facility]) 1953    Gastroesophageal reflux disease     History of blood transfusion     Hyperlipidemia     Hypertension     Nausea and vomiting, unspecified vomiting type 6/20/2025    Septic shock ([facility]) 2023    Dec 2023 to March     PSHX:  has a past surgical history that includes Hysterectomy; Cholecystectomy; Coronary angioplasty with stent (03/2009); Appendectomy; Hand surgery; Intracapsular cataract extraction (Bilateral, 12/31/2012); fracture surgery (01/2013); eye surgery (12/19/2012); other surgical history (01/17/2014); Carotid endarterectomy (Left, 07/08/2014); ablation of dysrhythmic focus (07/06/2015); Insertable Cardiac Monitor (10/15/2014); skin biopsy; Coronary

## 2025-06-21 NOTE — PROGRESS NOTES
Date of Admission: 6/20/2025. Hospital Day: 2       HOSPITAL COURSE  79 y.o. female with a pmh of chronic right sided abdominal pain, CAD with stents, hyperlipidemia, hypertension, history of MI, CVA     Interval  History:    Denies any complaint  except chronic ruq pain     Physical Exam     /67   Pulse 84   Temp 97.6 °F (36.4 °C) (Oral)   Resp 16   Ht 1.549 m (5' 1\")   Wt 72.6 kg (160 lb)   SpO2 95%   BMI 30.23 kg/m²     General appearance: No apparent distress, appears stated age and cooperative.  Respiratory:  Normal respiratory effort. Clear to auscultation, bilaterally without Rales/Wheezes/Rhonchi.  Cardiovascular: Regular rate and rhythm with normal S1/S2 without murmurs, rubs or gallops.  Abd : RUQ tenderness, chronic as per pt          Assessment/Plan:    #Hypotension.  Likely due to volume depletion and cardiac meds.  Normotensive on presentation.    # CHAPO on CKD 3.  Creatinine of 1.8.  Baseline around 1.3.  # Dehydration-she reports poor oral intake.     # Urinary tract infection.  History of E. coli.  Rocephin  ur cultures w cintamination, repeat      # CAD s/p stents.  Continue DAPT and statin     # Mildly elevated troponin.  Initial troponin of 24.  Repeat of 20.  She denies chest pain.  Abnormal EKG but without acute ischemia. Trend troponin.  Continue telemetry.      # Hypertension. Continue home meds     # Chronic right-sided abdominal pain.  Stable per the patient    # Elevated alk phos-appears chronic  - She was recently admitted and evaluated by GI.  Plans for possible procedure whenever antiplatelets can be held.  She states that she is to follow-up with GI in 3 months.  Continue pain control.          Active Hospital Problems    Diagnosis     Nausea and vomiting, unspecified vomiting type [R11.2]            DVT Prophylaxis:    Diet: ADULT DIET; Regular; 5 carb choices (75 gm/meal); Low Sodium (2 gm)  Code Status: Full Code      Dispo - home .  Expected DC  in/on   .  Barrier  to discharge            Chief Complaint:   Chief Complaint   Patient presents with    Hypotension     Pt was following up with cardiology today in office, had a low BP reading in the office, was having some dizziness and nausea when she stood up, sent to ED for evaluation.              Medications:  Reviewed    Infusion Medications    sodium chloride      sodium chloride 100 mL/hr at 06/21/25 1108     Scheduled Medications    amLODIPine  5 mg Oral Daily    aspirin  81 mg Oral Daily    clopidogrel  75 mg Oral Daily    dicyclomine  10 mg Oral Daily    isosorbide mononitrate  15 mg Oral Daily    methocarbamol  500 mg Oral TID    metoprolol  100 mg Oral BID    sodium chloride flush  5-40 mL IntraVENous 2 times per day    heparin (porcine)  5,000 Units SubCUTAneous 3 times per day    cefTRIAXone (ROCEPHIN) IV  1,000 mg IntraVENous Q24H     PRN Meds: sodium chloride flush, sodium chloride, ondansetron **OR** ondansetron, polyethylene glycol, acetaminophen **OR** acetaminophen, melatonin, oxyCODONE      Intake/Output Summary (Last 24 hours) at 6/21/2025 1618  Last data filed at 6/21/2025 1103  Gross per 24 hour   Intake 240 ml   Output --   Net 240 ml                 Labs:   Recent Labs     06/20/25  1557 06/21/25  0610   WBC 15.7* 9.2   HGB 12.3 11.6*   HCT 37.3 35.7*    327     Recent Labs     06/20/25  1557 06/21/25  0610    139   K 4.8 4.6    105   CO2 19* 20*   BUN 26* 25*   CREATININE 1.8* 1.5*   CALCIUM 10.8* 9.4     Recent Labs     06/20/25  1557   AST 40*   ALT 26   BILITOT 0.3   ALKPHOS 183*     No results for input(s): \"INR\" in the last 72 hours.  Recent Labs     06/20/25  1557 06/20/25  1723   TROPHS 24* 20*       Urinalysis:      Lab Results   Component Value Date/Time    NITRU Negative 06/20/2025 03:56 PM    WBCUA 10-20 06/20/2025 03:56 PM    BACTERIA 2+ 06/20/2025 03:56 PM    RBCUA 0-2 06/20/2025 03:56 PM    BLOODU TRACE-INTACT 06/20/2025 03:56 PM    GLUCOSEU Negative 06/20/2025 03:56 PM

## 2025-06-22 VITALS
HEIGHT: 61 IN | DIASTOLIC BLOOD PRESSURE: 83 MMHG | OXYGEN SATURATION: 100 % | WEIGHT: 160 LBS | SYSTOLIC BLOOD PRESSURE: 126 MMHG | HEART RATE: 79 BPM | TEMPERATURE: 98 F | BODY MASS INDEX: 30.21 KG/M2 | RESPIRATION RATE: 16 BRPM

## 2025-06-22 LAB — BACTERIA UR CULT: NORMAL

## 2025-06-22 PROCEDURE — 6370000000 HC RX 637 (ALT 250 FOR IP): Performed by: NURSE PRACTITIONER

## 2025-06-22 PROCEDURE — 96372 THER/PROPH/DIAG INJ SC/IM: CPT

## 2025-06-22 PROCEDURE — 2500000003 HC RX 250 WO HCPCS: Performed by: NURSE PRACTITIONER

## 2025-06-22 PROCEDURE — G0378 HOSPITAL OBSERVATION PER HR: HCPCS

## 2025-06-22 PROCEDURE — 6360000002 HC RX W HCPCS: Performed by: NURSE PRACTITIONER

## 2025-06-22 PROCEDURE — 6370000000 HC RX 637 (ALT 250 FOR IP): Performed by: STUDENT IN AN ORGANIZED HEALTH CARE EDUCATION/TRAINING PROGRAM

## 2025-06-22 RX ORDER — CIPROFLOXACIN 500 MG/1
500 TABLET, FILM COATED ORAL DAILY
Qty: 7 TABLET | Refills: 0 | Status: SHIPPED | OUTPATIENT
Start: 2025-06-22 | End: 2025-06-29

## 2025-06-22 RX ORDER — AMLODIPINE BESYLATE 2.5 MG/1
2.5 TABLET ORAL DAILY
Qty: 30 TABLET | Refills: 0 | Status: SHIPPED | OUTPATIENT
Start: 2025-06-22

## 2025-06-22 RX ORDER — AMLODIPINE BESYLATE 5 MG/1
2.5 TABLET ORAL DAILY
Status: DISCONTINUED | OUTPATIENT
Start: 2025-06-22 | End: 2025-06-22 | Stop reason: HOSPADM

## 2025-06-22 RX ORDER — VENLAFAXINE 37.5 MG/1
75 TABLET ORAL 2 TIMES DAILY WITH MEALS
Status: DISCONTINUED | OUTPATIENT
Start: 2025-06-22 | End: 2025-06-22 | Stop reason: HOSPADM

## 2025-06-22 RX ADMIN — METOPROLOL TARTRATE 100 MG: 50 TABLET, FILM COATED ORAL at 07:51

## 2025-06-22 RX ADMIN — OXYCODONE 5 MG: 5 TABLET ORAL at 07:51

## 2025-06-22 RX ADMIN — DICYCLOMINE HYDROCHLORIDE 10 MG: 10 CAPSULE ORAL at 07:51

## 2025-06-22 RX ADMIN — SODIUM CHLORIDE, PRESERVATIVE FREE 10 ML: 5 INJECTION INTRAVENOUS at 07:53

## 2025-06-22 RX ADMIN — VENLAFAXINE HYDROCHLORIDE 75 MG: 37.5 TABLET ORAL at 16:33

## 2025-06-22 RX ADMIN — HEPARIN SODIUM 5000 UNITS: 5000 INJECTION INTRAVENOUS; SUBCUTANEOUS at 13:40

## 2025-06-22 RX ADMIN — OXYCODONE 5 MG: 5 TABLET ORAL at 13:40

## 2025-06-22 RX ADMIN — CLOPIDOGREL BISULFATE 75 MG: 75 TABLET, FILM COATED ORAL at 07:49

## 2025-06-22 RX ADMIN — HEPARIN SODIUM 5000 UNITS: 5000 INJECTION INTRAVENOUS; SUBCUTANEOUS at 05:43

## 2025-06-22 RX ADMIN — ISOSORBIDE MONONITRATE 15 MG: 30 TABLET, EXTENDED RELEASE ORAL at 07:48

## 2025-06-22 RX ADMIN — OXYCODONE 5 MG: 5 TABLET ORAL at 16:40

## 2025-06-22 RX ADMIN — ASPIRIN 81 MG: 81 TABLET, DELAYED RELEASE ORAL at 07:50

## 2025-06-22 RX ADMIN — AMLODIPINE BESYLATE 2.5 MG: 5 TABLET ORAL at 12:37

## 2025-06-22 ASSESSMENT — PAIN SCALES - GENERAL
PAINLEVEL_OUTOF10: 4
PAINLEVEL_OUTOF10: 0
PAINLEVEL_OUTOF10: 0
PAINLEVEL_OUTOF10: 7
PAINLEVEL_OUTOF10: 7
PAINLEVEL_OUTOF10: 4

## 2025-06-22 ASSESSMENT — PAIN SCALES - WONG BAKER
WONGBAKER_NUMERICALRESPONSE: NO HURT
WONGBAKER_NUMERICALRESPONSE: NO HURT

## 2025-06-22 NOTE — PROGRESS NOTES
Discharge instructions given and all questions answered. PIV removed. Patient discharge home with  with all belongings.

## 2025-06-22 NOTE — PLAN OF CARE
Problem: Chronic Conditions and Co-morbidities  Goal: Patient's chronic conditions and co-morbidity symptoms are monitored and maintained or improved  6/21/2025 2216 by Jamie Araiza RN  Outcome: Progressing  6/21/2025 1630 by Farnaz Carlson RN  Outcome: Progressing     Problem: Discharge Planning  Goal: Discharge to home or other facility with appropriate resources  6/21/2025 2216 by Jamie Araiza RN  Outcome: Progressing  6/21/2025 1630 by Farnaz Carlson RN  Outcome: Progressing     Problem: Safety - Adult  Goal: Free from fall injury  6/21/2025 2216 by Jamie Araiza RN  Outcome: Progressing  6/21/2025 1630 by Farnaz Carlson RN  Outcome: Progressing     Problem: Pain  Goal: Verbalizes/displays adequate comfort level or baseline comfort level  6/21/2025 2216 by Jamie Araiza RN  Outcome: Progressing  6/21/2025 1630 by Farnaz Carlson RN  Outcome: Progressing

## 2025-06-22 NOTE — CARE COORDINATION
06/22/25 1233   Readmission Assessment   Number of Days since last admission? 8-30 days   Previous Disposition Home with Family   Who is being Interviewed Unable to Complete  (chart review)   What was the patient's/caregiver's perception as to why they think they needed to return back to the hospital? Other (Comment)  (Pt is admitted for N and V)   Did you visit your Primary Care Physician after you left the hospital, before you returned this time? No   Why weren't you able to visit your PCP? Did not have an appointment   Did you see a specialist, such as Cardiac, Pulmonary, Orthopedic Physician, etc. after you left the hospital? No   Who advised the patient to return to the hospital? Self-referral   Does the patient report anything that got in the way of taking their medications? No   In our efforts to provide the best possible care to you and others like you, can you think of anything that we could have done to help you after you left the hospital the first time, so that you might not have needed to return so soon? Other (Comment)  (Pt is admitted for N and V)     Electronically signed by Guy Coulter on 6/22/2025 at 12:35 PM

## 2025-06-22 NOTE — PLAN OF CARE
Problem: Chronic Conditions and Co-morbidities  Goal: Patient's chronic conditions and co-morbidity symptoms are monitored and maintained or improved  6/22/2025 0753 by Lidya Schofield RN  Outcome: Progressing     Problem: Discharge Planning  Goal: Discharge to home or other facility with appropriate resources  6/22/2025 0753 by Lidya Schofield RN  Outcome: Progressing     Problem: Safety - Adult  Goal: Free from fall injury  6/22/2025 0753 by Lidya Schofield RN  Outcome: Progressing     Problem: Pain  Goal: Verbalizes/displays adequate comfort level or baseline comfort level  6/22/2025 0753 by Lidya Schofield RN  Outcome: Progressing

## 2025-06-25 NOTE — DISCHARGE SUMMARY
Hospitalist Discharge Summary     Deidra Dumont  : 1945  MRN: [id number]    Admit date: 2025  Discharge date: 2025    Admitting Physician: Mikey Chatterjee MD    Discharge Diagnoses:   Patient Active Problem List   Diagnosis    Coronary artery disease due to lipid rich plaque    Palpitations    Chest pain    Mixed hyperlipidemia    SOB (shortness of breath)    Dizziness    Post PTCA    Hypertensive urgency    Insomnia    Encounter for electronic analysis of reveal event recorder    H/O carotid stenosis    Inappropriate sinus tachycardia    Obesity    Acute inferior myocardial infarction (HCC)    H/O percutaneous transluminal coronary angioplasty    Hypotension    Pericarditis    Essential hypertension    Recent myocardial infarction    Uncontrolled hypertension    GI bleed    Antiplatelet or antithrombotic long-term use    Tachycardia    Gastrointestinal hemorrhage    Syncope and collapse    Hx of myocardial infarction    CVA (cerebral vascular accident) (HCC)    Facial droop    Hemiparesis (HCC)    Coronary artery disease of native artery of native heart with stable angina pectoris    Stenosis of right carotid artery    Dyslipidemia    New onset seizure (HCC)    Status post insertion of drug-eluting stent into right coronary artery for coronary artery disease    Bilateral leg edema    Nonrheumatic aortic valve insufficiency    Carotid atherosclerosis    Pain due to cardiac device    Chest discomfort    Carotid stenosis, bilateral    Carotid stenosis, right    Hematoma of neck    Hematoma    Class 1 obesity    Elevated brain natriuretic peptide (BNP) level    Esophagitis    Acute kidney injury superimposed on stage 3b chronic kidney disease (HCC)    Elevated alkaline phosphatase level    Nausea and vomiting, unspecified vomiting type       Admission Condition: poor    Discharged Condition: fair    Discharge Exam:  VITALS:  /83   Pulse 79   Temp 98 °F (36.7 °C) (Oral)   Resp 16    Status   06/21/2025 327 135 - 450 K/uL Final   06/20/2025 400 135 - 450 K/uL Final   06/13/2025 248 135 - 450 K/uL Final     Sodium   Date Value Ref Range Status   06/21/2025 139 136 - 145 mmol/L Final   06/20/2025 138 136 - 145 mmol/L Final   06/13/2025 140 136 - 145 mmol/L Final     Potassium   Date Value Ref Range Status   06/13/2025 4.0 3.5 - 5.1 mmol/L Final   06/12/2025 4.0 3.5 - 5.1 mmol/L Final     Potassium reflex Magnesium   Date Value Ref Range Status   06/21/2025 4.6 3.5 - 5.1 mmol/L Final   06/20/2025 4.8 3.5 - 5.1 mmol/L Final     Comment:     Specimen hemolysis has exceeded the interference as defined by [employer].  Value may be falsely increased. Suggest recollection if clinically  indicated.     06/11/2025 4.5 3.5 - 5.1 mmol/L Final     Chloride   Date Value Ref Range Status   06/21/2025 105 99 - 110 mmol/L Final   06/20/2025 101 99 - 110 mmol/L Final   06/13/2025 108 99 - 110 mmol/L Final     CO2   Date Value Ref Range Status   06/21/2025 20 (L) 21 - 32 mmol/L Final   06/20/2025 19 (L) 21 - 32 mmol/L Final   06/13/2025 19 (L) 21 - 32 mmol/L Final     BUN   Date Value Ref Range Status   06/21/2025 25 (H) 7 - 20 mg/dL Final   06/20/2025 26 (H) 7 - 20 mg/dL Final   06/13/2025 15 7 - 20 mg/dL Final     Creatinine   Date Value Ref Range Status   06/21/2025 1.5 (H) 0.6 - 1.2 mg/dL Final   06/20/2025 1.8 (H) 0.6 - 1.2 mg/dL Final   06/13/2025 1.3 (H) 0.6 - 1.2 mg/dL Final     Calcium   Date Value Ref Range Status   06/21/2025 9.4 8.3 - 10.6 mg/dL Final   06/20/2025 10.8 (H) 8.3 - 10.6 mg/dL Final   06/13/2025 9.0 8.3 - 10.6 mg/dL Final     Phosphorus   Date Value Ref Range Status   06/13/2016 3.3 2.5 - 4.9 mg/dL Final   12/11/2015 3.8 2.5 - 4.9 mg/dL Final   10/30/2015 3.6 2.5 - 4.9 mg/dL Final     No results for input(s): \"AST\", \"ALT\", \"BILIDIR\", \"BILITOT\", \"ALKPHOS\" in the last 72 hours.  No results for input(s): \"INR\" in the last 72 hours.  No results for input(s): \"CKTOTAL\", \"TROPHS\" in the last 72  parenchymal lesion is noted. GI/Bowel: There is no bowel dilatation or bowel wall thickening.  The appendix is not identified and may be absent.  There is no enlarged appendix there are no inflammatory changes near the cecum.  There is a small hiatal hernia.  The stomach is otherwise unremarkable.  The amount of stool in the colon is believed to be normal. Pelvis: The bladder appears normal.  There has been a hysterectomy.  No free fluid or focal fluid collection is identified. Peritoneum/Retroperitoneum: No evidence of lymphadenopathy.  Aorta is normal in caliber.  No fat stranding, free fluid, free air or focal fluid collection is identified. Bones/Soft Tissues: No fracture destructive bone lesion is identified.     1. No acute findings in the abdomen or pelvis. 2. Small hiatal hernia.     XR CHEST PORTABLE  Result Date: 6/20/2025  EXAMINATION: ONE XRAY VIEW OF THE CHEST 6/20/2025 4:16 pm COMPARISON: CT chest June 6, 2025.  Chest x-ray same day HISTORY: ORDERING SYSTEM PROVIDED HISTORY: near syncope TECHNOLOGIST PROVIDED HISTORY: Reason for exam:->near syncope FINDINGS: Calcified hilar nodes and granuloma on the right.  Heart and mediastinum normal.  Lungs otherwise clear.  Bony thorax intact.     No acute cardiopulmonary process.  Old granulomatous disease.     MRI ABDOMEN WO CONTRAST  Result Date: 6/16/2025  EXAMINATION: MRCP 6/10/2025 6:15 pm TECHNIQUE: After initial T2 axial and coronal images, thick slab, thin slab and 3D coronal MRCP sequences were obtained without the administration of intravenous contrast.  MIP images are provided for review. COMPARISON: CT 06/06/2025. HISTORY: ORDERING SYSTEM PROVIDED HISTORY: RUQ pain TECHNOLOGIST PROVIDED HISTORY: Reason for exam:->RUQ pain What is the sedation requirement?->None Reason for Exam: right upper quadrant pain FINDINGS: Gallbladder: Status post cholecystectomy. Bile Ducts: Mild intra and extrahepatic biliary duct dilation.  The common bile duct measures up

## 2025-06-26 NOTE — PROGRESS NOTES
Informed pt that new US order was placed. Pt verbalized understanding. No other questions or concerns.     ----- Message from Anna RODRIGUEZ sent at 6/26/2025  3:51 PM CDT -----  Regarding: Requesting New order to be Placed  Patient called to schedule US Pelvis Non OB you ordered. According to our WIKI guides this order is incorrec. Due the patient age the correct code is [id number]. Please place new order with this IMG code and contact patient once new order is placed for patient to be able to schedule.     Thank you,   Pt refusing heparin injections. This RN educated pt on importance of taking medication. Pt still refused. Denies needs at this time. Call light within reach.

## 2025-06-28 NOTE — PROGRESS NOTES
Physician Progress Note      PATIENT:               MICHAEL AL  [facility] #:                  [id number]  :                       1945  ADMIT DATE:       2025 3:03 PM  DISCH DATE:        2025 4:50 PM  RESPONDING  PROVIDER #:        Joshua Hernández MD          QUERY TEXT:    Acute Kidney Injury is documented in the medical record (Internal Medicine H&P   ).  Please provide additional clinical indicators supportive of your   documentation. Or please document if the diagnosis of CHAPO has been ruled out   after study.    The clinical indicators include:  -\"CHAPO on CKD 3.  Creatinine of 1.8.  Baseline around 1.3\"...\"Given bolus   fluids in the ED.  Continue with maintenance fluids overnight and repeat labs   in AM\". (Internal Medicine H&P )  -\"Had mild worsening of CKD but  no evidence of CHAPO\". (Internal Medicine DS   )    Creatinine:  2025 15:57 - 1.8  2025 06:10 - 1.5  Options provided:  -- Acute kidney injury evidenced by, Please document evidence as well as a   numerical baseline creatinine, if known.  -- Acute kidney injury ruled out after study  -- Other - I will add my own diagnosis  -- Disagree - Not applicable / Not valid  -- Disagree - Clinically unable to determine / Unknown  -- Refer to Clinical Documentation Reviewer    PROVIDER RESPONSE TEXT:    Acute kidney injury was ruled out after study.    Query created by: Casio, Ulysses on 2025 6:38 PM      Electronically signed by:  Joshua Hernández MD 2025 5:15 PM

## (undated) DEVICE — CATH LAB PACK: Brand: MEDLINE INDUSTRIES, INC.

## (undated) DEVICE — CATHETER,URETHRAL,REDRUBBER,STERILE,20FR: Brand: MEDLINE

## (undated) DEVICE — PINNACLE INTRODUCER SHEATH: Brand: PINNACLE

## (undated) DEVICE — GAUZE,SPONGE,2"X2",8PLY,STERILE,LF,2'S: Brand: MEDLINE

## (undated) DEVICE — SUTURE NONABSORBABLE MONOFILAMENT 5-0 C-1 1X24 IN PROLENE 8725H

## (undated) DEVICE — CATHETER ANGIO INFIN 038IN AMPLATZ 534545T

## (undated) DEVICE — STANDARD HYPODERMIC NEEDLE,POLYPROPYLENE HUB: Brand: MONOJECT

## (undated) DEVICE — SURGIFOAM SPNG SZ 12-7

## (undated) DEVICE — PRESSURE GUIDEWIRE: Brand: COMET™ II

## (undated) DEVICE — SUTURE PERMA-HAND SZ 3-0 L18IN NONABSORBABLE BLK RB-1 L17MM C053D

## (undated) DEVICE — Device: Brand: NOMOLINE™ LH ADULT NASAL CO2 CANNULA WITH O2 4M

## (undated) DEVICE — RESERVOIR,SUCTION,100CC,SILICONE: Brand: MEDLINE

## (undated) DEVICE — GOWN SIRUS NONREIN XL W/TWL: Brand: MEDLINE INDUSTRIES, INC.

## (undated) DEVICE — CATH BLLN ANGIO 2X15MM SC EUPHORA RX

## (undated) DEVICE — DISH PETRI ST LF

## (undated) DEVICE — GLOVE SURG SZ 8 L11.77IN FNGR THK9.8MIL STRW LTX POLYMER

## (undated) DEVICE — DRAPE,ANGIO,BRACH,STERILE,38X44: Brand: MEDLINE

## (undated) DEVICE — FOGARTY - HYDRAGRIP SURGICAL - CLAMP INSERTS: Brand: FOGARTY SOFTJAW

## (undated) DEVICE — DRAIN SURG FLAT W7MMXL20CM FULL PERF

## (undated) DEVICE — DRAIN WND SIL FLAT RADIOPAQUE 10MM FULL FLUTED

## (undated) DEVICE — Device

## (undated) DEVICE — SOLUTION IRRIG 1000ML 0.9% SOD CHL USP POUR PLAS BTL

## (undated) DEVICE — SUTURE NONABSORBABLE MONOFILAMENT 6-0 C-1 1X30 IN PROLENE 8706H

## (undated) DEVICE — SUTURE NONABSORBABLE MONOFILAMENT 7-0 BV-1 1X24 IN PROLENE 8702H

## (undated) DEVICE — STAPLER EXT SKIN 35 WIDE S STL STPL SQUEEZE HNDL VISISTAT

## (undated) DEVICE — CATHETER ANGIO 5FR L100CM GRY S STL NYL JL3.5 3 SEG BRAID L

## (undated) DEVICE — INTRODUCER SHTH 0.018 IN 4 FRX70 CM 21 GAX7 CM KT MIC VSI

## (undated) DEVICE — SYRINGE MED 20ML STD CLR PLAS LUERLOCK TIP N CTRL DISP

## (undated) DEVICE — SUTURE PERMA-HAND SZ 2-0 L30IN NONABSORBABLE BLK L26MM SH K833H

## (undated) DEVICE — CATHETER DIAG L100CM DIA5.2FR 0.038IN POLYUR COR JR4 STD

## (undated) DEVICE — PAD, DEFIB, ADULT, RADIOTRANS, PHYSIO: Brand: MEDLINE

## (undated) DEVICE — SUTURE VICRYL + SZ 2-0 L27IN ABSRB CLR CT-1 1/2 CIR TAPERCUT VCP259H

## (undated) DEVICE — NEPTUNE E-SEP SMOKE EVACUATION PENCIL, COATED, 70MM BLADE, PUSH BUTTON SWITCH: Brand: NEPTUNE E-SEP

## (undated) DEVICE — COVER US PRB W12XL244CM SURGICAL INTRAOPERATIVE PLAS TAPR L

## (undated) DEVICE — KIT AT-X65 ANGIOTOUCH HAND CONTROLLER

## (undated) DEVICE — MAGNETIC INSTRUMENT PAD 10" X 16"; MEDIUM; DISPOSABLE: Brand: CARDINAL HEALTH

## (undated) DEVICE — CATH BLLN ANGIO 2.75X15MM NC EUPHORIA RX

## (undated) DEVICE — CATHETER GUIDE 6FR L100CM GRN PTFE XBLAD3.5 TRUELUMEN HYBRID

## (undated) DEVICE — 3M™ TEGADERM™ TRANSPARENT FILM DRESSING FRAME STYLE, 1624W, 2-3/8 IN X 2-3/4 IN (6 CM X 7 CM), 100/CT 4CT/CASE: Brand: 3M™ TEGADERM™

## (undated) DEVICE — Device: Brand: ASAHI SION BLUE

## (undated) DEVICE — GUIDEWIRE VASC L260CM DIA0.035IN RAD 3MM J TIP L7CM PTFE

## (undated) DEVICE — CORONARY IMAGING CATHETER: Brand: OPTICROSS™ 6 HD